# Patient Record
Sex: MALE | Race: WHITE | NOT HISPANIC OR LATINO | Employment: OTHER | URBAN - METROPOLITAN AREA
[De-identification: names, ages, dates, MRNs, and addresses within clinical notes are randomized per-mention and may not be internally consistent; named-entity substitution may affect disease eponyms.]

---

## 2017-02-24 ENCOUNTER — OFFICE VISIT - RIVER FALLS (OUTPATIENT)
Dept: FAMILY MEDICINE | Facility: CLINIC | Age: 57
End: 2017-02-24

## 2017-02-24 ASSESSMENT — MIFFLIN-ST. JEOR: SCORE: 1620.8

## 2017-03-02 ENCOUNTER — OFFICE VISIT - RIVER FALLS (OUTPATIENT)
Dept: FAMILY MEDICINE | Facility: CLINIC | Age: 57
End: 2017-03-02

## 2017-03-23 ENCOUNTER — OFFICE VISIT - RIVER FALLS (OUTPATIENT)
Dept: FAMILY MEDICINE | Facility: CLINIC | Age: 57
End: 2017-03-23

## 2017-04-11 ENCOUNTER — OFFICE VISIT - RIVER FALLS (OUTPATIENT)
Dept: FAMILY MEDICINE | Facility: CLINIC | Age: 57
End: 2017-04-11

## 2017-04-11 ASSESSMENT — MIFFLIN-ST. JEOR: SCORE: 1589.16

## 2017-07-25 ENCOUNTER — AMBULATORY - RIVER FALLS (OUTPATIENT)
Dept: FAMILY MEDICINE | Facility: CLINIC | Age: 57
End: 2017-07-25

## 2017-07-26 LAB
CHOLEST SERPL-MCNC: 210 MG/DL (ref 125–200)
CHOLEST/HDLC SERPL: 5.5 {RATIO}
HDLC SERPL-MCNC: 38 MG/DL
LDLC SERPL CALC-MCNC: 78 MG/DL
LDLC SERPL CALC-MCNC: ABNORMAL MG/DL
NONHDLC SERPL-MCNC: 172 MG/DL
TRIGL SERPL-MCNC: 653 MG/DL

## 2017-08-17 ENCOUNTER — OFFICE VISIT - RIVER FALLS (OUTPATIENT)
Dept: FAMILY MEDICINE | Facility: CLINIC | Age: 57
End: 2017-08-17

## 2017-08-17 ASSESSMENT — MIFFLIN-ST. JEOR: SCORE: 1600.05

## 2018-05-10 ENCOUNTER — AMBULATORY - RIVER FALLS (OUTPATIENT)
Dept: FAMILY MEDICINE | Facility: CLINIC | Age: 58
End: 2018-05-10

## 2018-05-11 LAB
CHOLEST SERPL-MCNC: 182 MG/DL
CHOLEST/HDLC SERPL: 4.7 {RATIO}
CREAT SERPL-MCNC: 1.03 MG/DL (ref 0.7–1.33)
GLUCOSE BLD-MCNC: 98 MG/DL (ref 65–99)
HDLC SERPL-MCNC: 39 MG/DL
LDLC SERPL CALC-MCNC: 94 MG/DL
NONHDLC SERPL-MCNC: 143 MG/DL
TRIGL SERPL-MCNC: 366 MG/DL

## 2018-05-23 ENCOUNTER — OFFICE VISIT - RIVER FALLS (OUTPATIENT)
Dept: FAMILY MEDICINE | Facility: CLINIC | Age: 58
End: 2018-05-23

## 2018-05-23 ASSESSMENT — MIFFLIN-ST. JEOR: SCORE: 1606.4

## 2018-10-24 ENCOUNTER — OFFICE VISIT - RIVER FALLS (OUTPATIENT)
Dept: FAMILY MEDICINE | Facility: CLINIC | Age: 58
End: 2018-10-24

## 2018-10-24 ASSESSMENT — MIFFLIN-ST. JEOR: SCORE: 1615.47

## 2019-01-14 ENCOUNTER — OFFICE VISIT - RIVER FALLS (OUTPATIENT)
Dept: FAMILY MEDICINE | Facility: CLINIC | Age: 59
End: 2019-01-14

## 2019-01-14 ASSESSMENT — MIFFLIN-ST. JEOR: SCORE: 1604.58

## 2019-01-15 LAB
BUN SERPL-MCNC: 12 MG/DL (ref 7–25)
BUN/CREAT RATIO - HISTORICAL: NORMAL (ref 6–22)
CALCIUM SERPL-MCNC: 10.3 MG/DL (ref 8.6–10.3)
CHLORIDE BLD-SCNC: 103 MMOL/L (ref 98–110)
CHOLEST SERPL-MCNC: 202 MG/DL
CHOLEST/HDLC SERPL: 5.6 {RATIO}
CO2 SERPL-SCNC: 30 MMOL/L (ref 20–32)
CREAT SERPL-MCNC: 0.97 MG/DL (ref 0.7–1.33)
EGFRCR SERPLBLD CKD-EPI 2021: 85 ML/MIN/1.73M2
GLUCOSE BLD-MCNC: 99 MG/DL (ref 65–99)
HDLC SERPL-MCNC: 36 MG/DL
LDLC SERPL CALC-MCNC: 87 MG/DL
LDLC SERPL CALC-MCNC: ABNORMAL MG/DL
NONHDLC SERPL-MCNC: 166 MG/DL
POTASSIUM BLD-SCNC: 5 MMOL/L (ref 3.5–5.3)
SODIUM SERPL-SCNC: 139 MMOL/L (ref 135–146)
TRIGL SERPL-MCNC: 443 MG/DL

## 2019-03-29 ENCOUNTER — OFFICE VISIT - RIVER FALLS (OUTPATIENT)
Dept: FAMILY MEDICINE | Facility: CLINIC | Age: 59
End: 2019-03-29

## 2019-03-29 ASSESSMENT — MIFFLIN-ST. JEOR: SCORE: 1602.77

## 2019-03-30 LAB
BUN SERPL-MCNC: 12 MG/DL (ref 7–25)
BUN/CREAT RATIO - HISTORICAL: NORMAL (ref 6–22)
CALCIUM SERPL-MCNC: 9.8 MG/DL (ref 8.6–10.3)
CHLORIDE BLD-SCNC: 101 MMOL/L (ref 98–110)
CO2 SERPL-SCNC: 29 MMOL/L (ref 20–32)
CREAT SERPL-MCNC: 0.96 MG/DL (ref 0.7–1.33)
EGFRCR SERPLBLD CKD-EPI 2021: 86 ML/MIN/1.73M2
ERYTHROCYTE [DISTWIDTH] IN BLOOD BY AUTOMATED COUNT: 14 % (ref 11–15)
ERYTHROCYTE [SEDIMENTATION RATE] IN BLOOD BY WESTERGREN METHOD: 6 MM/H
GLUCOSE BLD-MCNC: 90 MG/DL (ref 65–99)
HCT VFR BLD AUTO: 44.7 % (ref 38.5–50)
HGB BLD-MCNC: 15.8 GM/DL (ref 13.2–17.1)
MCH RBC QN AUTO: 29.4 PG (ref 27–33)
MCHC RBC AUTO-ENTMCNC: 35.3 GM/DL (ref 32–36)
MCV RBC AUTO: 83.1 FL (ref 80–100)
PLATELET # BLD AUTO: 265 10*3/UL (ref 140–400)
PMV BLD: 11.5 FL (ref 7.5–12.5)
POTASSIUM BLD-SCNC: 4.3 MMOL/L (ref 3.5–5.3)
RBC # BLD AUTO: 5.38 10*6/UL (ref 4.2–5.8)
SODIUM SERPL-SCNC: 138 MMOL/L (ref 135–146)
TSH SERPL DL<=0.005 MIU/L-ACNC: 1.91 MIU/L (ref 0.4–4.5)
WBC # BLD AUTO: 8.5 10*3/UL (ref 3.8–10.8)

## 2019-08-07 ENCOUNTER — COMMUNICATION - RIVER FALLS (OUTPATIENT)
Dept: FAMILY MEDICINE | Facility: CLINIC | Age: 59
End: 2019-08-07
Payer: COMMERCIAL

## 2019-09-25 ENCOUNTER — OFFICE VISIT - RIVER FALLS (OUTPATIENT)
Dept: FAMILY MEDICINE | Facility: CLINIC | Age: 59
End: 2019-09-25
Payer: COMMERCIAL

## 2019-09-25 LAB
FLUAV AG SPEC QL IA: NEGATIVE
FLUBV AG SPEC QL IA: NEGATIVE

## 2019-09-25 ASSESSMENT — MIFFLIN-ST. JEOR: SCORE: 1606.4

## 2019-09-29 ENCOUNTER — OFFICE VISIT - RIVER FALLS (OUTPATIENT)
Dept: FAMILY MEDICINE | Facility: CLINIC | Age: 59
End: 2019-09-29
Payer: COMMERCIAL

## 2019-09-29 ENCOUNTER — COMMUNICATION - RIVER FALLS (OUTPATIENT)
Dept: FAMILY MEDICINE | Facility: CLINIC | Age: 59
End: 2019-09-29
Payer: COMMERCIAL

## 2019-09-29 LAB
BASOPHILS # BLD MANUAL: 0 10*3/UL
BASOPHILS NFR BLD MANUAL: 0.4 %
D DIMER PPP FEU-MCNC: 0.54
EOSINOPHIL # BLD MANUAL: 0.3 10*3/UL
EOSINOPHIL NFR BLD MANUAL: 2.6 %
ERYTHROCYTE [DISTWIDTH] IN BLOOD BY AUTOMATED COUNT: 13.6 % (ref 11.5–15.5)
HCT VFR BLD AUTO: 42.5 % (ref 37–53)
HGB BLD-MCNC: 14.9 G/DL (ref 13.5–17.5)
LYMPHOCYTES # BLD MANUAL: 1.3 10*3/UL (ref 0.9–2.9)
LYMPHOCYTES NFR BLD MANUAL: 13.2 %
MCH RBC QN AUTO: 29.4 PG (ref 26–34)
MCHC RBC AUTO-ENTMCNC: 35.1 G/DL (ref 32–36)
MCV RBC AUTO: 84 FL (ref 80–100)
MONOCYTES # BLD MANUAL: 1.2 10*3/UL
MONOCYTES NFR BLD MANUAL: 12.2 %
NEUTROPHILS # BLD MANUAL: 7.3 10*3/UL (ref 1.7–7)
NEUTROPHILS NFR BLD MANUAL: 71.6 %
PLATELET # BLD AUTO: 276 10*3/UL (ref 140–440)
PMV BLD: 10.6 FL (ref 6.5–11)
RBC # BLD AUTO: 5.06 10*6/UL (ref 4.3–5.9)
WBC # BLD AUTO: 10.2 10*3/UL (ref 4.5–11)

## 2019-09-29 ASSESSMENT — MIFFLIN-ST. JEOR: SCORE: 1582.81

## 2019-10-15 ENCOUNTER — OFFICE VISIT - RIVER FALLS (OUTPATIENT)
Dept: FAMILY MEDICINE | Facility: CLINIC | Age: 59
End: 2019-10-15
Payer: COMMERCIAL

## 2019-10-15 ASSESSMENT — MIFFLIN-ST. JEOR: SCORE: 1588.25

## 2019-10-29 ENCOUNTER — OFFICE VISIT - RIVER FALLS (OUTPATIENT)
Dept: FAMILY MEDICINE | Facility: CLINIC | Age: 59
End: 2019-10-29
Payer: COMMERCIAL

## 2019-10-29 ASSESSMENT — MIFFLIN-ST. JEOR: SCORE: 1595.51

## 2019-11-26 ENCOUNTER — OFFICE VISIT - RIVER FALLS (OUTPATIENT)
Dept: FAMILY MEDICINE | Facility: CLINIC | Age: 59
End: 2019-11-26
Payer: COMMERCIAL

## 2019-11-26 ASSESSMENT — MIFFLIN-ST. JEOR: SCORE: 1615.47

## 2020-01-05 ENCOUNTER — OFFICE VISIT - RIVER FALLS (OUTPATIENT)
Dept: FAMILY MEDICINE | Facility: CLINIC | Age: 60
End: 2020-01-05
Payer: COMMERCIAL

## 2020-01-05 LAB
ANION GAP SERPL CALCULATED.3IONS-SCNC: 10 MMOL/L (ref 5–18)
BUN SERPL-MCNC: 15 MG/DL (ref 8–25)
BUN/CREAT RATIO - HISTORICAL: 13 (ref 10–20)
CALCIUM SERPL-MCNC: 9.9 MG/DL (ref 8.5–10.5)
CHLORIDE BLD-SCNC: 105 MMOL/L (ref 98–110)
CO2 SERPL-SCNC: 24 MMOL/L (ref 21–31)
CREAT SERPL-MCNC: 1.13 MG/DL (ref 0.72–1.25)
GFR ESTIMATE EXT - HISTORICAL: >60
GLUCOSE BLD-MCNC: 99 MG/DL (ref 65–100)
POTASSIUM BLD-SCNC: 4.5 MMOL/L (ref 3.5–5)
SODIUM SERPL-SCNC: 139 MMOL/L (ref 135–145)

## 2020-01-05 ASSESSMENT — MIFFLIN-ST. JEOR: SCORE: 1627.26

## 2020-01-07 ENCOUNTER — COMMUNICATION - RIVER FALLS (OUTPATIENT)
Dept: FAMILY MEDICINE | Facility: CLINIC | Age: 60
End: 2020-01-07
Payer: COMMERCIAL

## 2020-01-07 LAB — URATE SERPL-MCNC: 8 MG/DL (ref 4–8)

## 2020-03-13 ENCOUNTER — COMMUNICATION - RIVER FALLS (OUTPATIENT)
Dept: FAMILY MEDICINE | Facility: CLINIC | Age: 60
End: 2020-03-13
Payer: COMMERCIAL

## 2020-06-02 ENCOUNTER — AMBULATORY - RIVER FALLS (OUTPATIENT)
Dept: FAMILY MEDICINE | Facility: CLINIC | Age: 60
End: 2020-06-02
Payer: COMMERCIAL

## 2020-06-02 ASSESSMENT — MIFFLIN-ST. JEOR: SCORE: 1611.84

## 2020-06-03 ENCOUNTER — COMMUNICATION - RIVER FALLS (OUTPATIENT)
Dept: FAMILY MEDICINE | Facility: CLINIC | Age: 60
End: 2020-06-03
Payer: COMMERCIAL

## 2020-06-03 LAB
BUN SERPL-MCNC: 22 MG/DL (ref 7–25)
BUN/CREAT RATIO - HISTORICAL: ABNORMAL (ref 6–22)
CALCIUM SERPL-MCNC: 10.3 MG/DL (ref 8.6–10.3)
CHLORIDE BLD-SCNC: 104 MMOL/L (ref 98–110)
CHOLEST SERPL-MCNC: 315 MG/DL
CHOLEST/HDLC SERPL: 8.1 {RATIO}
CO2 SERPL-SCNC: 23 MMOL/L (ref 20–32)
CREAT SERPL-MCNC: 1.16 MG/DL (ref 0.7–1.25)
EGFRCR SERPLBLD CKD-EPI 2021: 68 ML/MIN/1.73M2
ERYTHROCYTE [DISTWIDTH] IN BLOOD BY AUTOMATED COUNT: 13.5 % (ref 11–15)
GLUCOSE BLD-MCNC: 104 MG/DL (ref 65–99)
HCT VFR BLD AUTO: 47.2 % (ref 38.5–50)
HDLC SERPL-MCNC: 39 MG/DL
HGB BLD-MCNC: 16 GM/DL (ref 13.2–17.1)
LDLC SERPL CALC-MCNC: ABNORMAL MG/DL
MCH RBC QN AUTO: 28.9 PG (ref 27–33)
MCHC RBC AUTO-ENTMCNC: 33.9 GM/DL (ref 32–36)
MCV RBC AUTO: 85.2 FL (ref 80–100)
NONHDLC SERPL-MCNC: 276 MG/DL
PLATELET # BLD AUTO: 280 10*3/UL (ref 140–400)
PMV BLD: 11.1 FL (ref 7.5–12.5)
POTASSIUM BLD-SCNC: 4.6 MMOL/L (ref 3.5–5.3)
RBC # BLD AUTO: 5.54 10*6/UL (ref 4.2–5.8)
SODIUM SERPL-SCNC: 139 MMOL/L (ref 135–146)
TRIGL SERPL-MCNC: 626 MG/DL
WBC # BLD AUTO: 7.1 10*3/UL (ref 3.8–10.8)

## 2020-06-09 ENCOUNTER — OFFICE VISIT - RIVER FALLS (OUTPATIENT)
Dept: FAMILY MEDICINE | Facility: CLINIC | Age: 60
End: 2020-06-09
Payer: COMMERCIAL

## 2020-08-18 ENCOUNTER — AMBULATORY - RIVER FALLS (OUTPATIENT)
Dept: FAMILY MEDICINE | Facility: CLINIC | Age: 60
End: 2020-08-18
Payer: COMMERCIAL

## 2020-08-19 ENCOUNTER — COMMUNICATION - RIVER FALLS (OUTPATIENT)
Dept: FAMILY MEDICINE | Facility: CLINIC | Age: 60
End: 2020-08-19
Payer: COMMERCIAL

## 2020-08-19 LAB
A/G RATIO - HISTORICAL: 2.2 (ref 1–2.5)
ALBUMIN SERPL-MCNC: 4.8 GM/DL (ref 3.6–5.1)
ALP SERPL-CCNC: 67 UNIT/L (ref 35–144)
ALT SERPL W P-5'-P-CCNC: 40 UNIT/L (ref 9–46)
AST SERPL W P-5'-P-CCNC: 28 UNIT/L (ref 10–35)
BILIRUB SERPL-MCNC: 0.7 MG/DL (ref 0.2–1.2)
BUN SERPL-MCNC: 19 MG/DL (ref 7–25)
BUN/CREAT RATIO - HISTORICAL: NORMAL (ref 6–22)
CALCIUM SERPL-MCNC: 10 MG/DL (ref 8.6–10.3)
CHLORIDE BLD-SCNC: 102 MMOL/L (ref 98–110)
CHOLEST SERPL-MCNC: 220 MG/DL
CHOLEST/HDLC SERPL: 5.5 {RATIO}
CO2 SERPL-SCNC: 28 MMOL/L (ref 20–32)
CREAT SERPL-MCNC: 1.1 MG/DL (ref 0.7–1.25)
EGFRCR SERPLBLD CKD-EPI 2021: 72 ML/MIN/1.73M2
GLOBULIN: 2.2 (ref 1.9–3.7)
GLUCOSE BLD-MCNC: 95 MG/DL (ref 65–99)
HDLC SERPL-MCNC: 40 MG/DL
LDLC SERPL CALC-MCNC: ABNORMAL MG/DL
NONHDLC SERPL-MCNC: 180 MG/DL
POTASSIUM BLD-SCNC: 4.7 MMOL/L (ref 3.5–5.3)
PROT SERPL-MCNC: 7 GM/DL (ref 6.1–8.1)
SODIUM SERPL-SCNC: 137 MMOL/L (ref 135–146)
TRIGL SERPL-MCNC: 433 MG/DL

## 2020-12-21 ENCOUNTER — OFFICE VISIT - RIVER FALLS (OUTPATIENT)
Dept: FAMILY MEDICINE | Facility: CLINIC | Age: 60
End: 2020-12-21
Payer: COMMERCIAL

## 2021-01-31 ENCOUNTER — OFFICE VISIT - RIVER FALLS (OUTPATIENT)
Dept: FAMILY MEDICINE | Facility: CLINIC | Age: 61
End: 2021-01-31
Payer: COMMERCIAL

## 2021-02-03 ENCOUNTER — COMMUNICATION - RIVER FALLS (OUTPATIENT)
Dept: FAMILY MEDICINE | Facility: CLINIC | Age: 61
End: 2021-02-03
Payer: COMMERCIAL

## 2021-02-03 LAB — SARS-COV-2 RNA RESP QL NAA+PROBE: NEGATIVE

## 2021-02-09 ENCOUNTER — OFFICE VISIT - RIVER FALLS (OUTPATIENT)
Dept: FAMILY MEDICINE | Facility: CLINIC | Age: 61
End: 2021-02-09
Payer: COMMERCIAL

## 2021-02-09 ENCOUNTER — OFFICE VISIT (OUTPATIENT)
Dept: PHARMACY | Facility: PHYSICIAN GROUP | Age: 61
End: 2021-02-09
Payer: COMMERCIAL

## 2021-02-09 DIAGNOSIS — J45.909 REACTIVE AIRWAY DISEASE: Primary | ICD-10-CM

## 2021-02-09 DIAGNOSIS — J45.909 REACTIVE AIRWAY DISEASE WITHOUT COMPLICATION, UNSPECIFIED ASTHMA SEVERITY, UNSPECIFIED WHETHER PERSISTENT: ICD-10-CM

## 2021-02-09 PROCEDURE — 99207 PR NO CHARGE LOS: CPT | Performed by: PHARMACIST

## 2021-02-09 RX ORDER — ALBUTEROL SULFATE 90 UG/1
2 AEROSOL, METERED RESPIRATORY (INHALATION) EVERY 4 HOURS PRN
COMMUNITY
End: 2023-01-25

## 2021-02-09 RX ORDER — OMEPRAZOLE 40 MG/1
40 CAPSULE, DELAYED RELEASE ORAL DAILY
COMMUNITY
End: 2022-03-31

## 2021-02-09 RX ORDER — INDOMETHACIN 50 MG/1
50 CAPSULE ORAL
COMMUNITY
End: 2023-01-25

## 2021-02-09 RX ORDER — ROSUVASTATIN CALCIUM 20 MG/1
20 TABLET, COATED ORAL DAILY
COMMUNITY
End: 2022-03-31

## 2021-02-09 RX ORDER — FLUTICASONE PROPIONATE AND SALMETEROL 113; 14 UG/1; UG/1
1 POWDER, METERED RESPIRATORY (INHALATION) 2 TIMES DAILY
COMMUNITY
End: 2022-10-25

## 2021-02-09 NOTE — PATIENT INSTRUCTIONS
Recommendations from today's MTM visit:                                                    MTM (medication therapy management) is a service provided by a clinical pharmacist designed to help you get the most of out of your medicines.      Option 1:   Airduo respiclick (113/14): 1 puff by mouth twice daily; rinse mouth after use - Can use coupon    Option 2:  Advair MDI (115/21): 2 puffs by mouth twice daily; rinse mouth after use - generic option available that may be cheaper.    If both are still too expensive, recommend calling your insurance to see which inhalers are tier 1 (least expensive).    It was great to speak with you today.  I value your experience and would be very thankful for your time with providing feedback on our clinic survey. You may receive a survey via email or text message in the next few days.     Next MTM visit: as needed - call to schedule.  [employer] OOS coverage unknown.    To schedule another MTM appointment, please call the clinic directly or you may call the MTM scheduling line at [phone] or toll-free at [phone].     My Clinical Pharmacist's contact information:                                                      It was a pleasure talking with you today!  Please feel free to contact me with any questions or concerns you have.      Rocio Rodriguez, Abdirizak  Medication Therapy Management (MTM) Pharmacist  [facility] (Tu/Th/Fr)  Clinic Phone: [phone]  Pager: [phone]

## 2021-02-09 NOTE — PROGRESS NOTES
Clinical Pharmacy Consult:                                                    Andrew Monique Jr. is a 60 year old male coming in for a clinical pharmacist consult.  He was referred to me from TAMANNA Guillory.     Reason for Consult: inhaler cost - per Patient  Per provider: assist Andrew in finding an assistance plan or different combination inhaled corticosteroid/LABA as Dulera (prescribed by Dr. Yu) which has worked well for him is costing $300+ per inhaler and so he had stopped using.     Discussion:   Reactive Airway disease:  Per Patient symptoms started about 5 years ago, then about 1 year ago started on Dulera inhaler - which helped.  States that symptoms are better controlled when taking the Dulera, but he had stopped it over the summer months due to cost. Then recently had a flare of symptoms - and that is when he scheduled with TAMANNA Guillory to be seen.    Symptoms: Intermittent coughing spells that then result in a pounding on the top of his head; thinks that at one point long ago he was coughing so bad he felt like he might have passed out, shortness of breath.  If he is climbing stairs or doing other activities will get out of breath, or breathing harder.    Current meds:     Dulera: $360/fill Dulera 100-5 mc puff twice daily, rinsing mouth after use; started about 1 year ago felt that this was working well while taking it consistently.    Albuterol is not as expensive - has had this for 4-5 years; needs multiple times/day in the winter months and using this intermittently through the summer months.   Finds relief for minutes to hours, sometimes has jitters from using  Current use: three times daily; uses more than three times daily when not on dulera.    Recommendation:  Increase steroid component of inhaler since still needing albuterol three times daily with current dulera (which is low steroid dose).  Will switch to product with generic option or coupon option.    Education  provided about tiered insurance coverage and if the medication options sent today are still too expensive, would encourage him to call insurance directly to determine the cheapest option then notify us at clinic.    Plan:  1. Will switch to alternate ICS/LABA product. Reviewed several options with Patient in clinic today and sent Rx's to the pharmacy.  Option 1:   Airduo respiclick (113/14): 1 puff by mouth twice daily; rinse mouth after use - Can use coupon  Option 2:  Advair MDI (115/21): 2 puffs by mouth twice daily; rinse mouth after use.      Recommend follow-up in 1 month with PCP to assess efficacy and consider higher steroid component.    Changes made per CPA with Aydin Yu.    PharmD discussed with patient that patient may be financially responsible for future MTM visits - would need to confirm coverage with [employer] O[employer].  Patient has communicated understanding.    Rocio Rodriguez, Abdirizak  Medication Therapy Management (MTM) Pharmacist  [facility] (Tu/Th/Fr)  Clinic Phone: [phone]  Pager: [phone]    Addended on February 9, 2021.  Called to pharmacy to confirm coverage. Airduo Respiclick is Airduo respiclick (generic): $87.01 for 1 month supply - cheaper than previous dulera.  Brand airduo respiclick is on backorder.  Sent 3 month supply for Airduo respiclick: 261.03 - no price difference.  Verbally discontinued other inhalers

## 2021-03-15 ENCOUNTER — OFFICE VISIT - RIVER FALLS (OUTPATIENT)
Dept: FAMILY MEDICINE | Facility: CLINIC | Age: 61
End: 2021-03-15
Payer: COMMERCIAL

## 2021-03-27 ENCOUNTER — NURSE TRIAGE (OUTPATIENT)
Dept: NURSING | Facility: CLINIC | Age: 61
End: 2021-03-27

## 2021-03-27 ENCOUNTER — OFFICE VISIT - RIVER FALLS (OUTPATIENT)
Dept: FAMILY MEDICINE | Facility: CLINIC | Age: 61
End: 2021-03-27
Payer: COMMERCIAL

## 2021-03-27 NOTE — TELEPHONE ENCOUNTER
Patient reports he is on prednisone for hives. He was put on prednisone taper and has taken his 3rd dose but says last night hives were worse (lips and face were swollen), today he has hives on wrist and arms. Patient also noticed voice is a little more hoarse today. Patient wants to know if he should be seen or continue with the taper. Reviewed care advice with caller per RN triage protocol guideline. Advised to be seen in urgent care today. Caller verbalized understanding and agrees with plan.        Additional Information    Negative: [1] Life-threatening reaction (anaphylaxis) in the past to similar substance (e.g., food, insect bite/sting, chemical, etc.) AND [2] < 2 hours since exposure    Negative: Difficulty breathing or wheezing now    Negative: [1] Swollen tongue AND [2] rapid onset    Negative: [1] Hoarseness or cough now AND [2] rapid onset    Negative: Shock suspected (e.g., cold/pale/clammy skin, too weak to stand, low BP, rapid pulse)    Negative: Difficult to awaken or acting confused (e.g., disoriented, slurred speech)    Negative: Sounds like a life-threatening emergency to the triager    Negative: Swollen tongue    Negative: [1] Widespread hives AND [2] onset < 2 hours of exposure to high-risk allergen (e.g., peanuts, tree nuts, fish or shellfish)    Negative: Patient sounds very sick or weak to the triager    Negative: [1] MODERATE-SEVERE hives persist (i.e., hives interfere with normal activities or work) AND [2] taking antihistamine (e.g., Benadryl, Claritin) > 24 hours    [1] Hives have become worse AND [2] taking oral steroids (e.g., prednisone) > 24 hours    Protocols used: HIVES-A-

## 2021-03-30 ENCOUNTER — OFFICE VISIT - RIVER FALLS (OUTPATIENT)
Dept: FAMILY MEDICINE | Facility: CLINIC | Age: 61
End: 2021-03-30
Payer: COMMERCIAL

## 2021-04-07 ENCOUNTER — AMBULATORY - HEALTHEAST (OUTPATIENT)
Dept: ALLERGY | Facility: CLINIC | Age: 61
End: 2021-04-07

## 2021-04-07 DIAGNOSIS — T78.40XA ALLERGIC REACTION: ICD-10-CM

## 2021-04-07 DIAGNOSIS — Z88.8 ALLERGY TO LISINOPRIL: ICD-10-CM

## 2021-05-11 ENCOUNTER — OFFICE VISIT - RIVER FALLS (OUTPATIENT)
Dept: FAMILY MEDICINE | Facility: CLINIC | Age: 61
End: 2021-05-11
Payer: COMMERCIAL

## 2021-05-17 ENCOUNTER — OFFICE VISIT - HEALTHEAST (OUTPATIENT)
Dept: ALLERGY | Facility: CLINIC | Age: 61
End: 2021-05-17

## 2021-05-17 DIAGNOSIS — L50.1 CHRONIC IDIOPATHIC URTICARIA: ICD-10-CM

## 2021-05-17 LAB
ALT SERPL W P-5'-P-CCNC: 41 U/L (ref 0–45)
AST SERPL W P-5'-P-CCNC: 28 U/L (ref 0–40)
BASOPHILS # BLD AUTO: 0.1 THOU/UL (ref 0–0.2)
BASOPHILS NFR BLD AUTO: 1 % (ref 0–2)
CREAT SERPL-MCNC: 0.95 MG/DL (ref 0.7–1.3)
EOSINOPHIL # BLD AUTO: 0 THOU/UL (ref 0–0.4)
EOSINOPHIL NFR BLD AUTO: 0 % (ref 0–6)
ERYTHROCYTE [DISTWIDTH] IN BLOOD BY AUTOMATED COUNT: 12.9 % (ref 11–14.5)
GFR SERPL CREATININE-BSD FRML MDRD: >60 ML/MIN/1.73M2
HCT VFR BLD AUTO: 41.7 % (ref 40–54)
HGB BLD-MCNC: 14.3 G/DL (ref 14–18)
IMM GRANULOCYTES # BLD: 0 THOU/UL
IMM GRANULOCYTES NFR BLD: 0 %
LYMPHOCYTES # BLD AUTO: 2 THOU/UL (ref 0.8–4.4)
LYMPHOCYTES NFR BLD AUTO: 25 % (ref 20–40)
MCH RBC QN AUTO: 29.2 PG (ref 27–34)
MCHC RBC AUTO-ENTMCNC: 34.3 G/DL (ref 32–36)
MCV RBC AUTO: 85 FL (ref 80–100)
MONOCYTES # BLD AUTO: 0.8 THOU/UL (ref 0–0.9)
MONOCYTES NFR BLD AUTO: 11 % (ref 2–10)
NEUTROPHILS # BLD AUTO: 5 THOU/UL (ref 2–7.7)
NEUTROPHILS NFR BLD AUTO: 63 % (ref 50–70)
PLATELET # BLD AUTO: 237 THOU/UL (ref 140–440)
PMV BLD AUTO: 10.6 FL (ref 7–10)
RBC # BLD AUTO: 4.9 MILL/UL (ref 4.4–6.2)
TSH SERPL DL<=0.005 MIU/L-ACNC: 1.29 UIU/ML (ref 0.3–5)
WBC: 8 THOU/UL (ref 4–11)

## 2021-05-17 ASSESSMENT — MIFFLIN-ST. JEOR: SCORE: 1666.29

## 2021-05-18 LAB — 25(OH)D3 SERPL-MCNC: 13.5 NG/ML (ref 30–80)

## 2021-05-21 ENCOUNTER — COMMUNICATION - HEALTHEAST (OUTPATIENT)
Dept: ALLERGY | Facility: CLINIC | Age: 61
End: 2021-05-21

## 2021-05-21 ENCOUNTER — AMBULATORY - HEALTHEAST (OUTPATIENT)
Dept: ALLERGY | Facility: CLINIC | Age: 61
End: 2021-05-21

## 2021-05-21 DIAGNOSIS — E56.9 VITAMIN DEFICIENCY: ICD-10-CM

## 2021-05-21 LAB — TRYPTASE SERPL-MCNC: 6.4 UG/L

## 2021-05-27 VITALS — OXYGEN SATURATION: 96 % | HEART RATE: 92 BPM | WEIGHT: 199 LBS | BODY MASS INDEX: 31.23 KG/M2 | HEIGHT: 67 IN

## 2021-06-17 NOTE — PROGRESS NOTES
"        Subjective   Andrew Monique Jr. is 60 y.o. and presents today for the following health issues   HPI chief complaint: Allergies    History of present illness: This is a pleasant 60-year-old gentleman I was asked to see for evaluation by Dr. Yu in regards to allergies.  Patient states about 6 months ago he developed a severe 5 outbreak.  He describes them as red itchy raised welts.  He states he was given prednisone.  After stopping the prednisone, the hives rebounded.  He states that he had hives for some time until his face swelled.  His lip swelled.  He was taking lisinopril which has been on for years.  This was stopped at that time.  He states he was then put on another course of prednisone and the hives have dissipated since that time.  He has not restarted the lisinopril and is now currently on losartan.  He states off and on through the years he has had hives.  He states he has them a few months per year.  He states they are very minimal and random and do not seem to bother him.  He is taking a daily Zyrtec now but he previously was on Benadryl.  This does seem to improve symptoms.  He does not take any over-the-counter supplements except for a baby aspirin which has been on for quite some time.  No other changes in his medication.  No travel outside the [place].  No bruising to the hives.  No joint pain or abdominal pain.  He has not noted any change with temperature water or vibration.  No change with exercise.  Previous history of sweats with Vicodin, no other drug allergies.      Past medical history: Reflux, hyperlipidemia, hypertension    Social history: He is retired, has a dog at home, no changes at home, lives in a home without central air, non-smoker, secondhand smoke exposure    Family history: No family history of hives        Review of Systems  Review of Systems performed as above and the remainder is negative.      Objective    Pulse 92   Ht 5' 7\" (1.702 m)   Wt 199 lb (90.3 kg)   " SpO2 96%   BMI 31.17 kg/m    Body mass index is 31.17 kg/m .  Physical Exam  Gen: Pleasant male not in acute distress  HEENT: Eyes no erythema of the bulbar or palpebral conjunctiva, no edema. Ears: No deformities or lesions nose: No congestion,  Mouth: Throat clear, no lip or tongue edema.   Neck: No masses lesions or swelling  Respiratory: no coughing with breathing, no retractions  Lymph: No visible supraclavicular or cervical lymphadenopathy  Skin: No rashes or lesions  Psych: Alert and oriented times 3        Impression report and plan:    1.  Chronic urticaria    I do not think this is an allergy to lisinopril rather chronic urticaria.  Recommended Zyrtec to be used up to 2 tablets twice daily.  I would like to do a systemic work-up including TSH, CBC with differential, AST, ALT, creatinine and a vitamin D level as well as a tryptase.  Notify if worsening symptoms.  85% of patients with chronic urticaria do resolve in 2 years.  Went over specific triggers for this.  Otherwise, follow-up as needed.

## 2021-06-17 NOTE — TELEPHONE ENCOUNTER
Called and discussed, he will get the RX Vitamin D tonight and start it and the OTC right away. His PCP is [facility] and he will get his level rechecked in 2 months    ----- Message from Lexii Bravo MD sent at 5/21/2021  3:52 PM CDT -----  Vitamin D level is 13.  Should be 30.  I called 50,000 units of vitamin D weekly for 8 weeks.  Recheck at that time. Start at least 1000 units of vitamin D3 daily.  Low vitamin D can cause hives.  All other labs were normal.

## 2021-06-17 NOTE — PATIENT INSTRUCTIONS - HE
Check labs today    Zyrtec (Cetirizine) 10 mg twice daily --- up to two tabs twice daily     Reassess often    Chronic autoimmune urticaria and angioedema    85% of patients resolve within 2 years

## 2021-06-21 NOTE — LETTER
Letter by Lexii Bravo MD at      Author: Lexii Bravo MD Service: -- Author Type: --    Filed:  Encounter Date: 5/17/2021 Status: (Other)         May 17, 2021     Aydin Yu MD  [address] [facility] [address]    Patient: Andrew Monique Jr.   MR Number: [id number]   YOB: 1960   Date of Visit: 5/17/2021     Dear Dr. Gigi MD:    Thank you for referring Andrew Monique to me for evaluation. He has chronic hives.  I have included my note for your review.     If you have questions, please do not hesitate to call me.  Sincerely,        Lexii Bravo MD        CC  No Recipients    Progress Notes:        Subjective   Andrew Monique Jr. is 60 y.o. and presents today for the following health issues   HPI chief complaint: Allergies    History of present illness: This is a pleasant 60-year-old gentleman I was asked to see for evaluation by Dr. Yu in regards to allergies.  Patient states about 6 months ago he developed a severe 5 outbreak.  He describes them as red itchy raised welts.  He states he was given prednisone.  After stopping the prednisone, the hives rebounded.  He states that he had hives for some time until his face swelled.  His lip swelled.  He was taking lisinopril which has been on for years.  This was stopped at that time.  He states he was then put on another course of prednisone and the hives have dissipated since that time.  He has not restarted the lisinopril and is now currently on losartan.  He states off and on through the years he has had hives.  He states he has them a few months per year.  He states they are very minimal and random and do not seem to bother him.  He is taking a daily Zyrtec now but he previously was on Benadryl.  This does seem to improve symptoms.  He does not take any over-the-counter supplements except for a baby aspirin which has been on for quite some time.  No other changes in his medication.  No travel outside  [place]the [place]  No bruising to the hives.  No joint pain or abdominal pain.  He has not noted any change with temperature water or vibration.  No change with exercise.  Previous history of sweats with Vicodin, no other drug allergies.      Past medical history: Reflux, hyperlipidemia, hypertension    Social history: He is retired, has a dog at home, no changes at home, lives in a home without central air, non-smoker, secondhand smoke exposure    Family history: No family history of hives        Review of Systems  Review of Systems performed as above and the remainder is negative.      Objective    Pulse 92   Ht 5' 7\" (1.702 m)   Wt 199 lb (90.3 kg)   SpO2 96%   BMI 31.17 kg/m    Body mass index is 31.17 kg/m .  Physical Exam  Gen: Pleasant male not in acute distress  HEENT: Eyes no erythema of the bulbar or palpebral conjunctiva, no edema. Ears: No deformities or lesions nose: No congestion,  Mouth: Throat clear, no lip or tongue edema.   Neck: No masses lesions or swelling  Respiratory: no coughing with breathing, no retractions  Lymph: No visible supraclavicular or cervical lymphadenopathy  Skin: No rashes or lesions  Psych: Alert and oriented times 3        Impression report and plan:    1.  Chronic urticaria    I do not think this is an allergy to lisinopril rather chronic urticaria.  Recommended Zyrtec to be used up to 2 tablets twice daily.  I would like to do a systemic work-up including TSH, CBC with differential, AST, ALT, creatinine and a vitamin D level as well as a tryptase.  Notify if worsening symptoms.  85% of patients with chronic urticaria do resolve in 2 years.  Went over specific triggers for this.  Otherwise, follow-up as needed.               "

## 2021-06-21 NOTE — LETTER
Letter by Lexii Bravo MD at      Author: Lexii Bravo MD Service: -- Author Type: --    Filed:  Encounter Date: 5/21/2021 Status: (Other)         Andrew Monique Jr.   [address] [address]             May 21, 2021         Dear Mr. Monique,    Below are the results from your recent visit:    Resulted Orders   AST   Result Value Ref Range    AST 28 0 - 40 U/L   ALT   Result Value Ref Range    ALT 41 0 - 45 U/L   Creatinine   Result Value Ref Range    Creatinine 0.95 0.70 - 1.30 mg/dL    GFR MDRD Af Amer >60 >60 mL/min/1.73m2    GFR MDRD Non Af Amer >60 >60 mL/min/1.73m2   Tryptase   Result Value Ref Range    Tryptase 6.4 <11.0 ug/L      Comment:        Performed and/or entered by:  [address] [address]    TSH   Result Value Ref Range    TSH 1.29 0.30 - 5.00 uIU/mL   HM1 (CBC with Diff)   Result Value Ref Range    WBC 8.0 4.0 - 11.0 thou/uL    RBC 4.90 4.40 - 6.20 mill/uL    Hemoglobin 14.3 14.0 - 18.0 g/dL    Hematocrit 41.7 40.0 - 54.0 %    MCV 85 80 - 100 fL    MCH 29.2 27.0 - 34.0 pg    MCHC 34.3 32.0 - 36.0 g/dL    RDW 12.9 11.0 - 14.5 %    Platelets 237 140 - 440 thou/uL    MPV 10.6 (H) 7.0 - 10.0 fL    Neutrophils % 63 50 - 70 %    Lymphocytes % 25 20 - 40 %    Monocytes % 11 (H) 2 - 10 %    Eosinophils % 0 0 - 6 %    Basophils % 1 0 - 2 %    Immature Granulocyte % 0 <=0 %    Neutrophils Absolute 5.0 2.0 - 7.7 thou/uL    Lymphocytes Absolute 2.0 0.8 - 4.4 thou/uL    Monocytes Absolute 0.8 0.0 - 0.9 thou/uL    Eosinophils Absolute 0.0 0.0 - 0.4 thou/uL    Basophils Absolute 0.1 0.0 - 0.2 thou/uL    Immature Granulocyte Absolute 0.0 <=0.0 thou/uL   Vitamin D, Total (25-Hydroxy)   Result Value Ref Range    Vitamin D, Total (25-Hydroxy) 13.5 (L) 30.0 - 80.0 ng/mL    Narrative    Deficiency <10.0 ng/mL  Insufficiency 10.0-29.9 ng/mL  Sufficiency 30.0-80.0 ng/mL  Toxicity (possible) >100.0 ng/mL     Vitamin D level was low.   Please take 50,000 units of vitamin D weekly for 8 weeks.  Recheck level at that time.  Start 1000 units at least of vitamin D3 over the counter daily as well.  All other labs normal.  Low vitamin D can bring out hives.      Please call with questions or contact us using [employer].    Sincerely,        Electronically signed by Lexii Bravo MD

## 2021-07-20 ENCOUNTER — TELEPHONE (OUTPATIENT)
Dept: ALLERGY | Facility: CLINIC | Age: 61
End: 2021-07-20

## 2021-07-20 NOTE — TELEPHONE ENCOUNTER
Dr. Bravo   This person called and is requesting a call back:    Name Of Person Who Called: Andrew    Why Did The Person Call: needs orders for lab work sent to [facility] in [facility] fax [phone]    Best Phone Number To Call Back: [phone]    Okay To Leave A Detailed Voicemail? yes    Thank you.

## 2021-07-30 ENCOUNTER — AMBULATORY - RIVER FALLS (OUTPATIENT)
Dept: FAMILY MEDICINE | Facility: CLINIC | Age: 61
End: 2021-07-30
Payer: COMMERCIAL

## 2021-07-31 ENCOUNTER — COMMUNICATION - RIVER FALLS (OUTPATIENT)
Dept: FAMILY MEDICINE | Facility: CLINIC | Age: 61
End: 2021-07-31
Payer: COMMERCIAL

## 2021-07-31 LAB
A/G RATIO - HISTORICAL: 2.1 (ref 1–2.5)
ALBUMIN SERPL-MCNC: 4.7 GM/DL (ref 3.6–5.1)
ALP SERPL-CCNC: 80 UNIT/L (ref 35–144)
ALT SERPL W P-5'-P-CCNC: 41 UNIT/L (ref 9–46)
AST SERPL W P-5'-P-CCNC: 26 UNIT/L (ref 10–35)
BILIRUB SERPL-MCNC: 0.4 MG/DL (ref 0.2–1.2)
BUN SERPL-MCNC: 17 MG/DL (ref 7–25)
BUN/CREAT RATIO - HISTORICAL: ABNORMAL (ref 6–22)
CALCIUM SERPL-MCNC: 10.1 MG/DL (ref 8.6–10.3)
CHLORIDE BLD-SCNC: 105 MMOL/L (ref 98–110)
CHOLEST SERPL-MCNC: 179 MG/DL
CHOLEST/HDLC SERPL: 5.3 {RATIO}
CO2 SERPL-SCNC: 28 MMOL/L (ref 20–32)
CREAT SERPL-MCNC: 0.96 MG/DL (ref 0.7–1.25)
EGFRCR SERPLBLD CKD-EPI 2021: 85 ML/MIN/1.73M2
GLOBULIN: 2.2 (ref 1.9–3.7)
GLUCOSE BLD-MCNC: 101 MG/DL (ref 65–99)
HDLC SERPL-MCNC: 34 MG/DL
LDLC SERPL CALC-MCNC: ABNORMAL MG/DL
NONHDLC SERPL-MCNC: 145 MG/DL
POTASSIUM BLD-SCNC: 4.3 MMOL/L (ref 3.5–5.3)
PROT SERPL-MCNC: 6.9 GM/DL (ref 6.1–8.1)
SODIUM SERPL-SCNC: 140 MMOL/L (ref 135–146)
TRIGL SERPL-MCNC: 555 MG/DL

## 2021-09-09 ENCOUNTER — OFFICE VISIT - RIVER FALLS (OUTPATIENT)
Dept: FAMILY MEDICINE | Facility: CLINIC | Age: 61
End: 2021-09-09
Payer: COMMERCIAL

## 2021-10-05 ENCOUNTER — OFFICE VISIT - RIVER FALLS (OUTPATIENT)
Dept: FAMILY MEDICINE | Facility: CLINIC | Age: 61
End: 2021-10-05
Payer: COMMERCIAL

## 2021-10-05 ASSESSMENT — MIFFLIN-ST. JEOR: SCORE: 1649.04

## 2021-10-10 LAB
1,25(OH)2D SERPL-MCNC: 50 PG/ML (ref 18–72)
A/G RATIO - HISTORICAL: 1.9 (ref 1–2.5)
ALBUMIN SERPL-MCNC: 4.6 GM/DL (ref 3.6–5.1)
ALP SERPL-CCNC: 95 UNIT/L (ref 35–144)
ALT SERPL W P-5'-P-CCNC: 40 UNIT/L (ref 9–46)
AST SERPL W P-5'-P-CCNC: 25 UNIT/L (ref 10–35)
BILIRUB SERPL-MCNC: 0.4 MG/DL (ref 0.2–1.2)
BUN SERPL-MCNC: 12 MG/DL (ref 7–25)
BUN/CREAT RATIO - HISTORICAL: ABNORMAL (ref 6–22)
CALCIUM SERPL-MCNC: 10.4 MG/DL (ref 8.6–10.3)
CHLORIDE BLD-SCNC: 105 MMOL/L (ref 98–110)
CO2 SERPL-SCNC: 25 MMOL/L (ref 20–32)
CREAT SERPL-MCNC: 1.04 MG/DL (ref 0.7–1.25)
EGFRCR SERPLBLD CKD-EPI 2021: 77 ML/MIN/1.73M2
ERYTHROCYTE [DISTWIDTH] IN BLOOD BY AUTOMATED COUNT: 13.4 % (ref 11–15)
ERYTHROCYTE [SEDIMENTATION RATE] IN BLOOD BY WESTERGREN METHOD: 11 MM/H
GLOBULIN: 2.4 (ref 1.9–3.7)
GLUCOSE BLD-MCNC: 111 MG/DL (ref 65–99)
HCT VFR BLD AUTO: 42 % (ref 38.5–50)
HGB BLD-MCNC: 14.5 GM/DL (ref 13.2–17.1)
MCH RBC QN AUTO: 28.8 PG (ref 27–33)
MCHC RBC AUTO-ENTMCNC: 34.5 GM/DL (ref 32–36)
MCV RBC AUTO: 83.5 FL (ref 80–100)
PLATELET # BLD AUTO: 263 10*3/UL (ref 140–400)
PMV BLD: 11.1 FL (ref 7.5–12.5)
POTASSIUM BLD-SCNC: 4.2 MMOL/L (ref 3.5–5.3)
PROT SERPL-MCNC: 7 GM/DL (ref 6.1–8.1)
RBC # BLD AUTO: 5.03 10*6/UL (ref 4.2–5.8)
SODIUM SERPL-SCNC: 140 MMOL/L (ref 135–146)
TRYPTASE: 7.5 MCG/L
TSH SERPL DL<=0.005 MIU/L-ACNC: 2.67 MIU/L (ref 0.4–4.5)
VITAMIN D2, 1,25 (OH)2 - QUEST: <8 PG/ML
VITAMIN D3, 1,25 (OH)2 - QUEST: 50 PG/ML
WBC # BLD AUTO: 8.4 10*3/UL (ref 3.8–10.8)

## 2021-10-11 ENCOUNTER — COMMUNICATION - RIVER FALLS (OUTPATIENT)
Dept: FAMILY MEDICINE | Facility: CLINIC | Age: 61
End: 2021-10-11
Payer: COMMERCIAL

## 2021-10-21 ENCOUNTER — COMMUNICATION - RIVER FALLS (OUTPATIENT)
Dept: FAMILY MEDICINE | Facility: CLINIC | Age: 61
End: 2021-10-21
Payer: COMMERCIAL

## 2021-11-04 ENCOUNTER — OFFICE VISIT - RIVER FALLS (OUTPATIENT)
Dept: FAMILY MEDICINE | Facility: CLINIC | Age: 61
End: 2021-11-04
Payer: COMMERCIAL

## 2021-11-04 ASSESSMENT — MIFFLIN-ST. JEOR: SCORE: 1661.28

## 2021-12-21 ENCOUNTER — COMMUNICATION - RIVER FALLS (OUTPATIENT)
Dept: FAMILY MEDICINE | Facility: CLINIC | Age: 61
End: 2021-12-21
Payer: COMMERCIAL

## 2022-02-11 VITALS
BODY MASS INDEX: 30.6 KG/M2 | HEART RATE: 101 BPM | SYSTOLIC BLOOD PRESSURE: 112 MMHG | WEIGHT: 188 LBS | BODY MASS INDEX: 30.22 KG/M2 | HEIGHT: 66 IN | SYSTOLIC BLOOD PRESSURE: 148 MMHG | SYSTOLIC BLOOD PRESSURE: 108 MMHG | HEIGHT: 66 IN | HEART RATE: 80 BPM | DIASTOLIC BLOOD PRESSURE: 64 MMHG | SYSTOLIC BLOOD PRESSURE: 126 MMHG | TEMPERATURE: 99.8 F | OXYGEN SATURATION: 94 % | TEMPERATURE: 102.6 F | HEART RATE: 93 BPM | OXYGEN SATURATION: 95 % | WEIGHT: 190.4 LBS | BODY MASS INDEX: 29.96 KG/M2 | TEMPERATURE: 98.5 F | HEIGHT: 66 IN | WEIGHT: 185.2 LBS | HEART RATE: 64 BPM | TEMPERATURE: 98.7 F | DIASTOLIC BLOOD PRESSURE: 66 MMHG | DIASTOLIC BLOOD PRESSURE: 78 MMHG | HEIGHT: 66 IN | DIASTOLIC BLOOD PRESSURE: 80 MMHG | WEIGHT: 186.4 LBS | BODY MASS INDEX: 29.77 KG/M2

## 2022-02-11 VITALS
HEART RATE: 97 BPM | SYSTOLIC BLOOD PRESSURE: 116 MMHG | BODY MASS INDEX: 31.34 KG/M2 | BODY MASS INDEX: 30.92 KG/M2 | SYSTOLIC BLOOD PRESSURE: 112 MMHG | HEART RATE: 70 BPM | OXYGEN SATURATION: 94 % | WEIGHT: 195 LBS | TEMPERATURE: 97.4 F | WEIGHT: 192.4 LBS | DIASTOLIC BLOOD PRESSURE: 80 MMHG | DIASTOLIC BLOOD PRESSURE: 62 MMHG | HEIGHT: 66 IN | HEIGHT: 66 IN

## 2022-02-11 VITALS
DIASTOLIC BLOOD PRESSURE: 82 MMHG | HEIGHT: 67 IN | SYSTOLIC BLOOD PRESSURE: 134 MMHG | DIASTOLIC BLOOD PRESSURE: 86 MMHG | HEART RATE: 88 BPM | HEART RATE: 84 BPM | HEIGHT: 67 IN | SYSTOLIC BLOOD PRESSURE: 126 MMHG | TEMPERATURE: 97.8 F | BODY MASS INDEX: 29.7 KG/M2 | BODY MASS INDEX: 29.41 KG/M2 | TEMPERATURE: 97.9 F | WEIGHT: 189.2 LBS

## 2022-02-11 VITALS
HEIGHT: 66 IN | WEIGHT: 202.5 LBS | TEMPERATURE: 98.6 F | HEART RATE: 80 BPM | DIASTOLIC BLOOD PRESSURE: 81 MMHG | BODY MASS INDEX: 32.54 KG/M2 | OXYGEN SATURATION: 96 % | SYSTOLIC BLOOD PRESSURE: 128 MMHG

## 2022-02-11 VITALS
HEART RATE: 80 BPM | SYSTOLIC BLOOD PRESSURE: 116 MMHG | BODY MASS INDEX: 31.47 KG/M2 | WEIGHT: 195 LBS | TEMPERATURE: 98.3 F | DIASTOLIC BLOOD PRESSURE: 80 MMHG

## 2022-02-11 VITALS
BODY MASS INDEX: 30.85 KG/M2 | WEIGHT: 199.8 LBS | HEART RATE: 74 BPM | TEMPERATURE: 97.3 F | DIASTOLIC BLOOD PRESSURE: 76 MMHG | DIASTOLIC BLOOD PRESSURE: 78 MMHG | HEART RATE: 92 BPM | BODY MASS INDEX: 32.11 KG/M2 | SYSTOLIC BLOOD PRESSURE: 118 MMHG | OXYGEN SATURATION: 99 % | HEIGHT: 66 IN | TEMPERATURE: 98.4 F | WEIGHT: 197 LBS | SYSTOLIC BLOOD PRESSURE: 130 MMHG

## 2022-02-11 VITALS
HEART RATE: 83 BPM | DIASTOLIC BLOOD PRESSURE: 86 MMHG | SYSTOLIC BLOOD PRESSURE: 130 MMHG | OXYGEN SATURATION: 97 % | SYSTOLIC BLOOD PRESSURE: 136 MMHG | TEMPERATURE: 97.7 F | HEART RATE: 71 BPM | OXYGEN SATURATION: 97 % | HEART RATE: 83 BPM | BODY MASS INDEX: 32.6 KG/M2 | TEMPERATURE: 97.9 F | WEIGHT: 202 LBS | DIASTOLIC BLOOD PRESSURE: 70 MMHG | SYSTOLIC BLOOD PRESSURE: 131 MMHG | TEMPERATURE: 97.4 F | BODY MASS INDEX: 32.96 KG/M2 | OXYGEN SATURATION: 96 % | WEIGHT: 204.2 LBS | DIASTOLIC BLOOD PRESSURE: 82 MMHG

## 2022-02-11 VITALS
HEART RATE: 76 BPM | OXYGEN SATURATION: 97 % | HEIGHT: 66 IN | HEIGHT: 66 IN | DIASTOLIC BLOOD PRESSURE: 92 MMHG | BODY MASS INDEX: 30.93 KG/M2 | SYSTOLIC BLOOD PRESSURE: 133 MMHG | BODY MASS INDEX: 30.79 KG/M2 | WEIGHT: 191.6 LBS

## 2022-02-11 VITALS
WEIGHT: 192.4 LBS | HEIGHT: 66 IN | HEART RATE: 68 BPM | BODY MASS INDEX: 30.92 KG/M2 | SYSTOLIC BLOOD PRESSURE: 130 MMHG | DIASTOLIC BLOOD PRESSURE: 80 MMHG

## 2022-02-11 VITALS
WEIGHT: 190.4 LBS | SYSTOLIC BLOOD PRESSURE: 112 MMHG | HEIGHT: 66 IN | TEMPERATURE: 97.3 F | HEART RATE: 72 BPM | DIASTOLIC BLOOD PRESSURE: 64 MMHG | BODY MASS INDEX: 30.6 KG/M2

## 2022-02-11 VITALS
WEIGHT: 190 LBS | DIASTOLIC BLOOD PRESSURE: 60 MMHG | HEIGHT: 66 IN | TEMPERATURE: 97.4 F | BODY MASS INDEX: 30.53 KG/M2 | HEART RATE: 66 BPM | SYSTOLIC BLOOD PRESSURE: 110 MMHG

## 2022-02-11 VITALS
SYSTOLIC BLOOD PRESSURE: 126 MMHG | HEART RATE: 68 BPM | DIASTOLIC BLOOD PRESSURE: 74 MMHG | BODY MASS INDEX: 30.47 KG/M2 | WEIGHT: 189.6 LBS | HEIGHT: 66 IN | TEMPERATURE: 97.4 F

## 2022-02-11 VITALS
HEIGHT: 66 IN | TEMPERATURE: 98.1 F | BODY MASS INDEX: 29.99 KG/M2 | WEIGHT: 186.6 LBS | DIASTOLIC BLOOD PRESSURE: 80 MMHG | SYSTOLIC BLOOD PRESSURE: 120 MMHG | HEART RATE: 68 BPM

## 2022-02-11 VITALS
HEIGHT: 66 IN | BODY MASS INDEX: 30.37 KG/M2 | DIASTOLIC BLOOD PRESSURE: 74 MMHG | HEART RATE: 77 BPM | SYSTOLIC BLOOD PRESSURE: 111 MMHG | WEIGHT: 189 LBS

## 2022-02-16 NOTE — PROGRESS NOTES
Patient:   CANDICE FREY JR            MRN: [id number]            FIN: [id number]               Age:   59 years     Sex:  Male     :  1960   Associated Diagnoses:   Pneumonia   Author:   Aydin Yu MD      Visit Information      Date of Service: 10/29/2019 03:11 pm  Performing Location: [facility]  Encounter#: [id number]      Primary Care Provider (PCP):  Aydin Yu MD    NPI# [id number]      Referring Provider:  Aydin Yu MD    NPI# [id number]      Chief Complaint   10/29/2019 3:27 PM CDT   Follow up cough     Chief complaint and symptoms noted above confirmed with patient.      History of Present Illness             The patient presents with Was dx with pneumonia, treated, still coughing.  No fever or chills.  Previous X-Ray reviewed..     Albuterol helps but doesn't last      Review of Systems   Constitutional:  Negative.    Eye:  Negative.    Ear/Nose/Mouth/Throat:  Nasal congestion, No ear pain, No sore throat.    Respiratory:  Shortness of breath, Cough, No sputum production, No hemoptysis, No wheezing.    Cardiovascular:  Negative.    Gastrointestinal:  Negative.    Genitourinary:  Negative.    Musculoskeletal:  Negative.    Integumentary:  Negative.    Neurologic:  Negative.    Psychiatric:  Negative.              Health Status   Allergies:    Allergic Reactions (Selected)  Severity Not Documented  Codeine (Nausea)   Medications:  (Selected)   Prescriptions  Prescribed  CPAP 7 cm water pressure: CPAP 7 cm water pressure, See Instructions, Instructions: Heated humidifier, heated tubing, filters, nasal or full face mask of choice with headgear.  Replacement cushions and supplies as needed., Supply, # 1 EA, 11 Refill(s), Type: Maintenance  albuterol 90 mcg/inh inhalation aerosol: 2 puff(s), Inhale, q4 hrs, # 17 gm, 5 Refill(s), Type: Maintenance, Pharmacy: [id number], 2 puff(s) Inhale q4 hrs  lisinopril 40 mg oral tablet: = 1 tab(s) ( 40 mg ), po,  daily, # 90 tab(s), 3 Refill(s), Type: Maintenance, Pharmacy: [facility] [id number] [facility], hold on file - will contact when refills are needed, 1 tab(s) Oral daily  omega-3 polyunsaturated fatty acids 1000 mg oral capsule: = 2 cap(s) ( 2,000 mg ), Oral, bid, # 360 cap(s), 1 Refill(s), Type: Maintenance, [id number], 2 cap(s) Oral bid  omeprazole 40 mg oral delayed release capsule: = 1 cap(s), Oral, daily, # 90 cap(s), 1 Refill(s), Type: Maintenance, Pharmacy: [id number]  Documented Medications  Documented  aspirin 81 mg oral tablet: 1 tab(s) ( 81 mg ), PO, Daily, 0   Problem list:    All Problems (Selected)  Allergic Rhinitis / ICD-9-.9 / Confirmed  Essential familial hyperlipidemia / ICD-9-.2 / Confirmed  GERD (Gastroesophageal Reflux Disease) / ICD-9-.81 / Confirmed  HTN (Hypertension) / ICD-9-.9 / Confirmed  Mixed hyperlipidemia / SNOMED CT 071173369 / Confirmed  Obesity / SNOMED CT 5570772680 / Probable  Severe obstructive sleep apnea / SNOMED CT 328396406 / Confirmed  Shift work sleep disorder / SNOMED CT 342514391 / Confirmed      Histories   Past Medical History:    Resolved  Bowel obstruction (SNOMED CT 386165138): Onset in 1989 at 28 years.  Resolved.  Appendectomy (SNOMED CT 913300349): Onset in 1986 at 25 years.  Resolved.   Family History:    Diabetes mellitus  Father  Hypercholesterolemia  Father     Procedure history:    Polysomnography (SNOMED CT 847474908) on 3/7/2016 at 55 Years.  Comments:  3/23/2016 2:33 PM CDT - Serena Tristan CMA  AHI: 36.8  Colonoscopy (SNOMED CT 910430839) performed by Stef Herring MD on 10/30/2015 at 55 Years.  Comments:  11/17/2015 4:44 PM GEORGETTE - Debra Liang RN  Sedation: Fentanyl and Midazolam  Indication: Personal history of colon polyps  Findings: Single tubular adenoma no high grade dysplasia. Diverticula in the sigmoid colon.  F/U: Repeat colonoscopy in 5 years  Colonoscopy (SNOMED CT 491244154) on  4/16/2010 at 49 Years.  Comments:  4/30/2012 9:39 AM CDT - Slime Hummel MA  3 polyps removed, 2 were adenoma's  repeat in 5 years  Exploration of abdomen (SNOMED CT 794200589) performed by Ricky Figueroa DO on 7/15/1993 at 32 Years.  Inguinal herniorrhaphy (SNOMED CT 14755053) performed by Be Galvez MD on 1/24/1992 at 31 Years.  Comments:  4/19/2016 7:02 AM CDT - Blanca Reeves  Laparoscopic left with mesh.  Appendectomy (SNOMED CT 686386457) performed by Ramos Wilson MD on 1/21/1986 at 25 Years.  Exploration of abdomen (SNOMED CT 203334168) in the month of 2/1960 at 5 Months.  Comments:  4/19/2016 6:56 AM CDT - Blanca Reeves  Diverticulosis vs intussusception   Social History:        Alcohol Assessment            Current                     Comments:                      10/02/2012 - Lilly Graves LPN                     Rare      Tobacco Assessment            Never      Substance Abuse Assessment            Never      Employment and Education Assessment            Employed, Work/School description: [employer].      Home and Environment Assessment            Marital status:  (Living together).  Spouse/Partner name: Liana.  Lives with Children, Spouse.  4               children.  Living situation: Home/Independent.      Nutrition and Health Assessment            Type of diet: Regular.      Exercise and Physical Activity Assessment                     Comments:                      10/02/2012 - Lilly Graves LPN                     Active @ work, outdoors.      Sexual Assessment            Sexual orientation: Heterosexual.        Physical Examination   Vital Signs   10/29/2019 3:27 PM CDT Temperature Tympanic 98.5 DegF    Peripheral Pulse Rate 64 bpm    Pulse Site Radial artery    HR Method Manual    Systolic Blood Pressure 126 mmHg    Diastolic Blood Pressure 78 mmHg    Mean Arterial Pressure 94 mmHg    BP Site Left arm    BP Method Manual      Measurements from flowsheet :  Measurements   10/29/2019 3:27 PM CDT Height Measured - Standard 66 in    Weight Measured - Standard 188 lb    BSA 1.99 m2    Body Mass Index 30.34 kg/m2  HI      General:  Alert and oriented, No acute distress.    Eye:  Pupils are equal, round and reactive to light, Extraocular movements are intact, Normal conjunctiva.    HENT:  Normocephalic, Oral mucosa is moist, No pharyngeal erythema.    Neck:  Supple, Non-tender.    Respiratory:  Respirations are non-labored, Breath sounds are equal, Symmetrical chest wall expansion,      Breath sounds: no audible.         Pattern: Regular.    Cardiovascular:  Normal rate, Regular rhythm, No murmur, Normal peripheral perfusion, No edema.    Gastrointestinal:  Soft, Non-tender, Non-distended.    Musculoskeletal:  Normal range of motion, Normal strength, No tenderness, No swelling.    Integumentary:  Warm, Dry, No rash.    Neurologic:  Alert, Oriented, Normal sensory, Normal motor function, No focal deficits.    Psychiatric:  Cooperative, Appropriate mood & affect, Normal judgment.       Review / Management   Radiology results   X-ray, looks better to me.  Will have radiology over read.      Impression and Plan   Diagnosis     Pneumonia (JCS11-OM J18.9).     Course:  Resolved.    Plan:  Patient is doing well as progressing as expected. Lungs are clear and expect patient to continue to improve over the next couple of weeks. Patient was counseled to continue to monitor symptoms and return if concerned. .    Patient Instructions:       Counseled: Patient, Regarding diagnosis, Regarding treatment, Verbalized understanding.    Orders     Orders   Pharmacy:  predniSONE 20 mg oral tablet (Prescribe): = 2 tab(s) ( 40 mg ), PO, Daily, x 5 day(s), # 10 tab(s), 0 Refill(s), Type: Maintenance, Pharmacy: [id number], 2 tab(s) Oral daily,x5 day(s).        Professional Services   Counseling Summary:  This was a 15 minute visit with greater than 50% of that time spent counseling the  patient.    Counseling included treatment options, risks and benefits, lifestyle changes, and prognosis.    The patient was interactive, attentive, asked questions, and verbalized understanding.

## 2022-02-16 NOTE — NURSING NOTE
Comprehensive Intake Entered On:  3/27/2021 11:20 AM CDT    Performed On:  3/27/2021 11:15 AM CDT by Chaparrita Bates               Summary   Chief Complaint :   Recurring hives.  Last night, had episode of swollen lips and face.     Menstrual Status :   N/A   Systolic Blood Pressure :   131 mmHg (HI)    Diastolic Blood Pressure :   86 mmHg (HI)    Mean Arterial Pressure :   101 mmHg   Peripheral Pulse Rate :   83 bpm   BP Method :   Electronic   HR Method :   Electronic   Temperature Tympanic :   97.9 DegF(Converted to: 36.6 DegC)    Oxygen Saturation :   97 %   Chaparrita Bates - 3/27/2021 11:15 AM CDT   Health Status   Allergies Verified? :   Yes   Medication History Verified? :   Yes   Immunizations Current :   Other: Pt. checking work for record.   Tobacco Use? :   Never smoker   Chaparrita Bates - 3/27/2021 11:15 AM CDT   Meds / Allergies   (As Of: 3/27/2021 11:20:24 AM CDT)   Allergies (Active)   codeine  Estimated Onset Date:   Unspecified ; Reactions:   nausea ; Created By:   Luisa Ramirez MD; Reaction Status:   Active ; Category:   Drug ; Substance:   codeine ; Type:   Allergy ; Updated By:   Luisa Ramirez MD; Reviewed Date:   1/31/2021 10:24 AM CST        Medication List   (As Of: 3/27/2021 11:20:24 AM CDT)   Prescription/Discharge Order    predniSONE  :   predniSONE ; Status:   Prescribed ; Ordered As Mnemonic:   predniSONE 10 mg oral tablet ; Simple Display Line:   See Instructions, 4 tabs daily for 4 days then 3 tabs daily for 4 days then 2 tabs daily for 4 days then 1 tab daily for 4 days then 1/2 tab daily for 4 days, 42 tab(s), 0 Refill(s) ; Ordering Provider:   Aydin Yu MD; Catalog Code:   predniSONE ; Order Dt/Tm:   3/25/2021 2:19:07 PM CDT          fluticasone-salmeterol  :   fluticasone-salmeterol ; Status:   Prescribed ; Ordered As Mnemonic:   AirDuo RespiClick 113 mcg-14 mcg/inh inhalation powder ; Simple Display Line:   See Instructions, Inhale 1 puff by mouth bid;    rinse mouth and throat after use, 3 EA, 1 Refill(s) ; Ordering Provider:   Aydin Yu MD; Catalog Code:   fluticasone-salmeterol ; Order Dt/Tm:   2/9/2021 2:08:22 PM CST          lisinopril  :   lisinopril ; Status:   Prescribed ; Ordered As Mnemonic:   lisinopril 40 mg oral tablet ; Simple Display Line:   1 tab(s), Oral, daily, 90 tab(s), 3 Refill(s) ; Ordering Provider:   Aydin Yu MD; Catalog Code:   lisinopril ; Order Dt/Tm:   6/9/2020 9:27:03 AM CDT          omeprazole  :   omeprazole ; Status:   Prescribed ; Ordered As Mnemonic:   omeprazole 40 mg oral delayed release capsule ; Simple Display Line:   1 cap(s), Oral, daily, 90 cap(s), 3 Refill(s) ; Ordering Provider:   Aydin Yu MD; Catalog Code:   omeprazole ; Order Dt/Tm:   6/9/2020 9:27:15 AM CDT          rosuvastatin  :   rosuvastatin ; Status:   Prescribed ; Ordered As Mnemonic:   rosuvastatin 20 mg oral tablet ; Simple Display Line:   20 mg, 1 tab(s), Oral, daily, 90 tab(s), 3 Refill(s) ; Ordering Provider:   Aydin Yu MD; Catalog Code:   rosuvastatin ; Order Dt/Tm:   6/9/2020 9:27:34 AM CDT          albuterol  :   albuterol ; Status:   Prescribed ; Ordered As Mnemonic:   albuterol 90 mcg/inh inhalation aerosol ; Simple Display Line:   2 puff(s), Inhale, q4 hrs, 3 EA, 3 Refill(s) ; Ordering Provider:   Aydin Yu MD; Catalog Code:   albuterol ; Order Dt/Tm:   6/9/2020 9:25:48 AM CDT          indomethacin  :   indomethacin ; Status:   Prescribed ; Ordered As Mnemonic:   indomethacin 50 mg oral capsule ; Simple Display Line:   50 mg, 1 cap(s), Oral, tid, for 7 day(s), PRN: for arthritis, 30 cap(s), 1 Refill(s) ; Ordering Provider:   Janice Guillory PA-C A; Catalog Code:   indomethacin ; Order Dt/Tm:   1/5/2020 8:55:37 AM CST          Miscellaneous Rx Supply  :   Miscellaneous Rx Supply ; Status:   Prescribed ; Ordered As Mnemonic:   CPAP 7 cm water pressure ; Simple Display Line:   See  Instructions, Heated humidifier, heated tubing, filters, nasal or full face mask of choice with headgear.  Replacement cushions and supplies as needed., 1 EA, 11 Refill(s) ; Ordering Provider:   Luisa Ramirez MD; Catalog Code:   Miscellaneous Rx Supply ; Order Dt/Tm:   1/14/2019 8:55:38 AM CST            Home Meds    aspirin  :   aspirin ; Status:   Documented ; Ordered As Mnemonic:   aspirin 81 mg oral tablet ; Simple Display Line:   81 mg, 1 tab(s), PO, Daily ; Catalog Code:   aspirin ; Order Dt/Tm:   1/7/2010 1:05:19 PM CST            ID Risk Screen   Recent Travel History :   No recent travel   Family Member Travel History :   No recent travel   Other Exposure to Infectious Disease :   Unknown   COVID-19 Testing Status :   No positive COVID-19 test   Chaparrita Bates - 3/27/2021 11:15 AM CDT   Social History   Social History   (As Of: 3/27/2021 11:20:25 AM CDT)   Alcohol:        Current   Comments:  10/2/2012 10:12 AM - Llily Graves LPN: Rare   (Last Updated: 10/2/2012 10:12:11 AM CDT by Lilly Graves LPN)          Tobacco:        Never (less than 100 in lifetime)   (Last Updated: 12/21/2020 11:06:02 AM CST by Lotus Connell CMA)          Electronic Cigarette/Vaping:        Electronic Cigarette Use: Never.   (Last Updated: 12/21/2020 11:05:58 AM CST by Lotus Connell CMA)          Substance Abuse:        Never   (Last Updated: 10/2/2012 10:12:22 AM CDT by Lilly Graves LPN)          Employment/School:        Employed, Work/School description: [email or web address]   (Last Updated: 10/2/2012 10:12:43 AM CDT by Lilly Graves LPN)          Home/Environment:        Marital status:  (Living together).  Spouse/Partner name: Liana.  Lives with Children, Spouse.  4 children.  Living situation: Home/Independent.   (Last Updated: 10/2/2012 10:13:09 AM CDT by Lilly Graves LPN)          Nutrition/Health:        Type of diet: Regular.   (Last Updated: 10/2/2012 10:13:15 AM CDT by Uriel Graves LPN           Exercise:         Comments:  10/2/2012 10:13 AM - Lilly Graves LPN: Active @ work, outdoors.   (Last Updated: 10/2/2012 10:13:36 AM CDT by Lilly Graves LPN)          Sexual:        Sexual orientation: Heterosexual.   (Last Updated: 10/2/2012 10:13:42 AM CDT by Lilly Graves LPN)

## 2022-02-16 NOTE — PROGRESS NOTES
Patient:   CANDICE FREY JR            MRN: [id number]            FIN: [id number]               Age:   60 years     Sex:  Male     :  1960   Associated Diagnoses:   Skin rash   Author:   Braxton Hunter PA-C      Report Summary   Diagnosis  Skin rash ([id number] R21).  Patient InstructionsOrders   Visit Information      Date of Service: 03/15/2021 05:27 pm  Performing Location: [facility]  Encounter#: [id number]      Primary Care Provider (PCP):  Aydin Yu MD    NPI# [id number]      Referring Provider:  Rafael Arechiga MD    NPI# [id number]   Visit type:  Video Visit via Pax[employer] or [employer].    Participants in room during visit:  2   Location of patient:  Home  Location of provider:  _ (Clinic office )  Video Start Time:  170  Video End Time:   1710    Today's visit was conducted via video conference due to the COVID-19 pandemic.  The patient's consent to proceed with a video visit has been obtained and documented.      Chief Complaint   Rash      History of Present Illness   Patient is a 60 year old M who is being evaluated via a billable video visit. Recurrent hives. Thinks due to soap. Has happened before. No problems swallowing or difficulty breathing. Has Zyrtec. Short course prednisone works well previously.       Review of Systems   Constitutional:  Negative.    Eye:  Negative.    Ear/Nose/Mouth/Throat:  Negative.    Respiratory:  Negative.    Cardiovascular:  Negative.    Gastrointestinal:  Negative.    Genitourinary:  Negative.    Immunologic:  Negative.    Musculoskeletal:  Negative.    Integumentary:  Rash.    Neurologic:  Negative.    Psychiatric:  Negative.       Health Status   Allergies:    Allergic Reactions (Selected)  Severity Not Documented  Codeine (Nausea)   Medications:  (Selected)   Prescriptions  Prescribed  AirDuo RespiClick 113 mcg-14 mcg/inh inhalation powder: See Instructions, Instructions: Inhale 1 puff by mouth bid;   rinse mouth and throat after  use, # 3 EA, 1 Refill(s), Type: Maintenance, Pharmacy: [id number], sending 3 month supply to see if lower cost, Inhale 1 puff by mouth bid; ; rinse m...  CPAP 7 cm water pressure: CPAP 7 cm water pressure, See Instructions, Instructions: Heated humidifier, heated tubing, filters, nasal or full face mask of choice with headgear.  Replacement cushions and supplies as needed., Supply, # 1 EA, 11 Refill(s), Type: Maintenance  albuterol 90 mcg/inh inhalation aerosol: 2 puff(s), Inhale, q4 hrs, # 3 EA, 3 Refill(s), Type: Maintenance, Pharmacy: [facility], 2 puff(s) Inhale q4 hrs, 66, in, 06/09/20 8:55:00 CDT, Height Measured, 191.6, lb, 06/02/20 10:48:00 CDT, Weight Measured  indomethacin 50 mg oral capsule: = 1 cap(s) ( 50 mg ), Oral, tid, PRN: for arthritis, # 30 cap(s), 1 Refill(s), Type: Maintenance, Pharmacy: [facility], 1 cap(s) Oral tid,x7 day(s),PRN:for arthritis  lisinopril 40 mg oral tablet: = 1 tab(s), Oral, daily, # 90 tab(s), 3 Refill(s), Type: Maintenance, Pharmacy: [id number], 1 tab(s) Oral daily, 66, in, 06/09/20 8:55:00 CDT, Height Measured, 191.6, lb, 06/02/20 10:48:00 CDT, Weight Measured  omeprazole 40 mg oral delayed release capsule: = 1 cap(s), Oral, daily, # 90 cap(s), 3 Refill(s), Type: Maintenance, Pharmacy: [facility] #[id number], 1 cap(s) Oral daily, 66, in, 06/09/20 8:55:00 CDT, Height Measured, 191.6, lb, 06/02/20 10:48:00 CDT, Weight Measured  predniSONE 20 mg oral tablet: = 2 tab(s) ( 40 mg ), Oral, daily, x 7 day(s), # 14 tab(s), 0 Refill(s), Type: Acute, Pharmacy: [facility], 2 tab(s) Oral daily,x7 day(s), 66, in, 06/09/20 8:55:00 CDT, Height Measured, 195, lb, 12/21/20 11:05:00 CST, Weight Measured  rosuvastatin 20 mg oral tablet: = 1 tab(s) ( 20 mg ), Oral, daily, # 90 tab(s), 3 Refill(s), Type: Maintenance, Pharmacy: [id number], 1 tab(s) Oral daily, 66, in, 06/09/20 8:55:00 CDT, Height Measured, 191.6,  lb, 06/02/20 10:48:00 CDT, Weight Measured  Documented Medications  Documented  aspirin 81 mg oral tablet: 1 tab(s) ( 81 mg ), PO, Daily, 0   Problem list:    All Problems  Shift work sleep disorder / SNOMED CT 683809635 / Confirmed  Severe obstructive sleep apnea / SNOMED CT 305393856 / Confirmed  Obesity / SNOMED CT 3722109611 / Probable  Mixed hyperlipidemia / SNOMED CT 522018547 / Confirmed  HTN (Hypertension) / ICD-9-.9 / Confirmed  GERD (Gastroesophageal Reflux Disease) / ICD-9-.81 / Confirmed  Essential familial hyperlipidemia / ICD-9-.2 / Confirmed  Allergic Rhinitis / ICD-9-.9 / Confirmed      Histories   Past Medical History:    Resolved  Bowel obstruction ([id number]): Onset in 1989 at 28 years.  Resolved.  Appendectomy ([id number]): Onset in 1986 at 25 years.  Resolved.   Family History:    Diabetes mellitus  Father  Hypercholesterolemia  Father     Procedure history:    Polysomnography ([id number]) on 3/7/2016 at 55 Years.  Comments:  3/23/2016 2:33 PM CDT - Serena Tristan CMA  AHI: 36.8  Colonoscopy ([id number]) on 10/30/2015 at 55 Years.  Comments:  11/17/2015 4:44 PM GEORGETTE - Debra Liang RN  Sedation: Fentanyl and Midazolam  Indication: Personal history of colon polyps  Findings: Single tubular adenoma no high grade dysplasia. Diverticula in the sigmoid colon.  F/U: Repeat colonoscopy in 5 years  Colonoscopy ([id number]) on 4/16/2010 at 49 Years.  Comments:  4/30/2012 9:39 AM CDT - Slime Hummel MA  3 polyps removed, 2 were adenoma's  repeat in 5 years  Exploration of abdomen ([id number]) on 7/15/1993 at 32 Years.  Inguinal herniorrhaphy ([id number]) on 1/24/1992 at 31 Years.  Comments:  4/19/2016 7:02 AM Blanca Hughes  Laparoscopic left with mesh.  Appendectomy ([id number]) on 1/21/1986 at 25 Years.  Exploration of abdomen ([id number]) in the month of 2/1960 at 5 Months.  Comments:  4/19/2016 6:56 AM Blanca Hughes  Diverticulosis vs intussusception   Social  History:        Electronic Cigarette/Vaping Assessment            Electronic Cigarette Use: Never.      Alcohol Assessment            Current                     Comments:                      10/02/2012 - Lilly Graves LPN                     Rare      Tobacco Assessment            Never (less than 100 in lifetime)      Substance Abuse Assessment            Never      Employment and Education Assessment            Employed, Work/School description: [employer]      Home and Environment Assessment            Marital status:  (Living together).  Spouse/Partner name: Liana.  Lives with Children, Spouse.  4               children.  Living situation: Home/Independent.      Nutrition and Health Assessment            Type of diet: Regular.      Exercise and Physical Activity Assessment                     Comments:                      10/02/2012 - Lilly Graves LPN                     Active @ work, outdoors.      Sexual Assessment            Sexual orientation: Heterosexual.        Physical Examination   General:  Alert and oriented, No acute distress.    Eye:  Pupils are equal, round and reactive to light, Normal conjunctiva.    HENT:  Oral mucosa is moist.    Neck:  Supple.    Respiratory:  Respirations are non-labored.    Psychiatric:  Cooperative, Appropriate mood & affect, Normal judgment.       Impression and Plan   Diagnosis     Skin rash (ADP39-EQ R21).     Patient Instructions:       Counseled: Patient, Regarding diagnosis, Regarding treatment, Regarding medications, Verbalized understanding.    Orders     Orders (Selected)   Prescriptions  Prescribed  predniSONE 20 mg oral tablet: = 2 tab(s) ( 40 mg ), Oral, daily, x 7 day(s), # 14 tab(s), 0 Refill(s), Type: Acute, Pharmacy: [facility], 2 tab(s) Oral daily,x7 day(s), 66, in, 06/09/20 8:55:00 CDT, Height Measured, 195, lb, 12/21/20 11:05:00 CST, Weight Measured.     FU if not progressing as prior or emergently if SOB problems  swallowing etc..        Health Maintenance      Recommendations     Pending (in the next year)        OverDue           Influenza Vaccine due  09/01/20  and every 1  year(s)           Alcohol Misuse Screen due  10/15/20  and every 1  year(s)           Depression Screen (Male) due  10/15/20  and every 1  year(s)           Colorectal Cancer Screen (Colonoscopy) due  10/30/20  and every 5  year(s)        Due            Hepatitis C Screen [id number] (Male) due  03/15/21  One-time only           Lung Cancer Screen (Male) due  03/15/21  and every 1  year(s)           Type 2 Diabetes Mellitus Screen (Male) due  03/15/21  Variable frequency           Zoster/Shingles Vaccine due  03/15/21  One-time only        Due In Future            Body Mass Index Check not due until  06/02/21  and every 1  year(s)           Lipid Disorders Screen (Male) not due until  08/18/21  and every 1  year(s)           High Blood Pressure Screen (Male) not due until  12/21/21  and every 1  year(s)           Obesity Screen and Counseling (Male) not due until  12/21/21  and every 1  year(s)           Tobacco Use Screen (Male) not due until  01/31/22  and every 1  year(s)     Satisfied (in the past 1 year)        Satisfied            Body Mass Index Check on  06/02/20.           High Blood Pressure Screen (Male) on  12/21/20.           High Blood Pressure Screen (Male) on  06/02/20.           Lipid Disorders Screen (Male) on  08/18/20.           Lipid Disorders Screen (Male) on  08/18/20.           Lipid Disorders Screen (Male) on  08/18/20.           Lipid Disorders Screen (Male) on  08/18/20.           Lipid Disorders Screen (Male) on  08/18/20.           Lipid Disorders Screen (Male) on  06/02/20.           Lipid Disorders Screen (Male) on  06/02/20.           Lipid Disorders Screen (Male) on  06/02/20.           Lipid Disorders Screen (Male) on  06/02/20.           Lipid Disorders Screen (Male) on  06/02/20.           Obesity Screen and Counseling  (Male) on  12/21/20.           Obesity Screen and Counseling (Male) on  06/02/20.           Tobacco Use Screen (Male) on  01/31/21.           Tobacco Use Screen (Male) on  12/21/20.           Tobacco Use Screen (Male) on  06/09/20.

## 2022-02-16 NOTE — NURSING NOTE
Depression Screening Entered On:  10/15/2019 4:52 PM CDT    Performed On:  10/15/2019 4:52 PM CDT by Trina Patino MA               Depression Screening   Little Interest - Pleasure in Activities :   Not at all   Feeling Down, Depressed, Hopeless :   Not at all   Initial Depression Screen Score :   0    Trouble Falling or Staying Asleep :   Nearly every day   Feeling Tired or Little Energy :   More than half the days   Poor Appetite or Overeating :   Not at all   Feeling Bad About Yourself :   Not at all   Trouble Concentrating :   Not at all   Moving or Speaking Slowly :   Not at all   Thoughts Better Off Dead or Hurting Self :   Not at all   Detailed Depression Screen Score :   5    Total Depression Screen Score :   5    NEHAL Difficulty with Work, Home, Others :   Somewhat difficult   Trina Patino MA - 10/15/2019 4:52 PM CDT

## 2022-02-16 NOTE — NURSING NOTE
Comprehensive Intake Entered On:  3/30/2021 1:29 PM CDT    Performed On:  3/30/2021 1:20 PM CDT by Lexii Matos CMA               Summary   Chief Complaint :   ED follow up. Improvement noted   Menstrual Status :   N/A   Weight Measured :   202 lb(Converted to: 202 lb 0 oz, 91.626 kg)    Height/Length Estimated :   66 in(Converted to: 5 ft 6 in, 167.64 cm)    Systolic Blood Pressure :   136 mmHg (HI)    Diastolic Blood Pressure :   82 mmHg (HI)    Mean Arterial Pressure :   100 mmHg   Peripheral Pulse Rate :   71 bpm   BP Site :   Right arm   BP Method :   Manual   Temperature Tympanic :   97.7 DegF(Converted to: 36.5 DegC)  (LOW)    Oxygen Saturation :   96 %   Lexii Matos CMA - 3/30/2021 1:20 PM CDT   Health Status   Immunizations Current :   Other: Pt. checking work for record.   Tobacco Use? :   Never smoker   Lexii Matos CMA - 3/30/2021 1:20 PM CDT   Consents   Consent for Immunization Exchange :   Consent Granted   Consent for Immunizations to Providers :   Consent Granted   Lexii Matos CMA - 3/30/2021 1:20 PM CDT   Meds / Allergies   (As Of: 3/30/2021 1:29:55 PM CDT)   Allergies (Active)   codeine  Estimated Onset Date:   Unspecified ; Reactions:   nausea ; Created By:   Luisa Ramirez MD; Reaction Status:   Active ; Category:   Drug ; Substance:   codeine ; Type:   Allergy ; Updated By:   Luisa Ramirez MD; Reviewed Date:   3/30/2021 1:23 PM CDT        Medication List   (As Of: 3/30/2021 1:29:55 PM CDT)   Prescription/Discharge Order    albuterol  :   albuterol ; Status:   Prescribed ; Ordered As Mnemonic:   albuterol 90 mcg/inh inhalation aerosol ; Simple Display Line:   2 puff(s), Inhale, q4 hrs, 3 EA, 3 Refill(s) ; Ordering Provider:   Aydin Yu MD; Catalog Code:   albuterol ; Order Dt/Tm:   6/9/2020 9:25:48 AM CDT          fluticasone-salmeterol  :   fluticasone-salmeterol ; Status:   Prescribed ; Ordered As Mnemonic:   AirDuo RespiClick 113 mcg-14 mcg/inh inhalation powder ;  Simple Display Line:   See Instructions, Inhale 1 puff by mouth bid;   rinse mouth and throat after use, 3 EA, 1 Refill(s) ; Ordering Provider:   Aydin Yu MD; Catalog Code:   fluticasone-salmeterol ; Order Dt/Tm:   2/9/2021 2:08:22 PM CST          indomethacin  :   indomethacin ; Status:   Prescribed ; Ordered As Mnemonic:   indomethacin 50 mg oral capsule ; Simple Display Line:   50 mg, 1 cap(s), Oral, tid, for 7 day(s), PRN: for arthritis, 30 cap(s), 1 Refill(s) ; Ordering Provider:   Janice Guillory PA-C; Catalog Code:   indomethacin ; Order Dt/Tm:   1/5/2020 8:55:37 AM CST          lisinopril  :   lisinopril ; Status:   Prescribed ; Ordered As Mnemonic:   lisinopril 40 mg oral tablet ; Simple Display Line:   1 tab(s), Oral, daily, 90 tab(s), 3 Refill(s) ; Ordering Provider:   Aydin Yu MD; Catalog Code:   lisinopril ; Order Dt/Tm:   6/9/2020 9:27:03 AM CDT          Miscellaneous Rx Supply  :   Miscellaneous Rx Supply ; Status:   Prescribed ; Ordered As Mnemonic:   CPAP 7 cm water pressure ; Simple Display Line:   See Instructions, Heated humidifier, heated tubing, filters, nasal or full face mask of choice with headgear.  Replacement cushions and supplies as needed., 1 EA, 11 Refill(s) ; Ordering Provider:   James FLORES [facility]; Catalog Code:   Miscellaneous Rx Supply ; Order Dt/Tm:   1/14/2019 8:55:38 AM CST          omeprazole  :   omeprazole ; Status:   Prescribed ; Ordered As Mnemonic:   omeprazole 40 mg oral delayed release capsule ; Simple Display Line:   1 cap(s), Oral, daily, 90 cap(s), 3 Refill(s) ; Ordering Provider:   Aydin Yu MD; Catalog Code:   omeprazole ; Order Dt/Tm:   6/9/2020 9:27:15 AM CDT          predniSONE  :   predniSONE ; Status:   Prescribed ; Ordered As Mnemonic:   predniSONE 10 mg oral tablet ; Simple Display Line:   See Instructions, 4 tabs daily for 4 days then 3 tabs daily for 4 days then 2 tabs daily for 4 days then 1 tab daily for 4 days  then 1/2 tab daily for 4 days, 42 tab(s), 0 Refill(s) ; Ordering Provider:   Aydin Yu MD; Catalog Code:   predniSONE ; Order Dt/Tm:   3/25/2021 2:19:07 PM CDT          rosuvastatin  :   rosuvastatin ; Status:   Prescribed ; Ordered As Mnemonic:   rosuvastatin 20 mg oral tablet ; Simple Display Line:   20 mg, 1 tab(s), Oral, daily, 90 tab(s), 3 Refill(s) ; Ordering Provider:   Aydin Yu MD; Catalog Code:   rosuvastatin ; Order Dt/Tm:   6/9/2020 9:27:34 AM CDT            Home Meds    aspirin  :   aspirin ; Status:   Documented ; Ordered As Mnemonic:   aspirin 81 mg oral tablet ; Simple Display Line:   81 mg, 1 tab(s), PO, Daily ; Catalog Code:   aspirin ; Order Dt/Tm:   1/7/2010 1:05:19 PM CST          EPINEPHrine  :   EPINEPHrine ; Status:   Documented ; Ordered As Mnemonic:   EPINEPHrine 0.3 mg injectable kit ; Simple Display Line:   PRN: for anaphylaxis, 0 Refill(s) ; Catalog Code:   EPINEPHrine ; Order Dt/Tm:   3/30/2021 1:26:24 PM CDT          triamcinolone topical  :   triamcinolone topical ; Status:   Documented ; Ordered As Mnemonic:   triamcinolone 0.1% topical cream ; Simple Display Line:   PRN: for itching, 0 Refill(s) ; Catalog Code:   triamcinolone topical ; Order Dt/Tm:   3/30/2021 1:26:24 PM CDT            ID Risk Screen   Recent Travel History :   No recent travel   Family Member Travel History :   No recent travel   Other Exposure to Infectious Disease :   Unknown   COVID-19 Testing Status :   No COVID-19 test performed   Lexii Matos CMA 3/30/2021 1:20 PM CDT

## 2022-02-16 NOTE — NURSING NOTE
Comprehensive Intake Entered On:  6/2/2020 10:49 AM CDT    Performed On:  6/2/2020 10:48 AM CDT by Schoenike , Andrea               Summary   Chief Complaint :   Lab draw and vitals   Menstrual Status :   N/A   Weight Measured :   191.6 lb(Converted to: 191 lb 10 oz, 86.91 kg)    Height Measured :   66 in(Converted to: 5 ft 6 in, 167.64 cm)    Body Mass Index :   30.92 kg/m2 (HI)    Body Surface Area :   2.01 m2   Systolic Blood Pressure :   133 mmHg   Diastolic Blood Pressure :   92 mmHg (HI)    Mean Arterial Pressure :   106 mmHg   Peripheral Pulse Rate :   76 bpm   BP Site :   Left arm   Pulse Site :   Radial artery   BP Method :   Electronic   HR Method :   Electronic   Oxygen Saturation :   97 %   Schoenike , Andrea - 6/2/2020 10:48 AM CDT   ID Risk Screen   Recent Travel History :   No recent travel   Family Member Travel History :   No recent travel   Other Exposure to Infectious Disease :   Unknown   Schoenike , Andrea - 6/2/2020 10:48 AM CDT

## 2022-02-16 NOTE — TELEPHONE ENCOUNTER
Order, demographics, and notes faxed to [facility], they will contact patient to schedule.

## 2022-02-16 NOTE — PROGRESS NOTES
Patient:   CANDICE FREY JR            MRN: [id number]            FIN: [id number]               Age:   59 years     Sex:  Male     :  1960   Associated Diagnoses:   Pneumonia   Author:   Aydin Yu MD      Visit Information      Date of Service: 10/15/2019 03:08 pm  Performing Location: [facility]  Encounter#: [id number]      Primary Care Provider (PCP):  Aydin Yu MD    NPI# [id number]      Referring Provider:  Aydin Yu MD    NPI# [id number]      Chief Complaint   10/15/2019 3:23 PM CDT   Follow up Pneumonia, still has dry cough      History of Present Illness   Candice is a 60y/o M who presents for follow up of pneumonia treated with Levoquin. Completed levoquin a week ago. Notes overall feeling better but continued dry cough and SOB with activity. No fever/chills, chest pain, abdominal pain, bloody sputum. Some mild nasal congestion over the past 2 days. Has been using albuterol inhaler which helps but needs refills.       Review of Systems   Constitutional:  Negative.    Eye:  Negative.    Ear/Nose/Mouth/Throat:  Nasal congestion, No ear pain, No sore throat.    Respiratory:  Shortness of breath, Cough, No sputum production, No hemoptysis, No wheezing.    Cardiovascular:  Negative.    Gastrointestinal:  Negative.    Genitourinary:  Negative.    Musculoskeletal:  Negative.    Integumentary:  Negative.    Neurologic:  Negative.    Psychiatric:  Negative.              Health Status   Allergies:    Allergic Reactions (Selected)  Severity Not Documented  Codeine (Nausea)   Problem list:    All Problems  Allergic Rhinitis / ICD-9-.9 / Confirmed  Shift work sleep disorder / SNOMED CT 872899200 / Confirmed  Essential familial hyperlipidemia / ICD-9-.2 / Confirmed  GERD (Gastroesophageal Reflux Disease) / ICD-9-.81 / Confirmed  HTN (Hypertension) / ICD-9-.9 / Confirmed  Mixed hyperlipidemia / SNOMED CT 618141570 / Confirmed  Obesity / SNOMED CT  5231145886 / Probable  Severe obstructive sleep apnea / SNOMED CT 164093758 / Confirmed  Resolved: Appendectomy / SNOMED CT 899340234  Resolved: Bowel obstruction / SNOMED CT 117352699   Medications:  (Selected)   Prescriptions  Prescribed  CPAP 7 cm water pressure: CPAP 7 cm water pressure, See Instructions, Instructions: Heated humidifier, heated tubing, filters, nasal or full face mask of choice with headgear.  Replacement cushions and supplies as needed., Supply, # 1 EA, 11 Refill(s), Type: Maintenance  albuterol 90 mcg/inh inhalation aerosol: 2 puff(s), Inhale, q4 hrs, # 17 gm, 5 Refill(s), Type: Maintenance, Pharmacy: [id number], 2 puff(s) Inhale q4 hrs  lisinopril 40 mg oral tablet: = 1 tab(s) ( 40 mg ), po, daily, # 90 tab(s), 3 Refill(s), Type: Maintenance, Pharmacy: [place] [id number] [facility], hold on file - will contact when refills are needed, 1 tab(s) Oral daily  omega-3 polyunsaturated fatty acids 1000 mg oral capsule: = 2 cap(s) ( 2,000 mg ), Oral, bid, # 360 cap(s), 1 Refill(s), Type: Maintenance, [id number], 2 cap(s) Oral bid  omeprazole 40 mg oral delayed release capsule: = 1 cap(s), Oral, daily, # 90 cap(s), 1 Refill(s), Type: Maintenance, Pharmacy: [id number]  Documented Medications  Documented  aspirin 81 mg oral tablet: 1 tab(s) ( 81 mg ), PO, Daily, 0,    Medications          *denotes recorded medication          CPAP 7 cm water pressure: See Instructions, Heated humidifier, heated tubing, filters, nasal or full face mask of choice with headgear.  Replacement cushions and supplies as needed., 1 EA, 11 Refill(s).          albuterol 90 mcg/inh inhalation aerosol: 2 puff(s), Inhale, q4 hrs, 17 gm, 5 Refill(s).          *aspirin 81 mg oral tablet: 81 mg, 1 tab(s), PO, Daily.          lisinopril 40 mg oral tablet: 40 mg, 1 tab(s), po, daily, 90 tab(s), 3 Refill(s).          omega-3 polyunsaturated fatty acids 1000 mg oral capsule: 2,000 mg, 2 cap(s), Oral,  bid, 360 cap(s), 1 Refill(s).          omeprazole 40 mg oral delayed release capsule: 1 cap(s), Oral, daily, 90 cap(s), 1 Refill(s).          Histories   Past Medical History:    Resolved  Bowel obstruction (599633939): Onset in 1989 at 28 years.  Resolved.  Appendectomy ([id number]): Onset in 1986 at 25 years.  Resolved.   Family History:    Diabetes mellitus  Father  Hypercholesterolemia  Father     Procedure history:    Polysomnography ([id number]) on 3/7/2016 at 55 Years.  Comments:  3/23/2016 2:33 PM CDT - Serena Tristan CMA  AHI: 36.8  Colonoscopy ([id number]) on 10/30/2015 at 55 Years.  Comments:  11/17/2015 4:44 PM CST - Debra Liang RN  Sedation: Fentanyl and Midazolam  Indication: Personal history of colon polyps  Findings: Single tubular adenoma no high grade dysplasia. Diverticula in the sigmoid colon.  F/U: Repeat colonoscopy in 5 years  Colonoscopy ([id number]) on 4/16/2010 at 49 Years.  Comments:  4/30/2012 9:39 AM CDT - Slime Hummel MA  3 polyps removed, 2 were adenoma's  repeat in 5 years  Exploration of abdomen ([id number]) on 7/15/1993 at 32 Years.  Inguinal herniorrhaphy ([id number]) on 1/24/1992 at 31 Years.  Comments:  4/19/2016 7:02 AM Blanca Hughes  Laparoscopic left with mesh.  Appendectomy ([id number]) on 1/21/1986 at 25 Years.  Exploration of abdomen ([id number]) in the month of 2/1960 at 5 Months.  Comments:  4/19/2016 6:56 AM Blanca Hughes  Diverticulosis vs intussusception   Social History:        Alcohol Assessment            Current                     Comments:                      10/02/2012 - Lilly Graves LPN      Tobacco Assessment            Never      Substance Abuse Assessment            Never      Employment and Education Assessment            Employed, Work/School description: [employer]      Home and Environment Assessment            Marital status:  (Living together).  Spouse/Partner name: Liana.  Lives with  Children, Spouse.  4               children.  Living situation: Home/Independent.      Nutrition and Health Assessment            Type of diet: Regular.      Exercise and Physical Activity Assessment                     Comments:                      10/02/2012 - Ely COBURN, Lilly                     Active @ work, outdoors.      Sexual Assessment            Sexual orientation: Heterosexual.        Physical Examination   Vital Signs   10/15/2019 3:23 PM CDT Temperature Tympanic 98.7 DegF    Peripheral Pulse Rate 80 bpm    Pulse Site Radial artery    HR Method Manual    Systolic Blood Pressure 112 mmHg    Diastolic Blood Pressure 66 mmHg    Mean Arterial Pressure 81 mmHg    BP Site Right arm    BP Method Manual      Measurements from flowsheet : Measurements   10/15/2019 3:23 PM CDT Height Measured - Standard 66 in    Weight Measured - Standard 186.4 lb    BSA 1.98 m2    Body Mass Index 30.08 kg/m2  HI      General:  Alert and oriented, No acute distress.    Eye:  Pupils are equal, round and reactive to light, Extraocular movements are intact, Normal conjunctiva.    HENT:  Normocephalic, Oral mucosa is moist, No pharyngeal erythema.    Neck:  Supple, Non-tender.    Respiratory:  Lungs are clear to auscultation, Respirations are non-labored, Breath sounds are equal, Symmetrical chest wall expansion.         Pattern: Regular.    Cardiovascular:  Normal rate, Regular rhythm, No murmur, Normal peripheral perfusion, No edema.    Gastrointestinal:  Soft, Non-tender, Non-distended.    Musculoskeletal:  Normal range of motion, Normal strength, No tenderness, No swelling.    Integumentary:  Warm, Dry, No rash.    Neurologic:  Alert, Oriented, Normal sensory, Normal motor function, No focal deficits.    Psychiatric:  Cooperative, Appropriate mood & affect, Normal judgment.       Review / Management   Results review:  Lab results   9/29/2019 1:00 PM CDT WBC 10.2    RBC 5.06    Hgb 14.9 g/dL    Hct 42.5 %    MCV 84 fL    MCH  29.4 pg    MCHC 35.1 g/dL    RDW 13.6 %    Platelet 276    MPV 10.6 fL    Lymphs 13.2 %    Abs Lymphs 1.3    Neutrophils 71.6 %    Abs Neutrophils 7.3    Monocytes 12.2 %    Abs Monocytes 1.2    Eosinophils 2.6 %    Abs Eosinophils 0.3    Basophils 0.4 %    Abs Basophils 0.0    D-Dimer 0.54   9/25/2019 4:37 PM CDT Influenza A POC Negative    Influenza B POC Negative    POC Test Comments POC Test Comments   .       Impression and Plan   Diagnosis     Pneumonia (PYC94-LK J18.9).     Course:  Resolved.    Plan:  Patient is doing well as progressing as expected. Lungs are clear and expect patient to continue to improve over the next couple of weeks. Patient was counseled to continue to monitor symptoms and return if concerned. .    Patient Instructions:       Counseled: Patient, Regarding diagnosis, Regarding treatment, Verbalized understanding.       I was present with the medical student who participated in the service and in the documentation of the note. I have verified the history and personally performed the physical exam and medical decision making. I agree with the assessment and plan of care as documented in the note.   Medical Student Rene Sherwood      Professional Services   Counseling Summary:  This was a 15 minute visit with greater than 50% of that time spent counseling the patient.    Counseling included treatment options, risks and benefits, lifestyle changes, and prognosis.    The patient was interactive, attentive, asked questions, and verbalized understanding.

## 2022-02-16 NOTE — TELEPHONE ENCOUNTER
---------------------  From: Zoë Gracia CMA (Phone Messages Pool ([id number]_MARCELO Delarosa))   To: Aydin Yu MD;     Sent: 3/13/2020 8:04:25 AM CDT  Subject: Phone Message : Dulera     PCP:   CHT      Time of Call:  7:23am       Person Calling:  Andrew  Phone number:  [phone]  Leave a detailed Message:     Returned call at: 8:01am    Note:   Patient called, he states that when he last say [facility] in November for a Wheeze he had given him a sample inhaler of Dulera, patient states that it really works good and is requesting a refill of the inhaler to be sent to [facility].  Please advise    Last office visit and reason:  1/5/2020 [facility]     Pharmacy: [facility]    FWD to: T---------------------  From: Braxton Hunter PA-C   To: Phone Messages Pool ([id number] Delarosa);     Sent: 3/13/2020 10:04:47 AM CDT  Subject: RE: Phone Message : Dulera     I sent that in    [facility] Call    Time: 1:27pm    Note: Called to let patient know that the Dulera was sent into the pharmacy.  
Spine appears normal, range of motion is not limited, B/L difuse cervical paraspinal tenderness, also tender in occipital area of skull

## 2022-02-16 NOTE — NURSING NOTE
Comprehensive Intake Entered On:  6/9/2020 9:01 AM CDT    Performed On:  6/9/2020 8:55 AM CDT by Marita Hurtado LPN               Summary   Chief Complaint :   1) discuss recent lab results 2) medication refills: all pills get refilled at [facility] in [place] and the inhalers are at [facility] in [place] - verbal consent for telephone visit   Menstrual Status :   N/A   Height Measured :   66 in(Converted to: 5 ft 6 in, 167.64 cm)    Marita Hurtado LPN - 6/9/2020 8:55 AM CDT   Health Status   Allergies Verified? :   Yes   Medication History Verified? :   Yes   Immunizations Current :   Other: Pt. checking work for record.   Medical History Verified? :   No   Pre-Visit Planning Status :   Not completed   Tobacco Use? :   Never smoker   Marita Hurtado LPN - 6/9/2020 8:55 AM CDT   Meds / Allergies   (As Of: 6/9/2020 9:01:56 AM CDT)   Allergies (Active)   codeine  Estimated Onset Date:   Unspecified ; Reactions:   nausea ; Created By:   Luisa Ramirez MD; Reaction Status:   Active ; Category:   Drug ; Substance:   codeine ; Type:   Allergy ; Updated By:   Luisa Ramirez MD; Reviewed Date:   1/5/2020 8:43 AM CST        Medication List   (As Of: 6/9/2020 9:01:56 AM CDT)   Prescription/Discharge Order    indomethacin  :   indomethacin ; Status:   Prescribed ; Ordered As Mnemonic:   indomethacin 50 mg oral capsule ; Simple Display Line:   50 mg, 1 cap(s), Oral, tid, for 7 day(s), PRN: for arthritis, 30 cap(s), 1 Refill(s) ; Ordering Provider:   Janice Guillory PA-C; Catalog Code:   indomethacin ; Order Dt/Tm:   1/5/2020 8:55:37 AM CST          omeprazole  :   omeprazole ; Status:   Prescribed ; Ordered As Mnemonic:   omeprazole 40 mg oral delayed release capsule ; Simple Display Line:   1 cap(s), Oral, daily, 30 cap(s), 0 Refill(s) ; Ordering Provider:   Luisa Ramirez MD; Catalog Code:   omeprazole ; Order Dt/Tm:   4/28/2020 8:16:50 AM CDT          lisinopril  :   lisinopril ; Status:   Prescribed ;  Ordered As Mnemonic:   lisinopril 40 mg oral tablet ; Simple Display Line:   1 tab(s), Oral, daily, 30 tab(s), 0 Refill(s) ; Ordering Provider:   Luisa Ramirez MD; Catalog Code:   lisinopril ; Order Dt/Tm:   4/28/2020 8:16:36 AM CDT          omega-3 polyunsaturated fatty acids  :   omega-3 polyunsaturated fatty acids ; Status:   Prescribed ; Ordered As Mnemonic:   omega-3 polyunsaturated fatty acids 1000 mg oral capsule ; Simple Display Line:   2,000 mg, 2 cap(s), Oral, bid, 360 cap(s), 1 Refill(s) ; Ordering Provider:   Aydin Yu MD; Catalog Code:   omega-3 polyunsaturated fatty acids ; Order Dt/Tm:   8/9/2019 1:02:19 PM CDT          formoterol-mometasone  :   formoterol-mometasone ; Status:   Prescribed ; Ordered As Mnemonic:   Dulera 100 mcg-5 mcg/inh inhalation aerosol ; Simple Display Line:   1 puff(s), Inhale, bid, 13 gm, 5 Refill(s) ; Ordering Provider:   Braxton Hunter PA-C; Catalog Code:   formoterol-mometasone ; Order Dt/Tm:   3/13/2020 10:04:23 AM CDT          albuterol  :   albuterol ; Status:   Prescribed ; Ordered As Mnemonic:   albuterol 90 mcg/inh inhalation aerosol ; Simple Display Line:   2 puff(s), Inhale, q4 hrs, 17 gm, 5 Refill(s) ; Ordering Provider:   Aydin Yu MD; Catalog Code:   albuterol ; Order Dt/Tm:   10/15/2019 3:52:07 PM CDT          Miscellaneous Rx Supply  :   Miscellaneous Rx Supply ; Status:   Prescribed ; Ordered As Mnemonic:   CPAP 7 cm water pressure ; Simple Display Line:   See Instructions, Heated humidifier, heated tubing, filters, nasal or full face mask of choice with headgear.  Replacement cushions and supplies as needed., 1 EA, 11 Refill(s) ; Ordering Provider:   Luisa Ramirez MD; Catalog Code:   Miscellaneous Rx Supply ; Order Dt/Tm:   1/14/2019 8:55:38 AM CST            Home Meds    aspirin  :   aspirin ; Status:   Documented ; Ordered As Mnemonic:   aspirin 81 mg oral tablet ; Simple Display Line:   81 mg, 1 tab(s), PO, Daily ; Catalog  Code:   aspirin ; Order Dt/Tm:   1/7/2010 1:05:19 PM CST            ID Risk Screen   Recent Travel History :   No recent travel   Family Member Travel History :   No recent travel   Other Exposure to Infectious Disease :   Unknown   Marita Hurtado LPN - 6/9/2020 8:55 AM CDT

## 2022-02-16 NOTE — NURSING NOTE
Comprehensive Intake Entered On:  9/9/2021 2:23 PM CDT    Performed On:  9/9/2021 2:18 PM CDT by Courtney Parks CMA               Summary   Chief Complaint :   CPAP needs new supplies and change mask type.   Menstrual Status :   N/A   Weight Measured :   197 lb(Converted to: 197 lb 0 oz, 89.358 kg)    Height/Length Estimated :   66 in(Converted to: 5 ft 6 in, 167.64 cm)    Systolic Blood Pressure :   130 mmHg   Diastolic Blood Pressure :   76 mmHg   Mean Arterial Pressure :   94 mmHg   Peripheral Pulse Rate :   74 bpm   BP Site :   Right arm   Pulse Site :   Radial artery   BP Method :   Manual   HR Method :   Electronic   Temperature Tympanic :   97.3 DegF(Converted to: 36.3 DegC)  (LOW)    Oxygen Saturation :   99 %   Courtney Parks CMA - 9/9/2021 2:18 PM CDT   Health Status   Allergies Verified? :   Yes   Medication History Verified? :   Yes   Immunizations Current :   Other: Pt. checking work for record.   Medical History Verified? :   Yes   Courtney Parks CMA - 9/9/2021 2:18 PM CDT   Consents   Consent for Immunization Exchange :   Consent Granted   Consent for Immunizations to Providers :   Consent Granted   Courtney Parks CMA - 9/9/2021 2:18 PM CDT   Problems   (As Of: 9/9/2021 2:23:04 PM CDT)   Problems(Active)    Allergic Rhinitis (ICD-9-CM  :477.9 )  Name of Problem:   Allergic Rhinitis ; Recorder:   Jennifer Leon CMA; Confirmation:   Confirmed ; Classification:   Medical ; Code:   477.9 ; Contributor System:   PowerChart ; Last Updated:   2/28/2014 7:27 PM CST ; Life Cycle Date:   3/8/2010 ; Life Cycle Status:   Active ; Vocabulary:   ICD-9-CM        Essential familial hyperlipidemia (ICD-9-CM  :272.2 )  Name of Problem:   Essential familial hyperlipidemia ; Recorder:   Aydin Yu MD; Confirmation:   Confirmed ; Classification:   Medical ; Code:   272.2 ; Contributor System:   PowerChart ; Last Updated:   2/28/2014 7:27 PM CST ; Life Cycle Date:   10/2/2012 ; Life Cycle Status:   Active ;  Responsible Provider:   Aydin Yu MD; Vocabulary:   ICD-9-CM        GERD (Gastroesophageal Reflux Disease) (ICD-9-CM  :530.81 )  Name of Problem:   GERD (Gastroesophageal Reflux Disease) ; Recorder:   Jennifer Leon CMA; Confirmation:   Confirmed ; Classification:   Medical ; Code:   530.81 ; Contributor System:   PowerChart ; Last Updated:   2/28/2014 7:27 PM CST ; Life Cycle Date:   3/8/2010 ; Life Cycle Status:   Active ; Vocabulary:   ICD-9-CM        HTN (Hypertension) (ICD-9-CM  :401.9 )  Name of Problem:   HTN (Hypertension) ; Recorder:   Jennifer Leon CMA; Confirmation:   Confirmed ; Classification:   Medical ; Code:   401.9 ; Contributor System:   PowerChart ; Last Updated:   2/28/2014 7:27 PM CST ; Life Cycle Date:   3/8/2010 ; Life Cycle Status:   Active ; Vocabulary:   ICD-9-CM        Mixed hyperlipidemia (SNOMED CT  :273221788 )  Name of Problem:   Mixed hyperlipidemia ; Recorder:   Aydin Yu MD; Confirmation:   Confirmed ; Classification:   Medical ; Code:   273802695 ; Contributor System:   PowerChart ; Last Updated:   4/11/2017 11:04 AM CDT ; Life Cycle Status:   Active ; Responsible Provider:   Aydin Yu MD; Vocabulary:   SNOMED CT        Obesity (SNOMED CT  :0472044796 )  Name of Problem:   Obesity ; Recorder:   SYSTEM; Confirmation:   Probable ; Classification:   Medical ; Code:   8798312047 ; Last Updated:   4/11/2017 10:44 AM CDT ; Life Cycle Date:   4/11/2017 ; Life Cycle Status:   Active ; Vocabulary:   SNOMED CT        Severe obstructive sleep apnea (SNOMED CT  :019951951 )  Name of Problem:   Severe obstructive sleep apnea ; Onset Date:   3/7/2016 ; Recorder:   Luis Reid MD; Confirmation:   Confirmed ; Classification:   Medical ; Code:   999593184 ; Contributor System:   PowerChart ; Last Updated:   3/23/2016 8:41 AM CDT ; Life Cycle Date:   3/23/2016 ; Life Cycle Status:   Active ; Responsible Provider:   Luis Reid MD; Vocabulary:   SNOMED  CT   ; Comments:        3/23/2016 8:41 AM - Luis Reid MD  HST with AHI 36 and oximetry ankit 80% on 3/7/16    4/15/2016 9:32 AM - Luis Reid MD  Optimal CPAP titration to 7 cm water pressure on 4/1/2016      Shift work sleep disorder (SNOMED CT  :043047552 )  Name of Problem:   Shift work sleep disorder ; Recorder:   Luis Reid MD; Confirmation:   Confirmed ; Classification:   Medical ; Code:   222648643 ; Contributor System:   PowerChart ; Last Updated:   3/23/2016 8:45 AM CDT ; Life Cycle Date:   3/23/2016 ; Life Cycle Status:   Active ; Responsible Provider:   Luis Reid MD; Vocabulary:   SNOMED CT          Meds / Allergies   (As Of: 9/9/2021 2:23:05 PM CDT)   Allergies (Active)   codeine  Estimated Onset Date:   Unspecified ; Reactions:   nausea ; Created By:   Luisa Ramirez MD; Reaction Status:   Active ; Category:   Drug ; Substance:   codeine ; Type:   Allergy ; Updated By:   Luisa Ramirez MD; Reviewed Date:   9/9/2021 2:19 PM CDT      lisinopril  Estimated Onset Date:   Unspecified ; Reactions:   Hives ; Created By:   Aydin Yu MD; Reaction Status:   Active ; Category:   Drug ; Substance:   lisinopril ; Type:   Allergy ; Severity:   Severe ; Updated By:   Aydin Yu MD; Reviewed Date:   9/9/2021 2:19 PM CDT        Medication List   (As Of: 9/9/2021 2:23:05 PM CDT)   Prescription/Discharge Order    omeprazole  :   omeprazole ; Status:   Prescribed ; Ordered As Mnemonic:   omeprazole 40 mg oral delayed release capsule ; Simple Display Line:   1 cap(s), Oral, daily, 90 cap(s), 0 Refill(s) ; Ordering Provider:   Aydin Yu MD; Catalog Code:   omeprazole ; Order Dt/Tm:   7/31/2021 9:07:59 AM CDT          rosuvastatin  :   rosuvastatin ; Status:   Prescribed ; Ordered As Mnemonic:   rosuvastatin 20 mg oral tablet ; Simple Display Line:   1 tab(s), Oral, daily, 90 tab(s), 0 Refill(s) ; Ordering Provider:   Aydin Yu MD; Catalog Code:    rosuvastatin ; Order Dt/Tm:   7/19/2021 7:48:21 PM CDT          losartan  :   losartan ; Status:   Prescribed ; Ordered As Mnemonic:   losartan 100 mg oral tablet ; Simple Display Line:   100 mg, 1 tab(s), Oral, daily, 90 tab(s), 3 Refill(s) ; Ordering Provider:   Aydin Yu MD; Catalog Code:   losartan ; Order Dt/Tm:   3/30/2021 1:40:05 PM CDT          fluticasone-salmeterol  :   fluticasone-salmeterol ; Status:   Prescribed ; Ordered As Mnemonic:   AirDuo RespiClick 113 mcg-14 mcg/inh inhalation powder ; Simple Display Line:   See Instructions, Inhale 1 puff by mouth bid;   rinse mouth and throat after use, 3 EA, 1 Refill(s) ; Ordering Provider:   Aydin Yu MD; Catalog Code:   fluticasone-salmeterol ; Order Dt/Tm:   2/9/2021 2:08:22 PM CST          albuterol  :   albuterol ; Status:   Prescribed ; Ordered As Mnemonic:   albuterol 90 mcg/inh inhalation aerosol ; Simple Display Line:   2 puff(s), Inhale, q4 hrs, 3 EA, 3 Refill(s) ; Ordering Provider:   Aydin Yu MD; Catalog Code:   albuterol ; Order Dt/Tm:   6/9/2020 9:25:48 AM CDT          indomethacin  :   indomethacin ; Status:   Prescribed ; Ordered As Mnemonic:   indomethacin 50 mg oral capsule ; Simple Display Line:   50 mg, 1 cap(s), Oral, tid, for 7 day(s), PRN: for arthritis, 30 cap(s), 1 Refill(s) ; Ordering Provider:   Janice Guillory PA-C; Catalog Code:   indomethacin ; Order Dt/Tm:   1/5/2020 8:55:37 AM CST          Miscellaneous Rx Supply  :   Miscellaneous Rx Supply ; Status:   Prescribed ; Ordered As Mnemonic:   CPAP 7 cm water pressure ; Simple Display Line:   See Instructions, Heated humidifier, heated tubing, filters, nasal or full face mask of choice with headgear.  Replacement cushions and supplies as needed., 1 EA, 11 Refill(s) ; Ordering Provider:   Luisa Ramirez MD; Catalog Code:   Miscellaneous Rx Supply ; Order Dt/Tm:   1/14/2019 8:55:38 AM [facility]    EPINEPHrine  :   EPINEPHrine ;  Status:   Documented ; Ordered As Mnemonic:   EPINEPHrine 0.3 mg injectable kit ; Simple Display Line:   PRN: for anaphylaxis, 0 Refill(s) ; Catalog Code:   EPINEPHrine ; Order Dt/Tm:   3/30/2021 1:26:24 PM CDT          triamcinolone topical  :   triamcinolone topical ; Status:   Documented ; Ordered As Mnemonic:   triamcinolone 0.1% topical cream ; Simple Display Line:   PRN: for itching, 0 Refill(s) ; Catalog Code:   triamcinolone topical ; Order Dt/Tm:   3/30/2021 1:26:24 PM CDT          aspirin  :   aspirin ; Status:   Documented ; Ordered As Mnemonic:   aspirin 81 mg oral tablet ; Simple Display Line:   81 mg, 1 tab(s), PO, Daily ; Catalog Code:   aspirin ; Order Dt/Tm:   1/7/2010 1:05:19 PM CST            ID Risk Screen   Recent Travel History :   No recent travel   Family Member Travel History :   No recent travel   Other Exposure to Infectious Disease :   Unknown   COVID-19 Testing Status :   No positive COVID-19 test   Courtney Parks CMA - 9/9/2021 2:18 PM CDT   Social History   Social History   (As Of: 9/9/2021 2:23:05 PM CDT)   Alcohol:        Current   Comments:  10/2/2012 10:12 AM - Lilly Graves LPN: Rare   (Last Updated: 10/2/2012 10:12:11 AM CDT by Lilly Graves LPN)          Tobacco:        Never (less than 100 in lifetime)   (Last Updated: 12/21/2020 11:06:02 AM CST by Lotus Connell CMA)          Electronic Cigarette/Vaping:        Electronic Cigarette Use: Never.   (Last Updated: 12/21/2020 11:05:58 AM CST by Lotus Connell CMA)          Substance Abuse:        Never   (Last Updated: 10/2/2012 10:12:22 AM CDT by Lilly Graves LPN)          Employment/School:        Employed, Work/School description: [employer]   (Last Updated: 10/2/2012 10:12:43 AM CDT by Lilly Graves LPN)          Home/Environment:        Marital status:  (Living together).  Spouse/Partner name: Liana.  Lives with Children, Spouse.  4 children.  Living situation: Home/Independent.   (Last Updated:  10/2/2012 10:13:09 AM CDT by Lilly Graves LPN)          Nutrition/Health:        Type of diet: Regular.   (Last Updated: 10/2/2012 10:13:15 AM CDT by Lilly Graves LPN)          Exercise:         Comments:  10/2/2012 10:13 AM - Lilly Graves LPN: Active @ work, outdoors.   (Last Updated: 10/2/2012 10:13:36 AM CDT by Lilly Graves LPN)          Sexual:        Sexual orientation: Heterosexual.   (Last Updated: 10/2/2012 10:13:42 AM CDT by Lilly Graves LPN)

## 2022-02-16 NOTE — PROGRESS NOTES
Chief Complaint    c/o hives on neck, back, and scalp since yesterday, states he has had this in the past and was treated in the past with antibiotics and vit D3.  History of Present Illness       This is similar to what he had earlier this summer. He had seen the allergist who did some lab work and recommended up to 40 mg of Zyrtec daily. He has been taking this along with the vitamin d recommended but the highs have returned.  Review of Systems      Otherwise non contributory. He knows of no new changes.  Physical Exam   Vitals & Measurements    T: 98.4  F (Tympanic)  HR: 92 (Peripheral)  BP: 118/78     HT: 66 in  HT: 66 in  WT: 199.8 lb  BMI: 32.25       He has hives on the back of his neck.      Lungs clear       CV regular                    Assessment/Plan       Chronic urticaria (L50.8)          See below will see how he does on the prednisone.         Ordered:          predniSONE, = 2 tab(s) ( 40 mg ), PO, Daily, # 14 tab(s), 0 Refill(s), Type: Maintenance, Pharmacy: [id number], 2 tab(s) Oral daily,x7 day(s), 66, in, 10/05/21 14:00:00 CDT, Height Measured, 199.8, lb, 10/05/21 14:00:00 CDT, Weight Measured, (Ordered)          [address] office o/p est low 20-29 min (Charge), Quantity: 1, Chronic urticaria          CBC (h/h, RBC, indices, WBC, Plt)* ([facility]), Specimen Type: Blood, Collection Date: 10/05/21 14:14:00 CDT          Comprehensive Metabolic Panel* ([facility]), Specimen Type: Serum, Collection Date: 10/05/21 14:14:00 CDT          Sed rate by modified westergren* ([facility]), Specimen Type: Blood, Collection Date: 10/05/21 14:14:00 CDT          Tryptase* ([facility]), Specimen Type: Serum, Collection Date: 10/05/21 14:14:00 CDT          TSH* ([facility]), Specimen Type: Serum, Collection Date: 10/05/21 14:14:00 CDT          Vitamin D, 1,25 Dihydroxy LC/MS/MS* ([facility]), Specimen Type: Serum, Collection Date: 10/05/21 14:14:00 CDT           Patient Information     Name:CANDICE FREY JR      Address:        [address] [place], [place] [id number]     Sex:Male     YOB: 1960     [phone]     Emergency Contact:IRAIS FREY     MRN:[id number]     FIN:[id number]     Location:[facility]     Date of Service:10/05/2021      Primary Care Physician:       Aydin Yu MD, [phone]      Attending Physician:       Aydin Yu MD, [phone]  Problem List/Past Medical History    Ongoing     Allergic Rhinitis     Essential familial hyperlipidemia     GERD (Gastroesophageal Reflux Disease)     HTN (Hypertension)     Mixed hyperlipidemia     Obesity     Severe obstructive sleep apnea       Comments: Optimal CPAP titration to 7 cm water pressure on 4/1/2016 HST with AHI 36 and oximetry ankit 80% on 3/7/16     Shift work sleep disorder    Historical     Appendectomy     Bowel obstruction  Procedure/Surgical History     Polysomnography (03/07/2016)      Comments: AHI: 36.8.     Colonoscopy (10/30/2015)      Comments: Sedation: Fentanyl and Midazolam      Indication: Personal history of colon polyps      Findings: Single tubular adenoma no high grade dysplasia. Diverticula in the sigmoid colon.      F/U: Repeat colonoscopy in 5 years.     Colonoscopy (04/16/2010)      Comments: 3 polyps removed, 2 were adenoma's  repeat in 5 years.     Exploration of abdomen (07/15/1993)     Inguinal herniorrhaphy (01/24/1992)      Comments: Laparoscopic left with mesh..     Appendectomy (01/21/1986)     Exploration of abdomen (02/1960)      Comments: Diverticulosis vs intussusception.  Medications    AirDuo RespiClick 113 mcg-14 mcg/inh inhalation powder, See Instructions, 1 refills    albuterol 90 mcg/inh inhalation aerosol, 2 puff(s), Inhale, q4 hrs, 3 refills    aspirin 81 mg oral tablet, 81 mg= 1 tab(s), Oral, daily    CPAP 7 cm water pressure, See Instructions, 11 refills    EPINEPHrine 0.3 mg injectable kit, PRN    indomethacin 50 mg oral capsule, 50 mg= 1 cap(s), Oral, tid, PRN, 1 refills     losartan 100 mg oral tablet, 100 mg= 1 tab(s), Oral, daily, 3 refills    omeprazole 40 mg oral delayed release capsule, 1 cap(s), Oral, daily    predniSONE 20 mg oral tablet, 40 mg= 2 tab(s), Oral, daily    Replacement Auto Titrating CPAP 6-16 for daily use, See Instructions, 11 refills    rosuvastatin 20 mg oral tablet, 1 tab(s), Oral, daily    triamcinolone 0.1% topical cream, PRN    Vitamin D3, daily  Allergies    lisinopril (Hives)    codeine (nausea)  Social History    Smoking Status     Never smoker     Alcohol      Current     Electronic Cigarette/Vaping      Electronic Cigarette Use: Never.     Employment/School      Employed, Work/School description: [email or web address].     Exercise     Home/Environment      Marital status:  (Living together). Spouse/Partner name: Liana. Lives with Children, Spouse. 4 children. Living situation: Home/Independent.     Nutrition/Health      Type of diet: Regular.     Sexual      Sexual orientation: Heterosexual.     Substance Abuse      Never     Tobacco      Never (less than 100 in lifetime)  Family History    Diabetes mellitus: Father.    Hypercholesterolemia: Father.  Lab Results          Lab Results (Last 4 results within 90 days)           Sodium Level: 140 mmol/L [135 mmol/L - 146 mmol/L] (07/30/21 08:08:00)          Potassium Level: 4.3 mmol/L [3.5 mmol/L - 5.3 mmol/L] (07/30/21 08:08:00)          Chloride Level: 105 mmol/L [98 mmol/L - 110 mmol/L] (07/30/21 08:08:00)          CO2 Level: 28 mmol/L [20 mmol/L - 32 mmol/L] (07/30/21 08:08:00)          Glucose Level: 101 mg/dL High [65 mg/dL - 99 mg/dL] (07/30/21 08:08:00)          BUN: 17 mg/dL [7 mg/dL - 25 mg/dL] (07/30/21 08:08:00)          Creatinine Level: 0.96 mg/dL [0.7 mg/dL - 1.25 mg/dL] (07/30/21 08:08:00)          BUN/Creat Ratio: NOT APPLICABLE [6  - 22] (07/30/21 08:08:00)          eGFR: 85 mL/min/1.73m2 (07/30/21 08:08:00)          eGFR African American: 98 mL/min/1.73m2 (07/30/21 08:08:00)           Calcium Level: 10.1 mg/dL [8.6 mg/dL - 10.3 mg/dL] (07/30/21 08:08:00)          Bilirubin Total: 0.4 mg/dL [0.2 mg/dL - 1.2 mg/dL] (07/30/21 08:08:00)          Alkaline Phosphatase: 80 unit/L [35 unit/L - 144 unit/L] (07/30/21 08:08:00)          AST/SGOT: 26 unit/L [10 unit/L - 35 unit/L] (07/30/21 08:08:00)          ALT/SGPT: 41 unit/L [9 unit/L - 46 unit/L] (07/30/21 08:08:00)          Protein Total: 6.9 gm/dL [6.1 gm/dL - 8.1 gm/dL] (07/30/21 08:08:00)          Albumin Level: 4.7 gm/dL [3.6 gm/dL - 5.1 gm/dL] (07/30/21 08:08:00)          Globulin: 2.2 [1.9  - 3.7] (07/30/21 08:08:00)          A/G Ratio: 2.1 [1  - 2.5] (07/30/21 08:08:00)          Cholesterol: 179 mg/dL (07/30/21 08:08:00)          Non-HDL Cholesterol: 145 High (07/30/21 08:08:00)          HDL: 34 mg/dL Low (07/30/21 08:08:00)          Cholesterol/HDL Ratio: 5.3 High (07/30/21 08:08:00)          LDL: See comment (07/30/21 08:08:00)          LDL Direct: 79 mg/dL (07/30/21 08:08:00)          Triglyceride: 555 mg/dL High (07/30/21 08:08:00)  Immunizations          Scheduled Immunizations          Dose Date(s)          influenza virus vaccine, inactivated          11/01/2013, 11/07/2002, 11/19/2009          Other Immunizations          tetanus/diphth/pertuss (Tdap) adult/adol          02/04/2015

## 2022-02-16 NOTE — PROGRESS NOTES
Chief Complaint    f/u persistant cough. Difficulty sleeping. Saw [facility] 9/25- being treated with antibitoics.  History of Present Illness      Chief complaint as above reviewed and confirmed with patient.  Pt presents to the clinic with concerns re: cough and fever.  SEen 4 days ago on 9-25-19 for same, Negative influenza testing.  Treated for pneumonia clinically with augmentin.  Pt has not had any improvement, ongoing fevers shaking chills and night sweats, cough which is productive and keeps up at night. sob with coughing spells but not at rest, some anterior chest discomfort with cough. NO nausea/vomiting. no rash.  no edema, no orthopnea or pnd.  No current rhinorrhea, congestion.  Occasional wheezing.  No hx of asthma or allergies.  Review of Systems      Review of systems is negative with the exception of those noted in HPI          Physical Exam   Vitals & Measurements    T: 99.8   F (Tympanic)  HR: 93(Peripheral)  BP: 108/64  SpO2: 95%     HT: 66 in  WT: 185.2 lb  BMI: 29.89           Vitals as above per nursing documentation           Constitutional : nad appears well          Ears: ears patent B, TMS intact, noninjected           Nose: nasal mucosa is non-ededmatous. no discharge           Throat: pharynx is nonerythematous, no tonsillar hypertrophy, no exudate           Neck: neck supple, no adenopathy, no thyromegaly, no rigidity           Lungs: lungs decreased at bases bilaterally with scattered rhonchi but not obvious rales noted.           Heart: heart RRR, nl S1, S2 no murmur           skin:  No rashes            cbc: WNL      CXR: LLL infliltrate or atelectasis, small      D dimer normal range for age.  Assessment/Plan       1. LLL pneumonia (J18.1)         discussed with pt trial of albuterol and change of abx since he is still having fever after 4 days on the augmentin.  Pt would like that, given Levaquin as ordered. Discussed potenital side effects.  FU with pcp in 2 weeks, sooner if problems.   Should be fever free in about 72 hours, cough may persist for 2-3 weeks minimum.  pt relays understanding.                Cough (R05)         Ordered:          CBC w/ Diff/Plt (Request), Priority: Urgent, Fever  Cough          D-Dimer (Request), Priority: Urgent, Fever  Cough          XR Chest PA/Lat (Request), Fever  Cough                Fever (R50.9)         Ordered:          CBC w/ Diff/Plt (Request), Priority: Urgent, Fever  Cough          D-Dimer (Request), Priority: Urgent, Fever  Cough          XR Chest PA/Lat (Request), Fever  Cough                Orders:         albuterol, 2 puff(s), Inhale, q4 hrs, # 17 gm, 0 Refill(s), Type: Maintenance, Pharmacy: [facility], 2 puff(s) Inhale q4 hrs, (Ordered)         levoFLOXacin, = 1 tab(s) ( 750 mg ), Oral, q 24 hrs, x 7 day(s), # 7 tab(s), 0 Refill(s), Type: Acute, Pharmacy: [facility], 1 tab(s) Oral q 24 hrs,x7 day(s), (Ordered)  Patient Information     Name:CANDICE FREY JR      Address:      [address] [address]     Sex:Male     YOB: 1960     [phone]     Emergency Contact:[facility] EMERGENCY, CONTACT     MRN:[id number]     FIN:[id number]     Location:[facility]     Date of Service:09/29/2019      Primary Care Physician:       Aydin Yu MD, [phone]      Attending Physician:       Janice Guillory PA-C, [phone]  Problem List/Past Medical History    Ongoing     Allergic Rhinitis     Essential familial hyperlipidemia     GERD (Gastroesophageal Reflux Disease)     HTN (Hypertension)     Mixed hyperlipidemia     Obesity     Severe obstructive sleep apnea       Comments: Optimal CPAP titration to 7 cm water pressure on 4/1/2016 HST with AHI 36 and oximetry ankit 80% on 3/7/16     Shift work sleep disorder    Historical     Appendectomy     Bowel obstruction  Procedure/Surgical History     Polysomnography (03/07/2016)            Comments: AHI: 36.8.      Colonoscopy (10/30/2015)            Comments: Sedation: Fentanyl and Midazolam      Indication: Personal history of colon polyps      Findings: Single tubular adenoma no high grade dysplasia. Diverticula in the sigmoid colon.      F/U: Repeat colonoscopy in 5 years.     Colonoscopy (04/16/2010)            Comments: 3 polyps removed, 2 were adenoma's  repeat in 5 years.     Exploration of abdomen (07/15/1993)           Inguinal herniorrhaphy (01/24/1992)            Comments: Laparoscopic left with mesh..     Appendectomy (01/21/1986)           Exploration of abdomen (02.1960)            Comments: Diverticulosis vs intussusception.  Medications    albuterol 90 mcg/inh inhalation aerosol, 2 puff(s), Inhale, q4 hrs    amoxicillin-clavulanate 875 mg-125 mg oral tablet, 1 tab(s), Oral, q12 hrs    aspirin 81 mg oral tablet, 81 mg= 1 tab(s), Oral, daily    CPAP 7 cm water pressure, See Instructions, 11 refills    Levaquin 750 mg oral tablet, 750 mg= 1 tab(s), Oral, q 24 hrs    lisinopril 40 mg oral tablet, 40 mg= 1 tab(s), Oral, daily, 3 refills    omega-3 polyunsaturated fatty acids 1000 mg oral capsule, 2000 mg= 2 cap(s), Oral, bid, 1 refills    omeprazole 40 mg oral delayed release capsule, 1 cap(s), Oral, daily, 1 refills  Allergies    codeine (nausea)  Social History    Smoking Status - 09/29/2019     Never smoker     Alcohol      Current, 10/02/2012     Employment/School      Employed, Work/School description: [employer], 10/02/2012     Exercise     Home/Environment      Marital status:  (Living together). Spouse/Partner name: Liana. Lives with Children, Spouse. 4 children. Living situation: Home/Independent., 10/02/2012     Nutrition/Health      Type of diet: Regular., 10/02/2012     Sexual      Sexual orientation: Heterosexual., 10/02/2012     Substance Abuse      Never, 10/02/2012     Tobacco      Never, 10/02/2012  Family History    Diabetes mellitus: Father.    Hypercholesterolemia:  Father.  Immunizations      Vaccine Date Status Comments      tetanus/diphth/pertuss (Tdap) adult/adol 02/04/2015 Given      influenza virus vaccine, inactivated 11/01/2013 Recorded Michi Og  Lab Results       Lab Results (Last 4 results within 90 days)        WBC: 10.2 [4.5  - 11] (09/29/19 13:00:00)       RBC: 5.06 [4.3  - 5.9] (09/29/19 13:00:00)       Hgb: 14.9 [13.5 g/dL - 17.5 g/dL] (09/29/19 13:00:00)       Hct: 42.5 [37 % - 53 %] (09/29/19 13:00:00)       MCV: 84 [80 fL - 100 fL] (09/29/19 13:00:00)       MCH: 29.4 [26 pg - 34 pg] (09/29/19 13:00:00)       MCHC: 35.1 [32 g/dL - 36 g/dL] (09/29/19 13:00:00)       RDW: 13.6 [11.5 % - 15.5 %] (09/29/19 13:00:00)       Platelet: 276 [140  - 440] (09/29/19 13:00:00)       MPV: 10.6 [6.5 fL - 11 fL] (09/29/19 13:00:00)       Lymphocytes: 13.2 (09/29/19 13:00:00)       Abs Lymphocytes: 1.3 [0.9  - 2.9] (09/29/19 13:00:00)       Neutrophils: 71.6 (09/29/19 13:00:00)       Abs Neutrophils: 7.3 High [1.7  - 7] (09/29/19 13:00:00)       Monocytes: 12.2 (09/29/19 13:00:00)       Abs Monocytes: 1.2 High (09/29/19 13:00:00)       Eosinophils: 2.6 (09/29/19 13:00:00)       Abs Eosinophils: 0.3 (09/29/19 13:00:00)       Basophils: 0.4 (09/29/19 13:00:00)       Abs Basophils: 0.0 (09/29/19 13:00:00)       D-Dimer: 0.54 (09/29/19 13:00:00)       Influenza A POC: Negative (09/25/19 16:37:00)       Influenza B POC: Negative (09/25/19 16:37:00)       POC Test Comments: Lab Test Preformed by: [facility] [address] [place] [id number]: [phone]: [phone] (09/25/19 16:37:00)

## 2022-02-16 NOTE — PROGRESS NOTES
Chief Complaint    Concerns with on/off hives x 1 week. Does itch. No changes that can think of to cause. Taking benadryl which helps some with itching.  History of Present Illness       Patient is a 60-year-old male who comes in with concerns about a rash for the last week.       It started on his palms and appears like welts.  It comes and goes.  It is very itchy.  He has noticed it in his axilla, back of his neck, belt line, back, upper legs.  He is using Benadryl as needed and that has been helpful.       A month ago he had dental implants and started a new mouthwash at that time which he is continuing to use.  He used ibuprofen and pain medicines but has not used any of those for the last 2 weeks.       He has no change in his personal products including no new laundry soap or fabric softener.  No different shampoos or soaps or lotions.  No new deodorant.  No new clothes.  He will need to check with his wife and son to see if they have any new products.       He did eat a bunch of raspberries before the rash started.       He drives around to different clinics and so is not sure of any exposures there.  He does wear different masks at different locations.       He has a positive history of hives several years ago.       No fevers.       No chest pain or shortness of breath.       No difficulty swallowing or breathing.          Review of Systems       Negative except as listed in HPI          Physical Exam   Vitals & Measurements    T: 98.3  F (Tympanic)  HR: 80 (Peripheral)  BP: 116/80     WT: 195 lb        Vitals noted and within normal limits.       In general he is alert, oriented, and in no acute distress.       Mouth mucous membranes are pink and moist and pharynx is not erythematous.  There is no swelling in the throat.       Neck is supple with no cervical lymphadenopathy       Heart is a regular rate and rhythm with no murmurs       Lungs are clear to auscultation bilaterally       Skin shows an  erythematous macular papular rash which blanches.  No lesions on the palms.       Today lesions are on the back of the neck, top of the shoulders, axilla bilaterally.          Assessment/Plan       Hives (L50.9)          We will treat the hives with prednisone for a week.         Discussed the risks and benefits of prednisone.         He may continue Benadryl as needed.         Recommended doing a journal entry of all exposures 24 to 48 hours before the hives developed.         If he starts to have hives happening more frequently we can do a referral to an allergist for further investigation.         Patient verbalizes understanding.                   Ordered:          predniSONE, = 2 tab(s) ( 40 mg ), Oral, daily, x 7 day(s), # 14 tab(s), 0 Refill(s), Type: Acute, Pharmacy: [id number], 2 tab(s) Oral daily,x7 day(s), 66, in, 06/09/20 8:55:00 CDT, Height Measured, 195, lb, 12/21/20 11:05:00 CST, Weight Measured, (Ordered)           Patient Information     Name:CANDICE FREY JR      Address:      [address] [id number]     Sex:Male     YOB: 1960     [phone]     Emergency Contact:IRAIS FREY     MRN:[id number]     FIN:[id number]     Location:[facility]     Date of Service:12/21/2020      Primary Care Physician:       Aydin Yu MD, [phone]      Attending Physician:       Zully Mclaughlin MD, [phone]  Problem List/Past Medical History    Ongoing     Allergic Rhinitis     Essential familial hyperlipidemia     GERD (Gastroesophageal Reflux Disease)     HTN (Hypertension)     Mixed hyperlipidemia     Obesity     Severe obstructive sleep apnea       Comments: Optimal CPAP titration to 7 cm water pressure on 4/1/2016 HST with AHI 36 and oximetry ankit 80% on 3/7/16     Shift work sleep disorder    Historical     Appendectomy     Bowel obstruction  Procedure/Surgical History     Polysomnography (03/07/2016)             Comments: AHI: 36.8.     Colonoscopy (10/30/2015)            Comments: Sedation: Fentanyl and Midazolam      Indication: Personal history of colon polyps      Findings: Single tubular adenoma no high grade dysplasia. Diverticula in the sigmoid colon.      F/U: Repeat colonoscopy in 5 years.     Colonoscopy (04/16/2010)            Comments: 3 polyps removed, 2 were adenoma's  repeat in 5 years.     Exploration of abdomen (07/15/1993)           Inguinal herniorrhaphy (01/24/1992)            Comments: Laparoscopic left with mesh..     Appendectomy (01/21/1986)           Exploration of abdomen (02/1960)            Comments: Diverticulosis vs intussusception.  Medications    albuterol 90 mcg/inh inhalation aerosol, 2 puff(s), Inhale, q4 hrs, 3 refills    aspirin 81 mg oral tablet, 81 mg= 1 tab(s), Oral, daily    CPAP 7 cm water pressure, See Instructions, 11 refills    Dulera 100 mcg-5 mcg/inh inhalation aerosol, 1 puff(s), Inhale, bid, 3 refills    indomethacin 50 mg oral capsule, 50 mg= 1 cap(s), Oral, tid, PRN, 1 refills    lisinopril 40 mg oral tablet, 1 tab(s), Oral, daily, 3 refills    omeprazole 40 mg oral delayed release capsule, 1 cap(s), Oral, daily, 3 refills    predniSONE 20 mg oral tablet, 40 mg= 2 tab(s), Oral, daily    rosuvastatin 20 mg oral tablet, 20 mg= 1 tab(s), Oral, daily, 3 refills  Allergies    codeine (nausea)  Social History    Smoking Status     Never smoker     Alcohol      Current     Electronic Cigarette/Vaping      Electronic Cigarette Use: Never.     Employment/School      Employed, Work/School description: [employer].     Exercise     Home/Environment      Marital status:  (Living together). Spouse/Partner name: Liana. Lives with Children, Spouse. 4 children. Living situation: Home/Independent.     Nutrition/Health      Type of diet: Regular.     Sexual      Sexual orientation: Heterosexual.     Substance Abuse      Never     Tobacco      Never (less than 100 in  lifetime)  Family History    Diabetes mellitus: Father.    Hypercholesterolemia: Father.  Immunizations      Vaccine Date Status          tetanus/diphth/pertuss (Tdap) adult/adol 02/04/2015 Given          influenza virus vaccine, inactivated 11/01/2013 Recorded              Comments : Michi Og          influenza virus vaccine, inactivated 11/19/2009 Recorded          influenza virus vaccine, inactivated 11/07/2002 Recorded

## 2022-02-16 NOTE — NURSING NOTE
Comprehensive Intake Entered On:  1/14/2019 8:45 AM CST    Performed On:  1/14/2019 8:40 AM CST by Zoë Gracia CMA               Summary   Chief Complaint :   sore throat x1 week; worse at night; ears feel plugged; labs    Menstrual Status :   N/A   Weight Measured :   190.0 lb(Converted to: 190 lb 0 oz, 86.18 kg)    Height Measured :   66 in(Converted to: 5 ft 6 in, 167.64 cm)    Body Mass Index :   30.66 kg/m2 (HI)    Body Surface Area :   2 m2   Systolic Blood Pressure :   110 mmHg   Diastolic Blood Pressure :   60 mmHg   Mean Arterial Pressure :   77 mmHg   Peripheral Pulse Rate :   66 bpm   BP Site :   Left arm   Pulse Site :   Radial artery   BP Method :   Manual   HR Method :   Manual   Temperature Tympanic :   97.4 DegF(Converted to: 36.3 DegC)  (LOW)    Zoë Gracia CMA - 1/14/2019 8:40 AM CST   Health Status   Allergies Verified? :   Yes   Medication History Verified? :   Yes   Immunizations Current :   Other: Pt. checking work for record.   Medical History Verified? :   Yes   Pre-Visit Planning Status :   Completed   Tobacco Use? :   Never smoker   Zoë Gracia CMA - 1/14/2019 8:40 AM CST   Consents   Consent for Immunization Exchange :   Consent Granted   Consent for Immunizations to Providers :   Consent Granted   Zoë Gracia CMA - 1/14/2019 8:40 AM CST   Meds / Allergies   (As Of: 1/14/2019 8:45:23 AM CST)   Allergies (Active)   codeine  Estimated Onset Date:   Unspecified ; Reactions:   nausea ; Created By:   Luisa Ramirez MD; Reaction Status:   Active ; Category:   Drug ; Substance:   codeine ; Type:   Allergy ; Updated By:   Luisa Ramirez MD; Reviewed Date:   1/14/2019 8:43 AM CST        Medication List   (As Of: 1/14/2019 8:45:23 AM CST)   Prescription/Discharge Order    atorvastatin  :   atorvastatin ; Status:   Prescribed ; Ordered As Mnemonic:   atorvastatin 40 mg oral tablet ; Simple Display Line:   40 mg, 1 tab(s), po, daily, 90 tab(s), 3 Refill(s) ; Ordering Provider:   Gigi  Aydin FLORES; Catalog Code:   atorvastatin ; Order Dt/Tm:   5/23/2018 9:09:55 AM          azithromycin  :   azithromycin ; Status:   Processing ; Ordered As Mnemonic:   azithromycin 250 mg oral tablet ; Ordering Provider:   Aydin Yu MD; Action Display:   Complete ; Catalog Code:   azithromycin ; Order Dt/Tm:   1/14/2019 8:42:49 AM          lisinopril  :   lisinopril ; Status:   Prescribed ; Ordered As Mnemonic:   lisinopril 40 mg oral tablet ; Simple Display Line:   40 mg, 1 tab(s), po, daily, 90 tab(s), 3 Refill(s) ; Ordering Provider:   Aydin Yu MD; Catalog Code:   lisinopril ; Order Dt/Tm:   5/23/2018 9:09:43 AM          Miscellaneous Rx Supply  :   Miscellaneous Rx Supply ; Status:   Prescribed ; Ordered As Mnemonic:   CPAP 7 cm water pressure ; Simple Display Line:   See Instructions, Heated humidifier, heated tubing, filters, nasal or full face mask of choice with headgear.  Replacement cushions and supplies as needed., 1 EA, 11 Refill(s) ; Ordering Provider:   Luis Reid MD; Catalog Code:   Miscellaneous Rx Supply ; Order Dt/Tm:   4/25/2016 11:21:24 AM          omeprazole  :   omeprazole ; Status:   Prescribed ; Ordered As Mnemonic:   omeprazole 40 mg oral delayed release capsule ; Simple Display Line:   40 mg, 1 cap(s), po, daily, 90 cap(s), 3 Refill(s) ; Ordering Provider:   Aydin Yu MD; Catalog Code:   omeprazole ; Order Dt/Tm:   5/23/2018 9:10:05 AM            Home Meds    aspirin  :   aspirin ; Status:   Documented ; Ordered As Mnemonic:   aspirin 81 mg oral tablet ; Simple Display Line:   81 mg, 1 tab(s), PO, Daily ; Catalog Code:   aspirin ; Order Dt/Tm:   1/7/2010 1:05:19 PM          omega-3 polyunsaturated fatty acids  :   omega-3 polyunsaturated fatty acids ; Status:   Documented ; Ordered As Mnemonic:   Lovaza ; Simple Display Line:   2,000 mg, po, bid, 0 Refill(s) ; Catalog Code:   omega-3 polyunsaturated fatty acids ; Order Dt/Tm:   9/7/2017 1:15:04  PM

## 2022-02-16 NOTE — LETTER
(Inserted Image. Unable to display)     [address] [place] [address] [phone] (phone)  [phone] (fax)  CANDICE FREY     [address] [place], [place] [id number]        Dear CANDICE,    Thank you for selecting our practice as your care provider. Below you will find the results and recent diagnostic testings outcomes we have reviewed.        These are acceptable, please keep scheduled follow up appointments.      Result Name Current Result Previous Result Reference Range   Sodium Level (mmol/L)  138 3/29/2019  139 1/14/2019   137 5/10/2018 135 - 146   Potassium Level (mmol/L)  4.3 3/29/2019  5.0 1/14/2019   4.8 5/10/2018 3.5 - 5.3   Chloride Level (mmol/L)  101 3/29/2019  103 1/14/2019   103 5/10/2018 98 - 110   CO2 Level (mmol/L)  29 3/29/2019  30 1/14/2019   23 5/10/2018 20 - 32   Glucose Level (mg/dL)  90 3/29/2019  99 1/14/2019   98 5/10/2018 65 - 99   BUN (mg/dL)  12 3/29/2019  12 1/14/2019   20 5/10/2018 7 - 25   Creatinine Level (mg/dL)  0.96 3/29/2019  0.97 1/14/2019   1.03 5/10/2018 0.70 - 1.33   eGFR (mL/min/1.73m2)  86 3/29/2019  85 1/14/2019   80 5/10/2018 > OR = 60 -    Calcium Level (mg/dL)  9.8 3/29/2019  10.3 1/14/2019   9.6 5/10/2018 8.6 - 10.3   TSH (mIU/L)  1.91 3/29/2019  0.40 - 4.50   WBC  8.5 3/29/2019  3.8 - 10.8   RBC  5.38 3/29/2019  4.20 - 5.80   Hgb (gm/dL)  15.8 3/29/2019  13.2 - 17.1   Hct (%)  44.7 3/29/2019  38.5 - 50.0   MCV (fL)  83.1 3/29/2019  80.0 - 100.0   MCH (pg)  29.4 3/29/2019  27.0 - 33.0   MCHC (gm/dL)  35.3 3/29/2019  32.0 - 36.0   RDW (%)  14.0 3/29/2019  11.0 - 15.0   Platelet  265 3/29/2019  140 - 400   MPV (fL)  11.5 3/29/2019  7.5 - 12.5   Sed Rate  6 3/29/2019  14 7/25/2017  - < OR = 20       Please contact my practice at the number listed above if you have any questions or concerns.     Sincerely,        Aydin Yu MD, FAAFP

## 2022-02-16 NOTE — NURSING NOTE
Depression Screening Entered On:  9/9/2021 2:48 PM CDT    Performed On:  9/9/2021 2:47 PM CDT by Courtney Parks CMA               Depression Screening   Little Interest - Pleasure in Activities :   Not at all   Feeling Down, Depressed, Hopeless :   Not at all   Initial Depression Screen Score :   0 Score   Poor Appetite or Overeating :   Not at all   Trouble Falling or Staying Asleep :   Several days   Feeling Tired or Little Energy :   Several days   Feeling Bad About Yourself :   Not at all   Trouble Concentrating :   Not at all   Moving or Speaking Slowly :   Not at all   Thoughts Better Off Dead or Hurting Self :   Not at all   Difficulty at Work, Home, Getting Along :   Not difficult at all   Detailed Depression Screen Score :   2    Total Depression Screen Score :   2    Courtney Parks CMA - 9/9/2021 2:47 PM CDT

## 2022-02-16 NOTE — TELEPHONE ENCOUNTER
---------------------  From: Carolyn/Bernice GARCIA (Phone Messages Pool ([id number]))   To: T Message Pool ([address] [place] [address]);     Sent: 7/20/2021 1:01:57 PM CDT  Subject: General Message     Phone Message    PCP: [facility]    Time of Call: 1300    Phone Number: n/a    Returned call at: n/a    Note: Patient calls stating that he saw a doctor up at [facility] and she wanted him to get labs done. Patient wondering if he needs to be fasting. I advised  pt to call [facility] and ask them to fax us an order for the labs. Patient also wondering if [facility] wants any labs done as well when he comes in for his outside order labs. He would like to get them done all at once.     Please advise and let pt know.---------------------  From: Lexii Matos CMA (T Message Pool ([address] [place] [address]))   To: Aydin Yu MD; [address] Pool ([address] [place] [address]) ;     Sent: 7/20/2021 1:54:41 PM CDT  Subject: FW: General Message  ** Submitted: **  Order:Lipid panel with reflex to direct ldl* ([facility])  Details:  Specimen Type: Serum, *Est. Collection Date: 7/20/2021 due within 1 month(s), Future Order         Signed by Aydin Yu MD  7/20/2021 7:35:00 PM [facility]    ** Submitted: **  Order:Comprehensive Metabolic Panel* ([facility])  Details:  Specimen Type: Serum, *Est. Collection Date: 7/20/2021 due within 1 month(s), Future Order         Signed by Aydin Yu MD  7/20/2021 7:35:00 PM [facility]---------------------  From: Aydin Yu MD   To: T Message Pool ([id number]);     Sent: 7/20/2021 2:36:19 PM CDT  Subject: RE: General Message     Please add these to any lab he has drawn for specialist.We have no orders from [facility] for Dr. Bravo.  I called them at 1600 today asking for orders to be faxed.  Pt last saw Dr. Bravo 5/17/21. According to [facility] staff pt completed all labs at visit with Dr. Bravo and there are no other orders for labs to be done at this time.   Off floor per stretcher to Interventional Radiology.

## 2022-02-16 NOTE — NURSING NOTE
Comprehensive Intake Entered On:  12/21/2020 11:10 AM CST    Performed On:  12/21/2020 11:05 AM CST by Lotus Connell CMA               Summary   Chief Complaint :   Concerns with on/off hives x 1 week. Does itch. No changes that can think of to cause. Taking benadryl which helps some with itching.    Menstrual Status :   N/A   Weight Measured :   195 lb(Converted to: 195 lb 0 oz, 88.450 kg)    Systolic Blood Pressure :   116 mmHg   Diastolic Blood Pressure :   80 mmHg   Mean Arterial Pressure :   92 mmHg   Peripheral Pulse Rate :   80 bpm   BP Site :   Right arm   Pulse Site :   Radial artery   BP Method :   Manual   HR Method :   Manual   Temperature Tympanic :   98.3 DegF(Converted to: 36.8 DegC)    Lotus Connell CMA - 12/21/2020 11:05 AM CST   Health Status   Allergies Verified? :   Yes   Medication History Verified? :   Yes   Immunizations Current :   Other: Pt. checking work for record.   Pre-Visit Planning Status :   Not completed   Tobacco Use? :   Never smoker   Lotus Connell CMA - 12/21/2020 11:05 AM CST   Meds / Allergies   (As Of: 12/21/2020 11:10:33 AM CST)   Allergies (Active)   codeine  Estimated Onset Date:   Unspecified ; Reactions:   nausea ; Created By:   Luisa Ramirez MD; Reaction Status:   Active ; Category:   Drug ; Substance:   codeine ; Type:   Allergy ; Updated By:   Luisa Ramirez MD; Reviewed Date:   1/5/2020 8:43 AM CST        Medication List   (As Of: 12/21/2020 11:10:33 AM CST)   Prescription/Discharge Order    albuterol  :   albuterol ; Status:   Prescribed ; Ordered As Mnemonic:   albuterol 90 mcg/inh inhalation aerosol ; Simple Display Line:   2 puff(s), Inhale, q4 hrs, 3 EA, 3 Refill(s) ; Ordering Provider:   Aydin Yu MD; Catalog Code:   albuterol ; Order Dt/Tm:   6/9/2020 9:25:48 AM CDT          formoterol-mometasone  :   formoterol-mometasone ; Status:   Prescribed ; Ordered As Mnemonic:   Dulera 100 mcg-5 mcg/inh inhalation aerosol ; Simple Display Line:    1 puff(s), Inhale, bid, 3 EA, 3 Refill(s) ; Ordering Provider:   Aydin Yu MD; Catalog Code:   formoterol-mometasone ; Order Dt/Tm:   6/9/2020 9:25:49 AM CDT          indomethacin  :   indomethacin ; Status:   Prescribed ; Ordered As Mnemonic:   indomethacin 50 mg oral capsule ; Simple Display Line:   50 mg, 1 cap(s), Oral, tid, for 7 day(s), PRN: for arthritis, 30 cap(s), 1 Refill(s) ; Ordering Provider:   Janice Guillory PA-C; Catalog Code:   indomethacin ; Order Dt/Tm:   1/5/2020 8:55:37 AM CST          lisinopril  :   lisinopril ; Status:   Prescribed ; Ordered As Mnemonic:   lisinopril 40 mg oral tablet ; Simple Display Line:   1 tab(s), Oral, daily, 90 tab(s), 3 Refill(s) ; Ordering Provider:   Aydin Yu MD; Catalog Code:   lisinopril ; Order Dt/Tm:   6/9/2020 9:27:03 AM CDT          Miscellaneous Rx Supply  :   Miscellaneous Rx Supply ; Status:   Prescribed ; Ordered As Mnemonic:   CPAP 7 cm water pressure ; Simple Display Line:   See Instructions, Heated humidifier, heated tubing, filters, nasal or full face mask of choice with headgear.  Replacement cushions and supplies as needed., 1 EA, 11 Refill(s) ; Ordering Provider:   [facility]; Catalog Code:   Miscellaneous Rx Supply ; Order Dt/Tm:   1/14/2019 8:55:38 AM CST          omeprazole  :   omeprazole ; Status:   Prescribed ; Ordered As Mnemonic:   omeprazole 40 mg oral delayed release capsule ; Simple Display Line:   1 cap(s), Oral, daily, 90 cap(s), 3 Refill(s) ; Ordering Provider:   Aydin Yu MD; Catalog Code:   omeprazole ; Order Dt/Tm:   6/9/2020 9:27:15 AM CDT          rosuvastatin  :   rosuvastatin ; Status:   Prescribed ; Ordered As Mnemonic:   rosuvastatin 20 mg oral tablet ; Simple Display Line:   20 mg, 1 tab(s), Oral, daily, 90 tab(s), 3 Refill(s) ; Ordering Provider:   Aydin Yu MD; Catalog Code:   rosuvastatin ; Order Dt/Tm:   6/9/2020 9:27:34 AM CDT            Home Meds    aspirin   :   aspirin ; Status:   Documented ; Ordered As Mnemonic:   aspirin 81 mg oral tablet ; Simple Display Line:   81 mg, 1 tab(s), PO, Daily ; Catalog Code:   aspirin ; Order Dt/Tm:   1/7/2010 1:05:19 PM CST            ID Risk Screen   Recent Travel History :   No recent travel   Family Member Travel History :   No recent travel   Other Exposure to Infectious Disease :   Unknown   Lotus Connell CMA - 12/21/2020 11:05 AM CST   Social History   Social History   (As Of: 12/21/2020 11:10:33 AM CST)   Alcohol:        Current   Comments:  10/2/2012 10:12 AM - Lilly Graves LPN: Rare   (Last Updated: 10/2/2012 10:12:11 AM CDT by Lilly Graves LPN)          Tobacco:        Never (less than 100 in lifetime)   (Last Updated: 12/21/2020 11:06:02 AM CST by Lotus Connell CMA)          Electronic Cigarette/Vaping:        Electronic Cigarette Use: Never.   (Last Updated: 12/21/2020 11:05:58 AM CST by Lotus Connell CMA)          Substance Abuse:        Never   (Last Updated: 10/2/2012 10:12:22 AM CDT by Lilly Graves LPN)          Employment/School:        Employed, Work/School description: [employer]..   (Last Updated: 10/2/2012 10:12:43 AM CDT by Lilly Graves LPN)          Home/Environment:        Marital status:  (Living together).  Spouse/Partner name: Liana.  Lives with Children, Spouse.  4 children.  Living situation: Home/Independent.   (Last Updated: 10/2/2012 10:13:09 AM CDT by Lilly Graves LPN)          Nutrition/Health:        Type of diet: Regular.   (Last Updated: 10/2/2012 10:13:15 AM CDT by Lilly Graves LPN)          Exercise:         Comments:  10/2/2012 10:13 AM - Lilly Graves LPN: Active @ work, outdoors.   (Last Updated: 10/2/2012 10:13:36 AM CDT by Lilly Graves LPN)          Sexual:        Sexual orientation: Heterosexual.   (Last Updated: 10/2/2012 10:13:42 AM CDT by Uriel Graves LPN

## 2022-02-16 NOTE — PROGRESS NOTES
Patient:   CANDICE FREY JR            MRN: [id number]            FIN: [id number]               Age:   56 years     Sex:  Male     :  1960   Associated Diagnoses:   Mixed hyperlipidemia; Hypertension; GERD (Gastroesophageal Reflux Disease)   Author:   Aydin Yu MD      Visit Information      Date of Service: 2017 10:33 am  Performing Location: [facility]  Encounter#: [id number]      Primary Care Provider (PCP):  Aydin Yu MD    NPI# [id number]      Referring Provider:  No referring provider recorded for selected visit.      Chief Complaint   2017 10:40 AM CDT   HTN med check, f/u labs     Chief complaint and symptoms noted above confirmed with patient.      History of Present Illness             The patient presents for follow-up evaluation of hypertension.  The quality of hypertension symptom(s) since the patient's last visit is described as being unchanged.  The severity of the hypertension symptom(s) since the last visit is moderate.  Since the patient's last visit, the timing/course of hypertension symptom(s) is constant.  Exacerbating factors consist of none.  Relieving factors consist of medication.        Interval History   Cholesterol Management   Total cholesterol results See below.        Review of Systems   Constitutional:  Negative.    Ear/Nose/Mouth/Throat:  Negative.    Respiratory:  Negative.    Cardiovascular:  Negative except as documented in history of present illness.    Gastrointestinal:  Negative.    Genitourinary:  Negative.       Health Status   Allergies:    Allergic Reactions (Selected)  Severity Not Documented  Codeine (Nausea)   Medications:  (Selected)   Prescriptions  Prescribed  CPAP 10 cm water pressure: CPAP 10 cm water pressure, See Instructions, Instructions: Heated humidifier, heated tubing, filters, nasal or full face mask of choice with headgear.  Replacement cushions and supplies as needed., Supply, # 1 EA, 11 Refill(s),  Type: Maintenance  CPAP 7 cm water pressure: CPAP 7 cm water pressure, See Instructions, Instructions: Heated humidifier, heated tubing, filters, nasal or full face mask of choice with headgear.  Replacement cushions and supplies as needed., Supply, # 1 EA, 11 Refill(s), Type: Maintenance, Heate...  atorvastatin 40 mg oral tablet: 1 tab(s) ( 40 mg ), PO, Daily, # 90 tab(s), 0 Refill(s), Type: Maintenance, Pharmacy: [facility] [id number] [facility], 1 tab(s) po daily  lisinopril 40 mg oral tablet: 1 tab(s) ( 40 mg ), po, daily, # 90 tab(s), 0 Refill(s), Type: Maintenance, Pharmacy: [facility] [id number] [facility], 1 tab(s) po daily  omeprazole 40 mg oral delayed release capsule: 1 cap(s) ( 40 mg ), po, daily, # 90 cap(s), 0 Refill(s), Type: Maintenance, Pharmacy: [facility] [id number] [facility], 1 cap(s) po daily  Documented Medications  Documented  aspirin 81 mg oral tablet: 1 tab(s) ( 81 mg ), PO, Daily, 0   Problem list:    All Problems (Selected)  Allergic Rhinitis / ICD-9-.9 / Confirmed  Shift work sleep disorder / SNOMED CT 410350184 / Confirmed  Essential familial hyperlipidemia / ICD-9-.2 / Confirmed  GERD (Gastroesophageal Reflux Disease) / ICD-9-.81 / Confirmed  HTN (Hypertension) / ICD-9-.9 / Confirmed  Mixed hyperlipidemia / SNOMED CT 969319476 / Confirmed  Obesity / SNOMED CT 3169724857 / Probable  Severe obstructive sleep apnea / SNOMED CT 975459542 / Confirmed      Histories   Past Medical History:    Resolved  Bowel obstruction (SNOMED CT 575787181): Onset in 1989 at 28 years.  Resolved.  Appendectomy (SNOMED CT 537517445): Onset in 1986 at 25 years.  Resolved.   Family History:    Diabetes mellitus  Father  Hypercholesterolemia  Father     Procedure history:    Polysomnography (SNOMED CT 479325481) on 3/7/2016 at 55 Years.  Comments:  3/23/2016 2:33 PM - Serena Tristan CMA  AHI: 36.8  Colonoscopy (SNOMED CT 814903638) performed by Stef Herring MD on 10/30/2015 at 55 Years.  Comments:  11/17/2015 4:44 PM  - Debra Liang RN  Sedation: Fentanyl and Midazolam  Indication: Personal history of colon polyps  Findings: Single tubular adenoma no high grade dysplasia. Diverticula in the sigmoid colon.  F/U: Repeat colonoscopy in 5 years  Colonoscopy (SNOMED CT 535274857) on 4/16/2010 at 49 Years.  Comments:  4/30/2012 9:39 AM - Slime Hummel MA  3 polyps removed, 2 were adenoma's  repeat in 5 years  Exploration of abdomen (SNOMED CT 066997727) performed by Ricky Figueroa DO on 7/15/1993 at 32 Years.  Inguinal herniorrhaphy (SNOMED CT 06773078) performed by Be Galvez MD on 1/24/1992 at 31 Years.  Comments:  4/19/2016 7:02 AM - Blanca Reeves  Laparoscopic left with mesh.  Appendectomy (SNOMED CT 976958287) performed by Ramos Wilson MD on 1/21/1986 at 25 Years.  Exploration of abdomen (SNOMED CT 562044863) in the month of 2[birth year]1960 at 4 Months.  Comments:  4/19/2016 6:56 AM - Blanca Reeves  Diverticulosis vs intussusception   Social History:        Alcohol Assessment            Current                     Comments:                      10/02/2012 - Lilly Graves LPN                     Rare      Tobacco Assessment            Never      Substance Abuse Assessment            Never      Employment and Education Assessment            Employed, Work/School description: [employer]..      Home and Environment Assessment            Marital status:  (Living together).  Spouse/Partner name: Liana.  Lives with Children, Spouse.  4               children.  Living situation: Home/Independent.      Nutrition and Health Assessment            Type of diet: Regular.      Exercise and Physical Activity Assessment                     Comments:                      10/02/2012 - Lilly Graves LPN                     Active @ work, outdoors.      Sexual Assessment            Sexual orientation: Heterosexual.        Physical Examination   Vital Signs   4/11/2017 10:40 AM CDT Temperature Tympanic 98.1 DegF    Peripheral  Pulse Rate 68 bpm    Pulse Site Radial artery    HR Method Manual    Systolic Blood Pressure 120 mmHg    Diastolic Blood Pressure 80 mmHg    Mean Arterial Pressure 93 mmHg    BP Site Left arm    BP Method Manual      General:  Alert and oriented, No acute distress.    Eye:  Normal conjunctiva.    HENT:  Normocephalic.    Neck:  Supple, Non-tender.    Respiratory:  Lungs are clear to auscultation, Respirations are non-labored.    Cardiovascular:  Normal rate, Regular rhythm, No murmur.    Gastrointestinal:  Soft, Non-tender, Non-distended.       Review / Management   Results review:  Lab results   3/23/2017 1:00 PM CDT Sodium Level 140 mmol/L    Potassium Level 4.5 mmol/L    Chloride Level 105 mmol/L    CO2 Level 26 mmol/L    Glucose Level 87 mg/dL    BUN 21 mg/dL    Creatinine 1.04 mg/dL    BUN/Creat Ratio NOT APPLICABLE    eGFR 79 mL/min/1.73m2    eGFR African American 92 mL/min/1.73m2    Calcium Level 9.9 mg/dL    Cholesterol 235 mg/dL  HI    Non-  HI    HDL 42 mg/dL    Chol/HDL Ratio 5.6  HI    LDL See comment    LDL Direct 102 mg/dL    Triglyceride 631 mg/dL  HI   2/24/2017 10:30 AM CST Influenza A POC Negative    Influenza B POC Negative    POC Test Comments POC Test Comments   .       Impression and Plan   Diagnosis     Mixed hyperlipidemia (HWN15-QG E78.2).     Course:  Worsening.    Plan:       Diet: Calorie restricted, Low cholesterol, Avoid Etoh.    Orders     Orders (Selected)   Outpatient Orders  Ordered  Return to Clinic (Request): RFV: Lipid, CK, ESR, hepatic profile 1 week prior., Return in 2 months  Prescriptions  Prescribed  atorvastatin 40 mg oral tablet: 1 tab(s) ( 40 mg ), PO, Daily, # 90 tab(s), 0 Refill(s), Type: Maintenance, Pharmacy: [facility] [id number] [facility], 1 tab(s) po daily.     Diagnosis     Hypertension (CRC90-NM I10).     Course:  Worsening.    Patient Instructions:       Counseled: Patient, Diet, Activity, Verbalized understanding.    Orders     Orders (Selected)    Prescriptions  Prescribed  lisinopril 40 mg oral tablet: 1 tab(s) ( 40 mg ), po, daily, # 90 tab(s), 3 Refill(s), Type: Maintenance, Pharmacy: [facility], 1 tab(s) po daily.     Diagnosis     GERD (Gastroesophageal Reflux Disease) (NIC14-WB K21.9).     Course:  Progressing as expected.    Orders     Orders (Selected)   Prescriptions  Prescribed  omeprazole 40 mg oral delayed release capsule: 1 cap(s) ( 40 mg ), po, daily, # 90 cap(s), 3 Refill(s), Type: Maintenance, Pharmacy: [facility], 1 cap(s) po daily.

## 2022-02-16 NOTE — TELEPHONE ENCOUNTER
---------------------  From: Janice Guillory PA-C   To: Phone Messages Pool ([id number];     Sent: 11/6/2021 5:36:00 AM CDT  Subject: FW: Medication Management   Due Date/Time: 11/6/2021 10:36:00 AM CDT     got request for prednisone from patient. Please call to see what problem he is having, may need to be seen.  Has some chronic hives?  cough?        ------------------------------------------  From: [id number]  To: Janice Guillory PA-C  Sent: November 5, 2021 10:36:12 AM CDT  Subject: Medication Management  Due: October 21, 2021 8:18:07 PM CDT     ** On Hold Pending Signature **     Dispensed Drug: predniSONE (predniSONE 20 mg oral tablet), Take 2 tablets by mouth once daily for 5 days  Quantity: 10 tab(s)  Days Supply: 5  Refills: 0  Substitutions Allowed  Notes from Pharmacy:  ---------------------------------------------------------------  From: Lexii Matos CMA (Phone Messages Pool ([id number] [facility])   To: Aydin Yu MD;     Sent: 11/7/2021 8:16:22 AM CST  Subject: FW: Medication Management   Due Date/Time: 11/7/2021 10:36:00 AM CST---------------------  From: Aydin Yu MD   To: [id number]    Sent: 11/7/2021 11:19:22 AM CST  Subject: FW: Medication Management     ** Approved with modifications: **  predniSONE (predniSONE 20 MG Oral Tablet)  Take 2 tablets by mouth once daily for 5 days  Qty:  10 tab(s)        Days Supply:  5        Refills:  0          Substitutions Allowed     Route To Pharmacy - [id number]   Signed by Aydin Yu MD

## 2022-02-16 NOTE — TELEPHONE ENCOUNTER
---------------------  From: Sangita Ortega RN (Phone Messages Pool ([id number]_[facility]))   To: Travis Alas;     Sent: 2/3/2021 10:43:42 AM CST  Subject: General Message     Patient is requesting copy of his COVID result mailed to him.  Are you able to do this or provide a copy and I can get it mailed out to him?    Thanks!

## 2022-02-16 NOTE — TELEPHONE ENCOUNTER
Entered by Aydin Yu MD on July 31, 2021 9:08:04 AM CDT  ---------------------  From: Aydin Yu MD   To: [facility] #[id number]    Sent: 7/31/2021 9:08:04 AM CDT  Subject: Medication Management     ** Submitted: **  Complete:omeprazole (omeprazole 40 mg oral delayed release capsule)   Signed by Aydin Yu MD  7/31/2021 2:08:00 PM [facility]    ** Approved **  omeprazole (OMEPRAZOLE 40MG CAPSULES)  TAKE 1 CAPSULE BY MOUTH DAILY  Qty:  90 cap(s)        Days Supply:  90        Refills:  0          Substitutions Allowed     Route To Pharmacy - [facility] #[id number]               ------------------------------------------  From: [facility] #[id number]  To: Aydin Yu MD  Sent: July 31, 2021 5:20:25 AM CDT  Subject: Medication Management  Due: July 29, 2021 11:31:51 AM CDT     ** On Hold Pending Signature **     Dispensed Drug: omeprazole (omeprazole 40 mg oral delayed release capsule), TAKE 1 CAPSULE BY MOUTH DAILY  Quantity: 90 cap(s)  Days Supply: 90  Refills: 0  Substitutions Allowed  Notes from Pharmacy:  ------------------------------------------

## 2022-02-16 NOTE — LETTER
(Inserted Image. Unable to display)   [address] [place]  [address]  [phone]    August 19, 2020      CANDICE FREY       [address], [place] [id number]        Dear CANDICE,    Thank you for selecting [facility] for your healthcare needs. Below you will find the result of your recent test(s) done at our clinic.     Your chemistries are fine. Your lipids remain elevated. Please keep regular visits with Dr. Yu.      Result Name Current Result Previous Result Reference Range   Sodium Level (mmol/L)  137 8/18/2020  139 6/2/2020 135 - 146   Potassium Level (mmol/L)  4.7 8/18/2020  4.6 6/2/2020 3.5 - 5.3   Chloride Level (mmol/L)  102 8/18/2020  104 6/2/2020 98 - 110   CO2 Level (mmol/L)  28 8/18/2020  23 6/2/2020 20 - 32   Glucose Level (mg/dL)  95 8/18/2020 ((H)) 104 6/2/2020 65 - 99   BUN (mg/dL)  19 8/18/2020  22 6/2/2020 7 - 25   Creatinine Level (mg/dL)  1.10 8/18/2020  1.16 6/2/2020 0.70 - 1.25   Calcium Level (mg/dL)  10.0 8/18/2020  10.3 6/2/2020 8.6 - 10.3   Bilirubin Total (mg/dL)  0.7 8/18/2020  0.2 - 1.2   Alkaline Phosphatase (unit/L)  67 8/18/2020  35 - 144   AST/SGOT (unit/L)  28 8/18/2020  10 - 35   ALT/SGPT (unit/L)  40 8/18/2020  9 - 46   Protein Total (gm/dL)  7.0 8/18/2020  6.1 - 8.1   Albumin Level (gm/dL)  4.8 8/18/2020  3.6 - 5.1   Globulin  2.2 8/18/2020  1.9 - 3.7   A/G Ratio  2.2 8/18/2020  1.0 - 2.5   Cholesterol (mg/dL) ((H)) 220 8/18/2020 ((H)) 315 6/2/2020  - <200   Non-HDL Cholesterol ((H)) 180 8/18/2020 ((H)) 276 6/2/2020  - <130   HDL (mg/dL)  40 8/18/2020 ((L)) 39 6/2/2020 > OR = 40 -    Cholesterol/HDL Ratio ((H)) 5.5 8/18/2020 ((H)) 8.1 6/2/2020  - <5.0   LDL Direct (mg/dL) ((H)) 108 8/18/2020 ((H)) 172 6/2/2020  - <100   Triglyceride (mg/dL) ((H)) 433 8/18/2020 ((H)) 626 6/2/2020  - <150       Please contact my practice at [phone] if you have any questions or concerns.      Sincerely,        Aydin Yu MD ,   Shoshana  MD James,   Braxton Hunter PA-C

## 2022-02-16 NOTE — PROGRESS NOTES
Chief Complaint    Recurring hives.  Last night, had episode of swollen lips and face.  History of Present Illness       Patient is a 60-year-old male who comes in with worsening hives and new face swelling.       He has recently having trouble with hives and he is not sure why.       On March 15, 2012 days ago, he was placed on 7 days of prednisone for the hives.  It worked well but then on day 8 his symptoms returned.  He is getting welts on his hands arms back and then also his legs and thighs.  No shortness of breath.       He called the clinic with the recurring symptoms and on Thursday, 2 days ago, he was started on a prednisone taper and told to take Zyrtec 10 mg daily.       Patient did that and was starting to get better and then last night he had face and lip swelling.  It was getting very bad overnight and so he took his third dose of prednisone 40 mg this morning at 3 AM.  That helped but he is now noticing that the welts are starting to return near his hands and his hands feel itchy.  He still has lip swelling and his voice feels scratchy.  No sore throat.  No shortness of breath.       Noticed he is on lisinopril and he reports being on this for 5 to 10 years without trouble.       Unsure of any new exposures or foods.       No fever or chills.  Review of Systems       Negative except as listed in HPI  Physical Exam   Vitals & Measurements    T: 97.9  F (Tympanic)  HR: 83 (Peripheral)  BP: 131/86  SpO2: 97%             Vitals noted and within normal limits including an O2 sat of 97%       In general he is alert, oriented, and in no acute distress.  No increased work of breathing.       Neck of supple no with no cervical lymphadenopathy       Face with noticeable lip swelling of the lower lip       Mouth mucous memories are pink and moist.  Difficult to tell if there is swelling in the mouth.       Patient's voice does sound a bit raspy       Heart has a regular rate and rhythm with no murmurs        Lungs are clear to auscultation bilaterally with good air entry and no wheezing       Skin with erythematous rash consistent with hives  Assessment/Plan       Hives (L50.9)       Lip swelling (R22.0)       Patient has hives which seems to be progressing to angioedema.       Concerning that it is getting worse and he is continuing to have lip swelling and voice changes.       Spoke with the emergency room physician at [facility] and they will see him there.       Patient prefers to go by private car and appears stable.       He has his sister and son with him and they will drive him up there.       Suggested that he try stopping his lisinopril and follow-up with his primary regarding which new blood pressure medicine to try.  Patient Information     Name:CANDICE FREY JR      Address:      [address] [id number]     Sex:Male     YOB: 1960     [phone]     Emergency Contact:IRAIS FREY     MRN:[id number]     FIN:[id number]     Location:[facility]     Date of Service:03/27/2021      Primary Care Physician:       Aydin Yu MD, [phone]      Attending Physician:       Zully Mclaughlin MD, [phone]  Problem List/Past Medical History    Ongoing     Allergic Rhinitis     Essential familial hyperlipidemia     GERD (Gastroesophageal Reflux Disease)     HTN (Hypertension)     Mixed hyperlipidemia     Obesity     Severe obstructive sleep apnea       Comments: Optimal CPAP titration to 7 cm water pressure on 4/1/2016 HST with AHI 36 and oximetry ankit 80% on 3/7/16     Shift work sleep disorder    Historical     Appendectomy     Bowel obstruction  Procedure/Surgical History     Polysomnography (03/07/2016)            Comments: AHI: 36.8.     Colonoscopy (10/30/2015)            Comments: Sedation: Fentanyl and Midazolam      Indication: Personal history of colon polyps      Findings: Single tubular adenoma no high grade dysplasia. Diverticula in the  sigmoid colon.      F/U: Repeat colonoscopy in 5 years.     Colonoscopy (04/16/2010)            Comments: 3 polyps removed, 2 were adenoma's  repeat in 5 years.     Exploration of abdomen (07/15/1993)           Inguinal herniorrhaphy (01/24/1992)            Comments: Laparoscopic left with mesh..     Appendectomy (01/21/1986)           Exploration of abdomen (02/1960)            Comments: Diverticulosis vs intussusception.  Medications    AirDuo RespiClick 113 mcg-14 mcg/inh inhalation powder, See Instructions, 1 refills    albuterol 90 mcg/inh inhalation aerosol, 2 puff(s), Inhale, q4 hrs, 3 refills    aspirin 81 mg oral tablet, 81 mg= 1 tab(s), Oral, daily    CPAP 7 cm water pressure, See Instructions, 11 refills    indomethacin 50 mg oral capsule, 50 mg= 1 cap(s), Oral, tid, PRN, 1 refills    lisinopril 40 mg oral tablet, 1 tab(s), Oral, daily, 3 refills    omeprazole 40 mg oral delayed release capsule, 1 cap(s), Oral, daily, 3 refills    predniSONE 10 mg oral tablet, See Instructions    rosuvastatin 20 mg oral tablet, 20 mg= 1 tab(s), Oral, daily, 3 refills  Allergies    codeine (nausea)  Social History    Smoking Status     Never smoker     Alcohol      Current     Electronic Cigarette/Vaping      Electronic Cigarette Use: Never.     Employment/School      Employed, Work/School description: [employer].     Exercise     Home/Environment      Marital status:  (Living together). Spouse/Partner name: Liana. Lives with Children, Spouse. 4 children. Living situation: Home/Independent.     Nutrition/Health      Type of diet: Regular.     Sexual      Sexual orientation: Heterosexual.     Substance Abuse      Never     Tobacco      Never (less than 100 in lifetime)  Family History    Diabetes mellitus: Father.    Hypercholesterolemia: Father.  Immunizations      Vaccine Date Status          tetanus/diphth/pertuss (Tdap) adult/adol 02/04/2015 Given          influenza virus vaccine, inactivated 11/01/2013  Recorded              Comments : Michi Og          influenza virus vaccine, inactivated 11/19/2009 Recorded          influenza virus vaccine, inactivated 11/07/2002 Recorded  Lab Results       Lab Results (Last 4 results within 90 days)        Coronavirus SARS-CoV-2 (COVID-19) TR: Negative (01/31/21 10:00:00)

## 2022-02-16 NOTE — LETTER
(Inserted Image. Unable to display)         January 07, 2020        CANDICE FREY   [address] [place], [place] [id number]        Dear CANDICE,    Thank you for selecting [facility] for your healthcare needs. Below you will find the results of your recent test(s) done at our clinic.      Your uric acid level was normal.  Please follow up with your regular doctor for persistent or recurrent symptoms.        Result Name Current Result Reference Range   Uric Acid (mg/dL)  8.0 1/5/2020 4.0 - 8.0       Please contact my practice at [phone] if you have any questions or concerns.     Sincerely,        Janice Guillory PA-C      What do your labs mean?  Below is a glossary of commonly ordered labs:  LDL - Bad Cholesterol  HDL- Good Cholesterol  AST/ALT- Liver Function  Cr/Creatinine - Kidney Function  Microalbumin - Kidney Function  BUN - Kidney Function  PSA - Prostate   TSH - Thyroid  HgbA1c - Diabetes Test  Hgb (Hemoglobin) - Red Blood Cells

## 2022-02-16 NOTE — TELEPHONE ENCOUNTER
Entered by Sofi Cisneros CMA on August 08, 2019 12:02:44 PM CDT  ---------------------  From: Sofi Cisneros CMA   To: [place] [address]    Sent: 8/8/2019 12:02:44 PM CDT  Subject: Medication Management     ** Submitted: **  Order:omeprazole (omeprazole 40 mg oral delayed release capsule)  1 cap(s)  Oral  daily  Qty:  90 cap(s)        Days Supply:  90        Refills:  1          Substitutions Allowed     Route To Pharmacy [place] [id number]    Signed by Sofi Cisneros CMA  8/8/2019 12:02:00 PM    ** Submitted: **  Complete:omeprazole (omeprazole 40 mg oral delayed release capsule)   Signed by Sofi Cisneros CMA  8/8/2019 12:02:00 PM    ** Not Approved:  **  omeprazole (OMEPRAZOLE DR 40MG  CAP)  TAKE 1 CAPSULE BY MOUTH ONCE DAILY  Qty:  90 cap(s)        Days Supply:  90        Refills:  0          Substitutions Allowed     Route To Pharmacy - [place] [id number]   Signed by Sofi Cisneros CMA            ** Patient matched by Sofi Cisneros CMA on 8/8/2019 12:01:59 PM CDT **      ------------------------------------------  From: [place] [address]  To: Aydin Yu MD  Sent: August 8, 2019 11:47:57 AM CDT  Subject: Medication Management  Due: August 9, 2019 11:47:57 AM CDT    ** On Hold Pending Signature **  Drug: omeprazole (omeprazole 40 mg oral delayed release capsule)  TAKE 1 CAPSULE BY MOUTH ONCE DAILY  Quantity: 90 cap(s)     Days Supply: 0         Refills: 0  Substitutions Allowed  Notes from Pharmacy:     Dispensed Drug: omeprazole (omeprazole 40 mg oral delayed release capsule)  TAKE 1 CAPSULE BY MOUTH ONCE DAILY  Quantity: 90 cap(s)     Days Supply: 90        Refills: 0  Substitutions Allowed  Notes from Pharmacy:   ------------------------------------------Resent to [facility] as it was originally sent to [facility]

## 2022-02-16 NOTE — TELEPHONE ENCOUNTER
Entered by Sofi Cisneros CMA on February 03, 2020 8:54:36 AM CST  ---------------------  From: Sofi Cisneros CMA   To: [email or web address] #[id number]    Sent: 2/3/2020 8:54:36 AM CST  Subject: Medication Management     ** Submitted: **  Order:omeprazole (omeprazole 40 mg oral delayed release capsule)  1 cap(s)  Oral  daily  Qty:  90 cap(s)        Refills:  0          Substitutions Allowed     Route To [facility] #[id number]    Signed by Sofi Cisneros CMA  2/3/2020 8:54:00 AM    ** Submitted: **  Complete:omeprazole (omeprazole 40 mg oral delayed release capsule)   Signed by Sofi Cisneros CMA  2/3/2020 8:54:00 AM    ** Not Approved:  **  omeprazole (OMEPRAZOLE 40MG CAPSULES)  TAKE 1 CAPSULE BY MOUTH EVERY DAILY  Qty:  360 cap(s)        Days Supply:  90        Refills:  0          Substitutions Allowed     Route To [email or web address] #[id number]   Signed by Sofi Cisneros CMA            ** Submitted: **  Order:lisinopril (lisinopril 40 mg oral tablet)  1 tab(s)  Oral  daily  Qty:  90 tab(s)        Refills:  0          Substitutions Allowed     Route To [email or web address] #[id number]    Signed by Sofi Cisneros CMA  2/3/2020 8:54:00 AM    ** Submitted: **  Complete:lisinopril (lisinopril 40 mg oral tablet)   Signed by Sofi Cisneros CMA  2/3/2020 8:54:00 AM    ** Not Approved:  **  lisinopril (LISINOPRIL 40MG TABLETS)  TAKE 1 TABLET BY MOUTH EVERY DAY  Qty:  90 tab(s)        Days Supply:  90        Refills:  0          Substitutions Allowed     Route To [facility] #[id number]   Signed by Sofi Cisneros CMA            ** Patient matched by Sofi Cisneros CMA on 2/3/2020 8:52:08 AM CST **      ------------------------------------------  From: [email or web address] #[id number]  To: Luisa Ramirez MD  Sent: January 31, 2020 5:12:54 PM CST  Subject: Medication Management  Due: February 1, 2020 5:12:54 PM CST    ** On Hold Pending Signature **  Drug: lisinopril (lisinopril 40 mg oral tablet)  TAKE  1 TABLET BY MOUTH EVERY DAY  Quantity: 90 tab(s)  Days Supply: 90  Refills: 0  Substitutions Allowed  Notes from Pharmacy:     Dispensed Drug: lisinopril (lisinopril 40 mg oral tablet)  TAKE 1 TABLET BY MOUTH EVERY DAY  Quantity: 90 tab(s)  Days Supply: 90  Refills: 0  Substitutions Allowed  Notes from Pharmacy:     ** On Hold Pending Signature **  Drug: omeprazole (omeprazole 40 mg oral delayed release capsule)  TAKE 1 CAPSULE BY MOUTH EVERY DAILY  Quantity: 360 cap(s)  Days Supply: 90  Refills: 0  Substitutions Allowed  Notes from Pharmacy:     Dispensed Drug: omeprazole (omeprazole 40 mg oral delayed release capsule)  TAKE 1 CAPSULE BY MOUTH EVERY DAILY  Quantity: 360 cap(s)  Days Supply: 90  Refills: 0  Substitutions Allowed  Notes from Pharmacy:   ------------------------------------------Med Refill    Date of last office visit and reason:  1/5/20      Date of last Med Check / Px:   1/14/19  Date of last labs pertaining to med:  1/5/20    RTC order in chart:  yes; due for px in April    For Protocol refill, has patient been contacted:  n/a

## 2022-02-16 NOTE — NURSING NOTE
Phone Message    PCP:   KIERAN      Time of Call:  11:19 am    Phone number:  [phone]    Returned call at: 12:00 pm    Note:   Pt called stating that he is out of his omega 3 medication and requesting a refill. This was on his med list but not prescribed. States that he had it filled at [facility] prior as he was not sure what dose he was taking. I did call them and confirmed that he was getting 1000 mg tablets 2 tabs BID. I filled this Rx and sent to [facility] as [facility] is his pharmacy of choice & is covered by his insurance.     [facility]    Last office visit and reason: 3/29/19    Transferred to: n/a

## 2022-02-16 NOTE — TELEPHONE ENCOUNTER
---------------------  From: Alicja Jack LPN (Phone Messages Pool ([id number]_[facility]))   To: Sleep Message Pool ([id number]_[place]);     Sent: 9/10/2021 12:10:24 PM CDT  Subject: CPAP information     Phone Message    PCP:   CHT       Time of Call:  11:55am       Person Calling:  Bobbi- [facility] [place]   Phone number:  [phone]    Note:   Bobbi calling needing to know the last time pt got a CPAP machine and supplies. She says pt is looking to transfer getting his supplies from them but she is needing this information.    Last office visit and reason:  9/9/21 YESENIA with ZIMPatient's information has been faxed to [facility] in [place].

## 2022-02-16 NOTE — PROGRESS NOTES
Chief Complaint    Cough and SOB x2 weeks. No exposure to covid  History of Present Illness      Today's visit was conducted via telephone due to the COVID-19 pandemic. PT consent to telephone.  visit was obtained and documented.  After telephone visit, pt seen by myself at the [facility] face to face for Covid 19 testing and further discussion of treatment plan.         Call Start Time:  1030      Call End Time:    10:45,      Provider location: clinic office       Pt location:  car in parking lot       Chief complaint as above reviewed and confirmed with patient.  Pt presents to the clinic with concerns re: cough/wheezing/SOB.  Sx present 2 weeks.  Minimal rhinorrhea, more nasal congestion.  No GI sx, loss of taste or smell.  No fever.  Taking Benadryl for ? allergies, not sure if it seems helpful.       Cough is dry mostly, wet and productive at times with small amount of sputum.  Worse at night but occurs day time as well.  Wheezing present.  Albuterol use 4x per day temporarily helpful.       Has been off Dulera for months, restarted when he got the bad cough. Was on the Dulara and albuterol following a bad respiratory infection in late 2019 with prolonged cough and wheezing episodes.  No previous diagnosis of asthma or COPD, no previous PFTS.  Review of Systems      Review of systems is negative with the exception of those noted in HPI          Physical Exam      nad appears well, able to talk in full sentences.       O2 sat at curbside 97%          Assessment/Plan       1. Reactive airway disease (J45.909)         short course of oral steroids, continue Dulera.  Pt wonders if there is a less expensive medication for him.       2. Cough (R05)         Test for covid 19.  conservative measures and isolation recommended until results return.  cover wtih Zithromax given duration of sx as well as sputum change.  FU with Dr. Yu for persistent or worsening sx.       Orders:         azithromycin, Take 2 tablets on  Day 1 and then 1 tablet, Oral, daily, Instructions: until finished, # 6 tab(s), 0 Refill(s), Type: Acute, Pharmacy: [id number], Take 2 tablets on Day 1 and then 1 tablet Oral daily,Instr:until finished, 66, in, 06/09/20 8:..., (Ordered)         predniSONE, = 2 tab(s) ( 40 mg ), Oral, daily, x 5 day(s), # 10 tab(s), 0 Refill(s), Type: Acute, Pharmacy: [id number], 2 tab(s) Oral daily,x5 day(s), 66, in, 06/09/20 8:55:00 CDT, Height Measured, 195, lb, 12/21/20 11:05:00 CST, Weight Measured, (Ordered)         SARS-CoV-2 RNA (COVID-19), Qualitative NAAT (Request), Contact with and (suspected) exposure to other viral communicable diseases  Patient Information     Name:CANDICE FREY JR      Address:      [address] [id number]     Sex:Male     YOB: 1960     [phone]     Emergency Contact:IRAIS FREY     MRN:[id number]     FIN:[id number]     Location:[facility]     Date of Service:01/31/2021      Primary Care Physician:       Aydin Yu MD, [phone]      Attending Physician:       Janice Guillory PA-C, [phone]  Problem List/Past Medical History    Ongoing     Allergic Rhinitis     Essential familial hyperlipidemia     GERD (Gastroesophageal Reflux Disease)     HTN (Hypertension)     Mixed hyperlipidemia     Obesity     Severe obstructive sleep apnea       Comments: Optimal CPAP titration to 7 cm water pressure on 4/1/2016 HST with AHI 36 and oximetry ankit 80% on 3/7/16     Shift work sleep disorder    Historical     Appendectomy     Bowel obstruction  Procedure/Surgical History     Polysomnography (03/07/2016)            Comments: AHI: 36.8.     Colonoscopy (10/30/2015)            Comments: Sedation: Fentanyl and Midazolam      Indication: Personal history of colon polyps      Findings: Single tubular adenoma no high grade dysplasia. Diverticula in the sigmoid colon.      F/U: Repeat colonoscopy in 5  years.     Colonoscopy (04/16/2010)            Comments: 3 polyps removed, 2 were adenoma's  repeat in 5 years.     Exploration of abdomen (07/15/1993)           Inguinal herniorrhaphy (01/24/1992)            Comments: Laparoscopic left with mesh..     Appendectomy (01/21/1986)           Exploration of abdomen (02/1960)            Comments: Diverticulosis vs intussusception.  Medications    albuterol 90 mcg/inh inhalation aerosol, 2 puff(s), Inhale, q4 hrs, 3 refills    aspirin 81 mg oral tablet, 81 mg= 1 tab(s), Oral, daily    CPAP 7 cm water pressure, See Instructions, 11 refills    Dulera 100 mcg-5 mcg/inh inhalation aerosol, 1 puff(s), Inhale, bid, 3 refills    indomethacin 50 mg oral capsule, 50 mg= 1 cap(s), Oral, tid, PRN, 1 refills    lisinopril 40 mg oral tablet, 1 tab(s), Oral, daily, 3 refills    omeprazole 40 mg oral delayed release capsule, 1 cap(s), Oral, daily, 3 refills    predniSONE 20 mg oral tablet, 40 mg= 2 tab(s), Oral, daily    rosuvastatin 20 mg oral tablet, 20 mg= 1 tab(s), Oral, daily, 3 refills    Zithromax Z-Migue 250 mg oral tablet, Take 2 tablets on Day 1 and then 1 tablet, Oral, daily  Allergies    codeine (nausea)  Social History    Smoking Status     Never smoker     Alcohol      Current     Electronic Cigarette/Vaping      Electronic Cigarette Use: Never.     Employment/School      Employed, Work/School description: [employer].     Exercise     Home/Environment      Marital status:  (Living together). Spouse/Partner name: Liana. Lives with Children, Spouse. 4 children. Living situation: Home/Independent.     Nutrition/Health      Type of diet: Regular.     Sexual      Sexual orientation: Heterosexual.     Substance Abuse      Never     Tobacco      Never (less than 100 in lifetime)  Family History    Diabetes mellitus: Father.    Hypercholesterolemia: Father.  Immunizations      Vaccine Date Status          tetanus/diphth/pertuss (Tdap) adult/adol 02/04/2015 Given           influenza virus vaccine, inactivated 11/01/2013 Recorded              Comments : Michi Og          influenza virus vaccine, inactivated 11/19/2009 Recorded          influenza virus vaccine, inactivated 11/07/2002 Recorded

## 2022-02-16 NOTE — LETTER
(Inserted Image. Unable to display)   April 29, 2019      CANDICE SHANTE   [address] [place], [place] [id number]        Dear CANDICE,      Thank you for selecting [facility] [place] [place] for your healthcare needs.     Our records indicate you are due for the following services:     Follow-up office visit     To schedule an appointment or if you have further questions, please contact your primary clinic:   Critical access hospital          [phone]   [facility]    [phone]             [facility]         [phone]      Powered by [employer]    Sincerely,    Aydin Yu M.D.

## 2022-02-16 NOTE — PROGRESS NOTES
Patient:   CANDICE FREY JR            MRN: [id number]            FIN: [id number]               Age:   59 years     Sex:  Male     :  1960   Associated Diagnoses:   Wheeze; HTN (Hypertension)   Author:   Aydin Yu MD      Visit Information      Date of Service: 2020 08:00 am  Performing Location: [facility]  Encounter#: [id number]      Primary Care Provider (PCP):  Aydin Yu MD    NPI# [id number]      Referring Provider:  Aydin Yu MD    NPI# [id number]   Visit type:  Telephone Encounter.    Source of history:  Patient.    Location of patient:  Home  Call Start Time:   920  Call End Time:    933      Chief Complaint   2020 8:55 AM CDT     1) discuss recent lab results 2) medication refills: all pills get refilled at [facility] in [place] and the inhalers are at [facility] in [place] - verbal consent for telephone visit     Doing well,       History of Present Illness   Today's visit was conducted via telephone due to the COVID-19 pandemic. Patient's consent to telephone visit was obtained and documented.      Reason for visit:  Lab check, lipids markedly worse.      Review of Systems   Constitutional:  Negative.    Respiratory:  Negative.    Cardiovascular:  Negative.       Impression and Plan   Diagnosis     Wheeze (TEP43-FO R06.2).     Course:  Progressing as expected, Unchanged.    Orders     Orders (Selected)   Prescriptions  Prescribed  Dulera 100 mcg-5 mcg/inh inhalation aerosol: 1 puff(s), Inhale, bid, # 3 EA, 3 Refill(s), Type: Maintenance, Pharmacy: [id number], 1 puff(s) Inhale bid, 66, in, 20 8:55:00 CDT, Height Measured, 191.6, lb, 20 10:48:00 CDT, Weight Measured  albuterol 90 mcg/inh inhalation aerosol: 2 puff(s), Inhale, q4 hrs, # 3 EA, 3 Refill(s), Type: Maintenance, Pharmacy: [facility], 2 puff(s) Inhale q4 hrs, 66, in, 20 8:55:00 CDT, Height Measured, 191.6, lb, 20 10:48:00 CDT, Weight  Measured.     Diagnosis     HTN (Hypertension) (GLE78-AB I10).     Wheeze (LFL84-QQ R06.2).     Course:  Worsening.    Orders     Orders (Selected)   Outpatient Orders  Ordered  Return to Clinic (Request): RFV: Lab, LIPID/Comp see orders., Return in 2 months  Ordered (Dispatched)  Comprehensive Metabolic Panel* ([facility]): Specimen Type: Serum, Collection Date: 06/09/20 9:29:00 CDT  Future (On Hold)  Lipid panel with reflex to direct ldl* ([facility]): Specimen Type: Serum, *Est. Collection Date: 08/09/20 +/- 14 day(s), Future Order  Prescriptions  Prescribed  lisinopril 40 mg oral tablet: = 1 tab(s), Oral, daily, # 90 tab(s), 3 Refill(s), Type: Maintenance, Pharmacy: [facility] #[id number], 1 tab(s) Oral daily, 66, in, 06/09/20 8:55:00 CDT, Height Measured, 191.6, lb, 06/02/20 10:48:00 CDT, Weight Measured  rosuvastatin 20 mg oral tablet: = 1 tab(s) ( 20 mg ), Oral, daily, # 90 tab(s), 3 Refill(s), Type: Maintenance, Pharmacy: [facility] #[id number], 1 tab(s) Oral daily, 66, in, 06/09/20 8:55:00 CDT, Height Measured, 191.6, lb, 06/02/20 10:48:00 CDT, Weight Measured.        Health Status   Allergies:    Allergic Reactions (Selected)  Severity Not Documented  Codeine (Nausea)   Medications:  (Selected)   Prescriptions  Prescribed  CPAP 7 cm water pressure: CPAP 7 cm water pressure, See Instructions, Instructions: Heated humidifier, heated tubing, filters, nasal or full face mask of choice with headgear.  Replacement cushions and supplies as needed., Supply, # 1 EA, 11 Refill(s), Type: Maintenance  Dulera 100 mcg-5 mcg/inh inhalation aerosol: 1 puff(s), Inhale, bid, # 3 EA, 3 Refill(s), Type: Maintenance, Pharmacy: [id number], 1 puff(s) Inhale bid, 66, in, 06/09/20 8:55:00 CDT, Height Measured, 191.6, lb, 06/02/20 10:48:00 CDT, Weight Measured  albuterol 90 mcg/inh inhalation aerosol: 2 puff(s), Inhale, q4 hrs, # 3 EA, 3 Refill(s), Type: Maintenance, Pharmacy: [id number], 2 puff(s) Inhale q4  hrs, 66, in, 06/09/20 8:55:00 CDT, Height Measured, 191.6, lb, 06/02/20 10:48:00 CDT, Weight Measured  indomethacin 50 mg oral capsule: = 1 cap(s) ( 50 mg ), Oral, tid, PRN: for arthritis, # 30 cap(s), 1 Refill(s), Type: Maintenance, Pharmacy: [id number], 1 cap(s) Oral tid,x7 day(s),PRN:for arthritis  lisinopril 40 mg oral tablet: = 1 tab(s), Oral, daily, # 90 tab(s), 3 Refill(s), Type: Maintenance, [id number], 1 tab(s) Oral daily, 66, in, 06/09/20 8:55:00 CDT, Height Measured, 191.6, lb, 06/02/20 10:48:00 CDT, Weight Measured  omeprazole 40 mg oral delayed release capsule: = 1 cap(s), Oral, daily, # 90 cap(s), 3 Refill(s), Type: Maintenance, [id number], 1 cap(s) Oral daily, 66, in, 06/09/20 8:55:00 CDT, Height Measured, 191.6, lb, 06/02/20 10:48:00 CDT, Weight Measured  rosuvastatin 20 mg oral tablet: = 1 tab(s) ( 20 mg ), Oral, daily, # 90 tab(s), 3 Refill(s), Type: Maintenance, [id number], 1 tab(s) Oral daily, 66, in, 06/09/20 8:55:00 CDT, Height Measured, 191.6, lb, 06/02/20 10:48:00 CDT, Weight Measured  Documented Medications  Documented  aspirin 81 mg oral tablet: 1 tab(s) ( 81 mg ), PO, Daily, 0   Problem list:    All Problems  Severe obstructive sleep apnea / SNOMED CT 828924643 / Confirmed  Obesity / SNOMED CT 3987132797 / Probable  Essential familial hyperlipidemia / ICD-9-.2 / Confirmed  Mixed hyperlipidemia / SNOMED CT 133345438 / Confirmed  HTN (Hypertension) / ICD-9-.9 / Confirmed  Shift work sleep disorder / SNOMED CT 113459346 / Confirmed  Allergic Rhinitis / ICD-9-.9 / Confirmed  GERD (Gastroesophageal Reflux Disease) / ICD-9-.81 / Confirmed      Histories   Past Medical History:    Resolved  Bowel obstruction (SNOMED CT 402024805): Onset in 1989 at 28 years.  Resolved.  Appendectomy (SNOMED CT 315673726): Onset in 1986 at 25 years.  Resolved.   Family History:    Diabetes  mellitus  Father  Hypercholesterolemia  Father     Procedure history:    Polysomnography (SNOMED CT 131126576) on 3/7/2016 at 55 Years.  Comments:  3/23/2016 2:33 PM CDT - Azalea DIAZ Serena  AHI: 36.8  Colonoscopy (SNOMED CT 368738113) performed by Stef Herring MD on 10/30/2015 at 55 Years.  Comments:  11/17/2015 4:44 PM CST - Debra Liang RN  Sedation: Fentanyl and Midazolam  Indication: Personal history of colon polyps  Findings: Single tubular adenoma no high grade dysplasia. Diverticula in the sigmoid colon.  F/U: Repeat colonoscopy in 5 years  Colonoscopy (SNOMED CT 616638000) on 4/16/2010 at 49 Years.  Comments:  4/30/2012 9:39 AM CDT - Slime Hummel MA  3 polyps removed, 2 were adenoma's  repeat in 5 years  Exploration of abdomen (SNOMED CT 577567653) performed by Ricky Figueroa DO on 7/15/1993 at 32 Years.  Inguinal herniorrhaphy (SNOMED CT 85152988) performed by Be Galvez MD on 1/24/1992 at 31 Years.  Comments:  4/19/2016 7:02 AM CDT - Blanca Reeves  Laparoscopic left with mesh.  Appendectomy (SNOMED CT 525504751) performed by Ramos Wilson MD on 1/21/1986 at 25 Years.  Exploration of abdomen (SNOMED CT 316616106) in the month of 2/1960 at 5 Months.  Comments:  4/19/2016 6:56 AM CDT - Blanca Reeves  Diverticulosis vs intussusception   Social History:        Alcohol Assessment            Current                     Comments:                      10/02/2012 - Lilly Graves LPN      Tobacco Assessment            Never      Substance Abuse Assessment            Never      Employment and Education Assessment            Employed, Work/School description: [employer]      Home and Environment Assessment            Marital status:  (Living together).  Spouse/Partner name: Liana.  Lives with Children, Spouse.  4               children.  Living situation: Home/Independent.      Nutrition and Health Assessment            Type of diet: Regular.       Exercise and Physical Activity Assessment                     Comments:                      10/02/2012 - Lilly Graves LPN                     Active @ work, outdoors.      Sexual Assessment            Sexual orientation: Heterosexual.        Physical Examination   Measurements from flowsheet : Measurements   6/9/2020 8:55 AM CDT     Height Measured - Standard                66 in        Review / Management   Results review:  Lab results   6/2/2020 10:47 AM CDT Sodium Level 139 mmol/L    Potassium Level 4.6 mmol/L    Chloride Level 104 mmol/L    CO2 Level 23 mmol/L    Glucose Level 104 mg/dL  HI    BUN 22 mg/dL    Creatinine 1.16 mg/dL    BUN/Creat Ratio NOT APPLICABLE    eGFR 68 mL/min/1.73m2    eGFR African American 79 mL/min/1.73m2    Calcium Level 10.3 mg/dL    Cholesterol 315 mg/dL  HI    Non-  HI    HDL 39 mg/dL  LOW    Chol/HDL Ratio 8.1  HI    LDL See comment    LDL Direct 172 mg/dL  HI    Triglyceride 626 mg/dL  HI    WBC 7.1    RBC 5.54    Hgb 16.0 gm/dL    Hct 47.2 %    MCV 85.2 fL    MCH 28.9 pg    MCHC 33.9 gm/dL    RDW 13.5 %    Platelet 280    MPV 11.1 fL   .

## 2022-02-16 NOTE — TELEPHONE ENCOUNTER
---------------------  From: Sofi Cisneros CMA (Phone Messages Pool ([id number]_[facility]))   To: Aydin Yu MD;     Sent: 10/29/2019 11:58:21 AM CDT  Subject: Phone Note: F/U Cough     Phone Message    PCP:   CHT      Time of Call:  11:00 am    Phone number:  [phone]    Returned call at: n/a    Note:   Pt was seen on 9/25/19 with [facility] and dx with Pneumonia and treated with Levaquin. He had finished the Rx and a week later was seen by [facility] on 10/15/19 for a f/u as he continued to have a dry cough and using his albuterol INH. His lungs were clear and improving. He is calling today worried as he still continues to cough a lot at night and during the day. Feels like he is wheezing as well. Continues to use the albuterol INH as well. Wondering if he should f/u in clinic again or does he need to be on a stronger INH? Please advise.     Pharmacy: [facility]    Last office visit and reason: 10/15/19; f/u pneumonia    Transferred to: T---------------------  From: Aydin Yu MD   To: Phone Messages Pool ([id number] [facility]);     Sent: 10/29/2019 12:43:05 PM CDT  Subject: RE: Phone Note: F/U Cough     I would like to see him and get another chest xrayReturned Call  Time: 1:00 pm  Note:  Called & spoke with patient. He agrees and willing to be seen. States that he is having a hard time sleeping at night and wanting this to clear up.

## 2022-02-16 NOTE — PROGRESS NOTES
Patient:   CANDICE FREY JR            MRN: [id number]            FIN: [id number]               Age:   56 years     Sex:  Male     :  1960   Associated Diagnoses:   Cold virus   Author:   Aydin Yu MD      Chief Complaint   3/2/2017 11:28 AM CST    Fill out paperwork        History of Present Illness             The patient presents with symptoms of an upper respiratory infection.  The symptoms of upper respiratory infection have lasted for 1 week(s).  Resolved.  Relieving factors consist of rest and fluids.        Review of Systems   Constitutional:  Fever.    Ear/Nose/Mouth/Throat:  Nasal congestion.    Respiratory:  Cough, No wheezing.       Health Status   Allergies:    Allergic Reactions (Selected)  Severity Not Documented  Codeine (Nausea)   Medications:  (Selected)   Prescriptions  Prescribed  CPAP 10 cm water pressure: CPAP 10 cm water pressure, See Instructions, Instructions: Heated humidifier, heated tubing, filters, nasal or full face mask of choice with headgear.  Replacement cushions and supplies as needed., Supply, # 1 EA, 11 Refill(s), Type: Maintenance  CPAP 7 cm water pressure: CPAP 7 cm water pressure, See Instructions, Instructions: Heated humidifier, heated tubing, filters, nasal or full face mask of choice with headgear.  Replacement cushions and supplies as needed., Supply, # 1 EA, 11 Refill(s), Type: Maintenance, Heate...  lisinopril 40 mg oral tablet: 1 tab(s) ( 40 mg ), po, daily, # 90 tab(s), 1 Refill(s), Type: Maintenance, Pharmacy: [id number], 1 tab(s) po daily  omeprazole 40 mg oral delayed release capsule: 1 cap(s) ( 40 mg ), po, daily, # 90 cap(s), 1 Refill(s), Type: Maintenance, Pharmacy: [id number], 1 cap(s) po daily  Documented Medications  Documented  aspirin 81 mg oral tablet: 1 tab(s) ( 81 mg ), PO, Daily, 0  atorvastatin 20 mg oral tablet: 1 tab(s) ( 20 mg ), po, hs, Instructions: Trial per Dr. Root, 0 Refill(s), Type: Maintenance   Problem  list:    All Problems  Allergic Rhinitis / ICD-9-.9 / Confirmed  Shift work sleep disorder / SNOMED CT 747286487 / Confirmed  Essential familial hyperlipidemia / ICD-9-.2 / Confirmed  GERD (Gastroesophageal Reflux Disease) / ICD-9-.81 / Confirmed  HTN (Hypertension) / ICD-9-.9 / Confirmed  Severe obstructive sleep apnea / SNOMED CT 475420338 / Confirmed  Resolved: Appendectomy / SNOMED CT 826398470  Resolved: Bowel obstruction / SNOMED CT 814576547      Histories   Past Medical History:    Resolved  Bowel obstruction (SNOMED CT 498662159): Onset in 1989 at 28 years.  Resolved.  Appendectomy (SNOMED CT 639388667): Onset in 1986 at 25 years.  Resolved.   Family History:    Diabetes mellitus  Father  Hypercholesterolemia  Father     Procedure history:    Polysomnography (SNOMED CT 183475101) on 3/7/2016 at 55 Years.  Comments:  3/23/2016 2:33 PM - Serena Tristan CMA  AHI: 36.8  Colonoscopy (SNOMED CT 939038571) performed by Stef Herring MD on 10/30/2015 at 55 Years.  Comments:  11/17/2015 4:44 PM - Debra Liang RN  Sedation: Fentanyl and Midazolam  Indication: Personal history of colon polyps  Findings: Single tubular adenoma no high grade dysplasia. Diverticula in the sigmoid colon.  F/U: Repeat colonoscopy in 5 years  Colonoscopy (SNOMED CT 849565713) on 4/16/2010 at 49 Years.  Comments:  4/30/2012 9:39 AM - Slime Hummel MA  3 polyps removed, 2 were adenoma's  repeat in 5 years  Exploration of abdomen (SNOMED CT 773334884) performed by Ricky Figueroa DO on 7/15/1993 at 32 Years.  Inguinal herniorrhaphy (SNOMED CT 09604796) performed by Be Galvez MD on 1/24/1992 at 31 Years.  Comments:  4/19/2016 7:02 AM - Blanca Reeves  Laparoscopic left with mesh.  Appendectomy (SNOMED CT 332316835) performed by Ramos Wilson MD on 1/21/1986 at 25 Years.  Exploration of abdomen (SNOMED CT 074264580) in the month of 2/1960 at 5 Months.  Comments:  4/19/2016 6:56 AM - Leandro  Blanca  Diverticulosis vs intussusception   Social History:        Alcohol Assessment            Current                     Comments:                      10/02/2012 - Lilly Graves LPN                     Rare      Tobacco Assessment            Never      Substance Abuse Assessment            Never      Employment and Education Assessment            Employed, Work/School description: [employer]      Home and Environment Assessment            Marital status:  (Living together).  Spouse/Partner name: Liana.  Lives with Children, Spouse.  4               children.  Living situation: Home/Independent.      Nutrition and Health Assessment            Type of diet: Regular.      Exercise and Physical Activity Assessment                     Comments:                      10/02/2012 - Lilly Graves LPN                     Active @ work, outdoors.      Sexual Assessment            Sexual orientation: Heterosexual.        Physical Examination   Vital Signs   3/2/2017 11:28 AM CST Temperature Tympanic 97.8 DegF  LOW    Peripheral Pulse Rate 84 bpm    Pulse Site Radial artery    HR Method Manual    Systolic Blood Pressure 126 mmHg    Diastolic Blood Pressure 82 mmHg    Mean Arterial Pressure 97 mmHg    BP Site Left arm    BP Method Manual      Measurements from flowsheet : Measurements   3/2/2017 11:28 AM CST Height Measured - Standard 67.25 in    Ht/Wt Measurement Refused by Patient? Yes      General:  Alert and oriented.    HENT:  Normocephalic.    Neck:  Supple, Non-tender, No lymphadenopathy.    Respiratory:  Lungs are clear to auscultation.    Cardiovascular:  Normal rate, Regular rhythm.       Impression and Plan   Diagnosis     Cold virus (RPS59-HQ J00).     Course:  Improving.    Orders     Form filled out to return to work, starting yesterday.  See copy in chart..

## 2022-02-16 NOTE — NURSING NOTE
Quick Intake Entered On:  9/29/2019 12:25 PM CDT    Performed On:  9/29/2019 12:21 PM CDT by Olga Cowan CMA               Summary   Chief Complaint :   f/u persistant cough. Difficulty sleeping. Saw [facility] 9/25- being treated with antibitoics.    Menstrual Status :   N/A   Weight Measured :   185.2 lb(Converted to: 185 lb 3 oz, 84.01 kg)    Height Measured :   66 in(Converted to: 5 ft 6 in, 167.64 cm)    Body Mass Index :   29.89 kg/m2 (HI)    Body Surface Area :   1.98 m2   Systolic Blood Pressure :   108 mmHg   Diastolic Blood Pressure :   64 mmHg   Mean Arterial Pressure :   79 mmHg   Peripheral Pulse Rate :   93 bpm   BP Site :   Right arm   BP Method :   Manual   HR Method :   Electronic   Temperature Tympanic :   99.8 DegF(Converted to: 37.7 DegC)    Oxygen Saturation :   95 %   Olga Cowan CMA - 9/29/2019 12:21 PM CDT   Health Status   Allergies Verified? :   Yes   Medication History Verified? :   Yes   Immunizations Current :   Other: Pt. checking work for record.   Pre-Visit Planning Status :   N/A   Tobacco Use? :   Never smoker   Olga Cowan CMA - 9/29/2019 12:21 PM CDT   Consents   Consent for Immunization Exchange :   Consent Granted   Consent for Immunizations to Providers :   Consent Granted   Olga Cowan CMA - 9/29/2019 12:21 PM CDT   Meds / Allergies   (As Of: 9/29/2019 12:25:56 PM CDT)   Allergies (Active)   codeine  Estimated Onset Date:   Unspecified ; Reactions:   nausea ; Created By:   Luisa Ramirez MD; Reaction Status:   Active ; Category:   Drug ; Substance:   codeine ; Type:   Allergy ; Updated By:   Luisa Ramirez MD; Reviewed Date:   9/25/2019 4:09 PM CDT        Medication List   (As Of: 9/29/2019 12:25:56 PM CDT)   Prescription/Discharge Order    amoxicillin-clavulanate  :   amoxicillin-clavulanate ; Status:   Prescribed ; Ordered As Mnemonic:   amoxicillin-clavulanate 875 mg-125 mg oral tablet ; Simple Display Line:   1 tab(s), Oral, q12 hrs, for 10 day(s), 20 tab(s), 0 Refill(s)  ; Ordering Provider:   Braxton Hunter PA-C; Catalog Code:   amoxicillin-clavulanate ; Order Dt/Tm:   9/25/2019 4:42:42 PM          lisinopril  :   lisinopril ; Status:   Prescribed ; Ordered As Mnemonic:   lisinopril 40 mg oral tablet ; Simple Display Line:   40 mg, 1 tab(s), po, daily, 90 tab(s), 3 Refill(s) ; Ordering Provider:   Luisa Ramirez MD; Catalog Code:   lisinopril ; Order Dt/Tm:   1/14/2019 8:51:00 AM          Miscellaneous Rx Supply  :   Miscellaneous Rx Supply ; Status:   Prescribed ; Ordered As Mnemonic:   CPAP 7 cm water pressure ; Simple Display Line:   See Instructions, Heated humidifier, heated tubing, filters, nasal or full face mask of choice with headgear.  Replacement cushions and supplies as needed., 1 EA, 11 Refill(s) ; Ordering Provider:   Luisa Ramirez MD; Catalog Code:   Miscellaneous Rx Supply ; Order Dt/Tm:   1/14/2019 8:55:38 AM          omega-3 polyunsaturated fatty acids  :   omega-3 polyunsaturated fatty acids ; Status:   Prescribed ; Ordered As Mnemonic:   omega-3 polyunsaturated fatty acids 1000 mg oral capsule ; Simple Display Line:   2,000 mg, 2 cap(s), Oral, bid, 360 cap(s), 1 Refill(s) ; Ordering Provider:   Aydin Yu MD; Catalog Code:   omega-3 polyunsaturated fatty acids ; Order Dt/Tm:   8/9/2019 1:02:19 PM          omeprazole  :   omeprazole ; Status:   Prescribed ; Ordered As Mnemonic:   omeprazole 40 mg oral delayed release capsule ; Simple Display Line:   1 cap(s), Oral, daily, 90 cap(s), 1 Refill(s) ; Ordering Provider:   Aydin Yu MD; Catalog Code:   omeprazole ; Order Dt/Tm:   8/8/2019 12:02:32 PM            Home Meds    aspirin  :   aspirin ; Status:   Documented ; Ordered As Mnemonic:   aspirin 81 mg oral tablet ; Simple Display Line:   81 mg, 1 tab(s), PO, Daily ; Catalog Code:   aspirin ; Order Dt/Tm:   1/7/2010 1:05:19 PM

## 2022-02-16 NOTE — PROGRESS NOTES
Chief Complaint    Pt c/o right foot pain 3 days. No injury.  History of Present Illness      Chief complaint as above reviewed and confirmed with patient.  Pt presents to the clinic with concerns re: R foot pain.  Sx present for the last 2 days.  No hx of gout.  No new medications.  Not on any diuretics.  No fevers.  Has tried ibuprofen which took the edge off.  Had similar sx several weeks ago that was mild, improved with conservative measures.  Dramatically worse yesterday.  Bed sheets touching foot was painful last night.  Pt reports several weeks ago had R shoulder pain as well which resolved with conservative measures.  No other joint swelling.  Review of Systems      Review of systems is negative with the exception of those noted in HPI          Physical Exam   Vitals & Measurements    T: 97.4   F (Tympanic)  HR: 70(Peripheral)  BP: 112/80     HT: 66 in  WT: 195 lb  BMI: 31.47       nad appears well      exam of the R foot reveals redness, swelling and TTP of the R great toe at the MTP joint.  mild swelling across the dorsum of the foot at the 2-5 MTP joints as well.       BMP WNL      uric acid level pending  Assessment/Plan       Gout (M10.9)         indocin.  Take with food. Rapidly taper as tolerated by sx and switch over to ibuprofen or naproxen as improves.  rest, hard sole shoe. fu prn.         Ordered:          Basic Metabolic Panel (Request), Priority: Urgent, Gout                Orders:         indomethacin, = 1 cap(s) ( 50 mg ), Oral, tid, PRN: for arthritis, # 30 cap(s), 1 Refill(s), Type: Maintenance, Pharmacy: [id number], 1 cap(s) Oral tid,x7 day(s),PRN:for arthritis, (Ordered)         Uric Acid* ([facility]), Specimen Type: Serum, Collection Date: 01/05/20 8:53:00 CST  Patient Information     Name:CANDICE FREY JR      Address:      [address] [id number]     Sex:Male     YOB: 1960     [phone]     Emergency Contact:IRAIS FREY      MRN:[id number]     FIN:[id number]     Location:[facility]     Date of Service:01/05/2020      Primary Care Physician:       Aydin Yu MD, [phone]      Attending Physician:       Pastora LINDSEY, Janice FERNANDES, [phone]  Problem List/Past Medical History    Ongoing     Allergic Rhinitis     Essential familial hyperlipidemia     GERD (Gastroesophageal Reflux Disease)     HTN (Hypertension)     Mixed hyperlipidemia     Obesity     Severe obstructive sleep apnea       Comments: Optimal CPAP titration to 7 cm water pressure on 4/1/2016 HST with AHI 36 and oximetry ankit 80% on 3/7/16     Shift work sleep disorder    Historical     Appendectomy     Bowel obstruction  Procedure/Surgical History     Polysomnography (03/07/2016)            Comments: AHI: 36.8.     Colonoscopy (10/30/2015)            Comments: Sedation: Fentanyl and Midazolam      Indication: Personal history of colon polyps      Findings: Single tubular adenoma no high grade dysplasia. Diverticula in the sigmoid colon.      F/U: Repeat colonoscopy in 5 years.     Colonoscopy (04/16/2010)            Comments: 3 polyps removed, 2 were adenoma's  repeat in 5 years.     Exploration of abdomen (07/15/1993)           Inguinal herniorrhaphy (01/24/1992)            Comments: Laparoscopic left with mesh..     Appendectomy (01/21/1986)           Exploration of abdomen (02.1960)            Comments: Diverticulosis vs intussusception.  Medications    albuterol 90 mcg/inh inhalation aerosol, 2 puff(s), Inhale, q4 hrs, 5 refills    aspirin 81 mg oral tablet, 81 mg= 1 tab(s), Oral, daily    CPAP 7 cm water pressure, See Instructions, 11 refills    Dulera 100 mcg-5 mcg/inh inhalation aerosol, 1 puff(s), Inhale, bid    indomethacin 50 mg oral capsule, 50 mg= 1 cap(s), Oral, tid, PRN, 1 refills    lisinopril 40 mg oral tablet, 40 mg= 1 tab(s), Oral, daily, 3 refills    omega-3 polyunsaturated fatty acids 1000 mg oral capsule, 2000 mg= 2 cap(s),  Oral, bid, 1 refills    omeprazole 40 mg oral delayed release capsule, 1 cap(s), Oral, daily, 1 refills  Allergies    codeine (nausea)  Social History    Smoking Status - 01/05/2020     Never smoker     Alcohol      Current, 10/02/2012     Employment/School      Employed, Work/School description: [employer], 10/02/2012     Exercise     Home/Environment      Marital status:  (Living together). Spouse/Partner name: Liana. Lives with Children, Spouse. 4 children. Living situation: Home/Independent., 10/02/2012     Nutrition/Health      Type of diet: Regular., 10/02/2012     Sexual      Sexual orientation: Heterosexual., 10/02/2012     Substance Abuse      Never, 10/02/2012     Tobacco      Never, 10/02/2012  Family History    Diabetes mellitus: Father.    Hypercholesterolemia: Father.  Immunizations      Vaccine Date Status          tetanus/diphth/pertuss (Tdap) adult/adol 02/04/2015 Given          influenza virus vaccine, inactivated 11/01/2013 Recorded              Comments : [employer]          influenza virus vaccine, inactivated 11/19/2009 Recorded          influenza virus vaccine, inactivated 11/07/2002 Recorded  Lab Results       Lab Results (Last 4 results within 90 days)        Sodium Level: 139 [135 mmol/L - 145 mmol/L] (01/05/20 09:09:00)       Potassium Level: 4.5 [3.5 mmol/L - 5 mmol/L] (01/05/20 09:09:00)       Chloride Level: 105 [98 mmol/L - 110 mmol/L] (01/05/20 09:09:00)       CO2 Level: 24 [21 mmol/L - 31 mmol/L] (01/05/20 09:09:00)       AGAP: 10 [5  - 18] (01/05/20 09:09:00)       Glucose Level: 99 [65 mg/dL - 100 mg/dL] (01/05/20 09:09:00)       BUN: 15 [8 mg/dL - 25 mg/dL] (01/05/20 09:09:00)       Creatinine Level: 1.13 [0.72 mg/dL - 1.25 mg/dL] (01/05/20 09:09:00)       BUN/Creat Ratio: 13 [10  - 20] (01/05/20 09:09:00)       eGFR : >60 (01/05/20 09:09:00)       eGFR Non-: >60 (01/05/20 09:09:00)       Calcium Level: 9.9 [8.5 mg/dL - 10.5 mg/dL]  (01/05/20 09:09:00)

## 2022-02-16 NOTE — TELEPHONE ENCOUNTER
Entered by Sofi Cisneros CMA on August 07, 2019 9:13:00 AM CDT  ---------------------  From: Sofi Cisneros CMA   To: [place] [phone]    Sent: 8/7/2019 9:13:00 AM CDT  Subject: Medication Management     ** Not Approved: Refill not appropriate, Pt was given an Rx on 1/14/19 #90 w/ 3 refills. **  omeprazole (OMEPRAZOLE DR 40MG  CAP)  TAKE 1 CAPSULE BY MOUTH ONCE DAILY  Qty:  90 cap(s)        Days Supply:  90        Refills:  0          Substitutions Allowed     Route To Pharmacy - [facility]   Signed by Sofi Cisneros CMA            ** Patient matched by Sofi Cisneros CMA on 8/7/2019 9:11:40 AM CDT **      ------------------------------------------  From: [place] [id number]  To: Aydin Yu MD  Sent: August 6, 2019 7:41:54 AM CDT  Subject: Medication Management  Due: August 7, 2019 7:41:54 AM CDT    ** On Hold Pending Signature **  Drug: omeprazole (omeprazole 40 mg oral delayed release capsule)  TAKE 1 CAPSULE BY MOUTH ONCE DAILY  Quantity: 90 cap(s)     Days Supply: 0         Refills: 0  Substitutions Allowed  Notes from Pharmacy:     Dispensed Drug: omeprazole (omeprazole 40 mg oral delayed release capsule)  TAKE 1 CAPSULE BY MOUTH ONCE DAILY  Quantity: 90 cap(s)     Days Supply: 90        Refills: 0  Substitutions Allowed  Notes from Pharmacy:   ------------------------------------------

## 2022-02-16 NOTE — TELEPHONE ENCOUNTER
---------------------  From: Lexii Matos CMA   Sent: 1/31/2021 11:37:39 AM CST  Subject: Dulera alternative     Per BAM, CSS called patients pharmacy to inquire about alternatives to the Dulera as this is too costly for patient. Per [facility], patient will need to contact insurance to get cost details on each formulary alternative. Pharmacy is guessing Advair HFA would be most cost effective, but cannot guarantee. Informed BAM.

## 2022-02-16 NOTE — NURSING NOTE
CAGE Assessment Entered On:  9/9/2021 2:48 PM CDT    Performed On:  9/9/2021 2:48 PM CDT by Courtney Parks CMA               Assessment   Have you ever felt you should cut down on your drinking :   Yes   Have people annoyed you by criticizing your drinking :   No   Have you ever felt bad or guilty about your drinking :   No   Have you ever taken a drink first thing in the morning to steady your nerves or get rid of a hangover (Eye-opener) :   No   CAGE Score :   1    Courtney Parks CMA - 9/9/2021 2:48 PM CDT

## 2022-02-16 NOTE — LETTER
(Inserted Image. Unable to display)   [address] [place] [address] [phone] (phone)  [phone] (fax)  CANDICE FREY     [address] [place] [address]        Dear CANDICE,    Thank you for selecting our practice as your care provider. Below you will find the results and recent diagnostic testings outcomes we have reviewed.        These are acceptable, please keep scheduled follow up appointments.      Result Name Current Result Previous Result Reference Range   Glucose Level (mg/dL) ((H)) 111 10/5/2021 ((H)) 101 7/30/2021   95 8/18/2020  ((H)) 104 6/2/2020 65 - 99   BUN (mg/dL)  12 10/5/2021  17 7/30/2021   19 8/18/2020   22 6/2/2020 7 - 25   Creatinine Level (mg/dL)  1.04 10/5/2021  0.96 7/30/2021   1.10 8/18/2020   1.16 6/2/2020 0.70 - 1.25   eGFR (mL/min/1.73m2)  77 10/5/2021  85 7/30/2021   72 8/18/2020   68 6/2/2020 > OR = 60 -    Sodium Level (mmol/L)  140 10/5/2021  140 7/30/2021   137 8/18/2020   139 6/2/2020 135 - 146   Potassium Level (mmol/L)  4.2 10/5/2021  4.3 7/30/2021   4.7 8/18/2020   4.6 6/2/2020 3.5 - 5.3   Chloride Level (mmol/L)  105 10/5/2021  105 7/30/2021   102 8/18/2020   104 6/2/2020 98 - 110   CO2 Level (mmol/L)  25 10/5/2021  28 7/30/2021   28 8/18/2020   23 6/2/2020 20 - 32   Calcium Level (mg/dL) ((H)) 10.4 10/5/2021  10.1 7/30/2021   10.0 8/18/2020   10.3 6/2/2020 8.6 - 10.3   Protein Total (gm/dL)  7.0 10/5/2021  6.9 7/30/2021   7.0 8/18/2020 6.1 - 8.1   Albumin Level (gm/dL)  4.6 10/5/2021  4.7 7/30/2021   4.8 8/18/2020 3.6 - 5.1   Globulin  2.4 10/5/2021  2.2 7/30/2021   2.2 8/18/2020 1.9 - 3.7   A/G Ratio  1.9 10/5/2021  2.1 7/30/2021   2.2 8/18/2020 1.0 - 2.5   Bilirubin Total (mg/dL)  0.4 10/5/2021  0.4 7/30/2021   0.7 8/18/2020 0.2 - 1.2   Alkaline Phosphatase (unit/L)  95 10/5/2021  80 7/30/2021   67 8/18/2020 35 - 144   AST/SGOT (unit/L)  25 10/5/2021  26 7/30/2021   28 8/18/2020 10 - 35   ALT/SGPT (unit/L)  40 10/5/2021  41 7/30/2021   40 8/18/2020 9  - 46   Tryptase (mcg/L)  7.5 10/5/2021   - <11.0   Sed Rate  11 10/5/2021   - < OR = 20   WBC  8.4 10/5/2021  7.1 6/2/2020 3.8 - 10.8   RBC  5.03 10/5/2021  5.54 6/2/2020 4.20 - 5.80   Hgb (gm/dL)  14.5 10/5/2021  16.0 6/2/2020 13.2 - 17.1   Hct (%)  42.0 10/5/2021  47.2 6/2/2020 38.5 - 50.0   MCV (fL)  83.5 10/5/2021  85.2 6/2/2020 80.0 - 100.0   MCH (pg)  28.8 10/5/2021  28.9 6/2/2020 27.0 - 33.0   MCHC (gm/dL)  34.5 10/5/2021  33.9 6/2/2020 32.0 - 36.0   RDW (%)  13.4 10/5/2021  13.5 6/2/2020 11.0 - 15.0   Platelet  263 10/5/2021  280 6/2/2020 140 - 400   MPV (fL)  11.1 10/5/2021  11.1 6/2/2020 7.5 - 12.5   TSH (mIU/L)  2.67 10/5/2021  0.40 - 4.50       Please contact my practice at the number listed above if you have any questions or concerns.     Sincerely,        Aydin Yu MD, FAAFP      What do your labs mean? Below is a glossary of commonly ordered labs:  LDL - Bad Cholesterol HDL - Good Cholesterol AST/ALT - Liver Function  PSA -  Prostate  TSH - Thyroid  Hgb (Hemoglobin) - Red Blood Cells  Cr/Creatinine/Microalbumin/ BUN/eGFR - Kidney Function HgbA1c - Diabetes Test

## 2022-02-16 NOTE — TELEPHONE ENCOUNTER
---------------------  From: Olga Cowan CMA   Sent: 9/29/2019 2:26:18 PM CDT  Subject: follow up     Per BAM spoke to patient at 1425. Advised him that BAM had a chance to talk to radiologist who also agreed that the left lower lung showed a small amount of pneumonia. Patient verbalized understanding and had no further questions.

## 2022-02-16 NOTE — LETTER
(Inserted Image. Unable to display)   [address] [place] [address]  [phone] (phone)  [phone] (fax)  CANDICE FREY     [address] [address]        Dear CANDICE,    Thank you for selecting our practice as your care provider. Below you will find the results and recent diagnostic testings outcomes we have reviewed.        These are acceptable, please keep scheduled follow up appointments.      Result Name Current Result Reference Range   Vitamin D, 1, 25 Dihydroxy Total (pg/mL)  50 10/5/2021 18 - 72   Vitamin D3, 1, 25 Dihydroxy (pg/mL)  50 10/5/2021    Vitamin D2, 1, 25 Dihydroxy (pg/mL)  <8 10/5/2021        Please contact my practice at the number listed above if you have any questions or concerns.     Sincerely,        Aydin Yu MD, FAAFP      What do your labs mean? Below is a glossary of commonly ordered labs:  LDL - Bad Cholesterol HDL - Good Cholesterol AST/ALT - Liver Function  PSA -  Prostate  TSH - Thyroid  Hgb (Hemoglobin) - Red Blood Cells  Cr/Creatinine/Microalbumin/ BUN/eGFR - Kidney Function HgbA1c - Diabetes Test

## 2022-02-16 NOTE — PROGRESS NOTES
Patient:   CANDICE FREY JR            MRN: [id number]            FIN: [id number]               Age:   58 years     Sex:  Male     :  1960   Associated Diagnoses:   Malaise   Author:   Aydin Yu MD      Chief Complaint   3/29/2019 10:46 AM CDT   Tired and low back ache, tightness in legs x 2 weeks     Chief complaint and symptoms noted above confirmed with patient.      History of Present Illness             The patient presents with fatigue.  The fatigue is described as decreased energy and weariness.  The severity of the fatigue is moderate.  The fatigue is constant.  The fatigue symptom(s) has lasted for 2 week(s).  The context of the fatigue: occurred all day.  Retired a month ago.  Exacerbating factors consist of none.  Relieving factors consist of rest.  Associated symptoms consist of myalgia and Low back Pain.        Review of Systems   Constitutional:  Negative except as documented in history of present illness.    Cardiovascular:  Negative.    Gastrointestinal:  Negative.    Musculoskeletal:  Negative except as documented in history of present illness.       Health Status   Allergies:    Allergic Reactions (Selected)  Severity Not Documented  Codeine (Nausea)   Medications:  (Selected)   Prescriptions  Prescribed  CPAP 7 cm water pressure: CPAP 7 cm water pressure, See Instructions, Instructions: Heated humidifier, heated tubing, filters, nasal or full face mask of choice with headgear.  Replacement cushions and supplies as needed., Supply, # 1 EA, 11 Refill(s), Type: Maintenance  atorvastatin 40 mg oral tablet: = 1 tab(s) ( 40 mg ), po, daily, # 90 tab(s), 3 Refill(s), Type: Maintenance, Pharmacy: [facility], hold on file - will contact when refills are needed, 1 tab(s) Oral daily  lisinopril 40 mg oral tablet: = 1 tab(s) ( 40 mg ), po, daily, # 90 tab(s), 3 Refill(s), Type: Maintenance, Pharmacy: [facility], hold on file - will contact when refills are needed, 1 tab(s)  Oral daily  omeprazole 40 mg oral delayed release capsule: = 1 cap(s) ( 40 mg ), po, daily, # 90 cap(s), 3 Refill(s), Type: Maintenance, Pharmacy: [id number], hold on file - will contact when refills are needed, 1 cap(s) Oral daily  Documented Medications  Documented  Lovaza: ( 2,000 mg ), po, bid, 0 Refill(s), Type: Maintenance  aspirin 81 mg oral tablet: 1 tab(s) ( 81 mg ), PO, Daily, 0   Problem list:    All Problems (Selected)  Allergic Rhinitis / ICD-9-.9 / Confirmed  Shift work sleep disorder / SNOMED CT 210808852 / Confirmed  Essential familial hyperlipidemia / ICD-9-.2 / Confirmed  GERD (Gastroesophageal Reflux Disease) / ICD-9-.81 / Confirmed  HTN (Hypertension) / ICD-9-.9 / Confirmed  Mixed hyperlipidemia / SNOMED CT 720771761 / Confirmed  Obesity / SNOMED CT 3073460308 / Probable  Severe obstructive sleep apnea / SNOMED CT 594306508 / Confirmed      Histories   Past Medical History:    Resolved  Bowel obstruction (SNOMED CT 726155235): Onset in 1989 at 28 years.  Resolved.  Appendectomy (SNOMED CT 121796348): Onset in 1986 at 25 years.  Resolved.   Family History:    Diabetes mellitus  Father  Hypercholesterolemia  Father     Procedure history:    Polysomnography (SNOMED CT 167179598) on 3/7/2016 at 55 Years.  Comments:  3/23/2016 2:33 PM CDT - Serena Tristan CMA  AHI: 36.8  Colonoscopy (SNOMED CT 436097485) performed by Stef Herring MD on 10/30/2015 at 55 Years.  Comments:  11/17/2015 4:44 PM GEORGETTE - Debra Liang RN  Sedation: Fentanyl and Midazolam  Indication: Personal history of colon polyps  Findings: Single tubular adenoma no high grade dysplasia. Diverticula in the sigmoid colon.  F/U: Repeat colonoscopy in 5 years  Colonoscopy (SNOMED CT 149708770) on 4/16/2010 at 49 Years.  Comments:  4/30/2012 9:39 AM CDT - Slime Hummel MA  3 polyps removed, 2 were adenoma's  repeat in 5 years  Exploration of abdomen (SNOMED CT 255898552) performed by Carolina GONZALEZ  Ricky on 7/15/1993 at 32 Years.  Inguinal herniorrhaphy (SNOMED CT 04453692) performed by Be Galvez MD on 1/24/1992 at 31 Years.  Comments:  4/19/2016 7:02 AM CDT - Blanca Reeves  Laparoscopic left with mesh.  Appendectomy (SNOMED CT 967951294) performed by Ramos Wilson MD on 1/21/1986 at 25 Years.  Exploration of abdomen (SNOMED CT 598603618) in the month of 2/1960 at 5 Months.  Comments:  4/19/2016 6:56 AM CDT - Blanca Reeves  Diverticulosis vs intussusception      Physical Examination   Vital Signs   3/29/2019 10:46 AM CDT Temperature Tympanic 97.4 DegF  LOW    Peripheral Pulse Rate 68 bpm    Pulse Site Radial artery    HR Method Manual    Systolic Blood Pressure 126 mmHg    Diastolic Blood Pressure 74 mmHg    Mean Arterial Pressure 91 mmHg    BP Site Left arm    BP Method Manual      Measurements from flowsheet : Measurements   3/29/2019 10:46 AM CDT Height Measured - Standard 66 in    Weight Measured - Standard 189.6 lb    BSA 2 m2    Body Mass Index 30.6 kg/m2  HI      General:  Alert and oriented, No acute distress.    Neck:  Supple, Non-tender, No lymphadenopathy, No thyromegaly.    Respiratory:  Lungs are clear to auscultation, Respirations are non-labored, Symmetrical chest wall expansion, No chest wall tenderness.    Cardiovascular:  Normal rate, Regular rhythm, No murmur, No edema.    Gastrointestinal:  Soft, Non-tender, Non-distended, Normal bowel sounds, No organomegaly.    Musculoskeletal:       Spine/torso exam: Thoracic ( Posterior, No swelling, Tenderness ).    Integumentary:  Warm, Dry, Pink, No rash.       Impression and Plan   Diagnosis     Malaise ([id number] R53.81).     Course:  Not improving.    Orders     Orders   Lab (Gen Lab  Reference Lab):  Sed rate by modified westergren* ([facility]) (Order): Specimen Type: Blood, Collection Date: 3/29/2019 11:04 AM CDT  TSH* ([facility]) (Order): Specimen Type: Serum, Collection Date: 3/29/2019 11:04 AM CDT  Basic Metabolic Panel* ([facility])  (Order): Specimen Type: Serum, Collection Date: 3/29/2019 11:04 AM CDT  CBC (h/h, RBC, indices, WBC, Plt)* ([facility]) (Order): Specimen Type: Blood, Collection Date: 3/29/2019 11:04 AM CDT.     Orders   Pharmacy:  atorvastatin 40 mg oral tablet (Suspend).     Orders   Requests (Return to Office):  Return to Clinic (Request) (Order): Return in 1 month.     Will use Aleve or Advil for pain.

## 2022-02-16 NOTE — PROGRESS NOTES
Patient:   CANDICE FREY JR            MRN: [id number]            FIN: [id number]               Age:   57 years     Sex:  Male     :  1960   Associated Diagnoses:   Hypertension; Pure hypercholesterolemia   Author:   Aydin Yu MD      Visit Information      Date of Service: 2018 08:37 am  Performing Location: [facility]  Encounter#: [id number]      Primary Care Provider (PCP):  Aydin Yu MD    NPI# [id number]      Referring Provider:  Aydin Yu MD, NPI# [id number]      Chief Complaint   2018 8:42 AM CDT    HTN med check, review labs     Chief complaint and symptoms noted above confirmed with patient.      History of Present Illness             The patient presents for follow-up evaluation of hypertension.  The quality of hypertension symptom(s) since the patient's last visit is described as being unchanged.  The severity of the hypertension symptom(s) since the last visit is moderate.  Since the patient's last visit, the timing/course of hypertension symptom(s) is constant.  Exacerbating factors consist of none.  Relieving factors consist of medication.        Interval History   Cholesterol Management   Total cholesterol results See below.        Review of Systems   Constitutional:  Negative.    Ear/Nose/Mouth/Throat:  Negative.    Respiratory:  Negative.    Cardiovascular:  Negative except as documented in history of present illness.    Gastrointestinal:  Negative.       Health Status   Allergies:    Allergic Reactions (Selected)  Severity Not Documented  Codeine (Nausea)   Medications:  (Selected)   Prescriptions  Prescribed  CPAP 7 cm water pressure: CPAP 7 cm water pressure, See Instructions, Instructions: Heated humidifier, heated tubing, filters, nasal or full face mask of choice with headgear.  Replacement cushions and supplies as needed., Supply, # 1 EA, 11 Refill(s), Type: Maintenance, Heate...  atorvastatin 40 mg oral tablet: 1 tab(s) ( 40  mg ), po, daily, # 90 tab(s), 3 Refill(s), Type: Maintenance, Pharmacy: [facility] [id number] [facility], 1 tab(s) po daily  lisinopril 40 mg oral tablet: 1 tab(s) ( 40 mg ), po, daily, # 90 tab(s), 3 Refill(s), Type: Maintenance, Pharmacy: [facility] [id number] [facility], 1 tab(s) po daily  omeprazole 40 mg oral delayed release capsule: 1 cap(s) ( 40 mg ), po, daily, # 90 cap(s), 3 Refill(s), Type: Maintenance, Pharmacy: [id number], Needs appt for further refills, 1 cap(s) po daily  Documented Medications  Documented  Lovaza: ( 2,000 mg ), po, bid, 0 Refill(s), Type: Maintenance  aspirin 81 mg oral tablet: 1 tab(s) ( 81 mg ), PO, Daily, 0   Problem list:    All Problems (Selected)  Allergic Rhinitis / ICD-9-.9 / Confirmed  Shift work sleep disorder / SNOMED CT 457717826 / Confirmed  Essential familial hyperlipidemia / ICD-9-.2 / Confirmed  GERD (Gastroesophageal Reflux Disease) / ICD-9-.81 / Confirmed  HTN (Hypertension) / ICD-9-.9 / Confirmed  Mixed hyperlipidemia / SNOMED CT 404977550 / Confirmed  Obesity / SNOMED CT 3978608354 / Probable  Severe obstructive sleep apnea / SNOMED CT 686189545 / Confirmed      Histories   Past Medical History:    Resolved  Bowel obstruction (SNOMED CT 212651424): Onset in 1989 at 28 years.  Resolved.  Appendectomy (SNOMED CT 415096058): Onset in 1986 at 25 years.  Resolved.   Family History:    Diabetes mellitus  Father  Hypercholesterolemia  Father     Procedure history:    Polysomnography (SNOMED CT 402903039) on 3/7/2016 at 55 Years.  Comments:  3/23/2016 2:33 PM - Serena Tristan CMA  AHI: 36.8  Colonoscopy (SNOMED CT 553613261) performed by Stef Herring MD on 10/30/2015 at 55 Years.  Comments:  11/17/2015 4:44 PM - Debra Liang RN  Sedation: Fentanyl and Midazolam  Indication: Personal history of colon polyps  Findings: Single tubular adenoma no high grade dysplasia. Diverticula in the sigmoid colon.  F/U: Repeat colonoscopy in 5 years  Colonoscopy  (SNOMED CT 650237476) on 4/16/2010 at 49 Years.  Comments:  4/30/2012 9:39 AM - Slime Hummel MA  3 polyps removed, 2 were adenoma's  repeat in 5 years  Exploration of abdomen (SNOMED CT 147693474) performed by Ricky Figueroa DO on 7/15/1993 at 32 Years.  Inguinal herniorrhaphy (SNOMED CT 81515266) performed by Be Galvez MD on 1/24/1992 at 31 Years.  Comments:  4/19/2016 7:02 AM - Blanca Reeves  Laparoscopic left with mesh.  Appendectomy (SNOMED CT 149388177) performed by Ramos Wilson MD on 1/21/1986 at 25 Years.  Exploration of abdomen (SNOMED CT 765191567) in the month of 2/1961 at 4 Months.  Comments:  4/19/2016 6:56 AM - Blanca Reeves  Diverticulosis vs intussusception   Social History:        Alcohol Assessment            Current                     Comments:                      10/02/2012 - Lilly Graves LPN                     Rare      Tobacco Assessment            Never      Substance Abuse Assessment            Never      Employment and Education Assessment            Employed, Work/School description: [employer].      Home and Environment Assessment            Marital status:  (Living together).  Spouse/Partner name: Liana.  Lives with Children, Spouse.  4               children.  Living situation: Home/Independent.      Nutrition and Health Assessment            Type of diet: Regular.      Exercise and Physical Activity Assessment                     Comments:                      10/02/2012 - Lilly Graves LPN                     Active @ work, outdoors.      Sexual Assessment            Sexual orientation: Heterosexual.        Physical Examination   Vital Signs   5/23/2018 8:42 AM CDT Temperature Tympanic 97.3 DegF  LOW    Peripheral Pulse Rate 72 bpm    Pulse Site Radial artery    HR Method Manual    Systolic Blood Pressure 112 mmHg    Diastolic Blood Pressure 64 mmHg    Mean Arterial Pressure 80 mmHg    BP Site Left arm    BP Method Manual      Measurements from  flowsheet : Measurements   5/23/2018 8:42 AM CDT Height Measured - Standard 66 in    Weight Measured - Standard 190.4 lb    BSA 2 m2    Body Mass Index 30.73 kg/m2  HI      General:  Alert and oriented, No acute distress.    Eye:  Normal conjunctiva.    HENT:  Normocephalic.    Neck:  Supple.    Respiratory:  Lungs are clear to auscultation, Respirations are non-labored.    Cardiovascular:  Normal rate, Regular rhythm, No murmur.    Gastrointestinal:  Soft, Non-tender, Non-distended.       Review / Management   Results review:  Lab results   5/10/2018 8:53 AM CDT Sodium Level 137 mmol/L    Potassium Level 4.8 mmol/L    Chloride Level 103 mmol/L    CO2 Level 23 mmol/L    Glucose Level 98 mg/dL    BUN 20 mg/dL    Creatinine 1.03 mg/dL    BUN/Creat Ratio NOT APPLICABLE    eGFR 80 mL/min/1.73m2    eGFR African American 92 mL/min/1.73m2    Calcium Level 9.6 mg/dL    Cholesterol 182 mg/dL    Non-  HI    HDL 39 mg/dL  LOW    Chol/HDL Ratio 4.7    LDL 94    Triglyceride 366 mg/dL  HI   .       Impression and Plan   Diagnosis     Hypertension (ZSS23-EV I10).     Course:  Improving, Progressing as expected.    Orders     Orders (Selected)   Prescriptions  Prescribed  lisinopril 40 mg oral tablet: 1 tab(s) ( 40 mg ), po, daily, # 90 tab(s), 3 Refill(s), Type: Maintenance, Pharmacy: [facility], 1 tab(s) po daily.     Diagnosis     Pure hypercholesterolemia (TLB02-LL E78.00).     Course:  Progressing as expected.    Orders     Orders (Selected)   Prescriptions  Prescribed  atorvastatin 40 mg oral tablet: 1 tab(s) ( 40 mg ), po, daily, # 90 tab(s), 3 Refill(s), Type: Maintenance, Pharmacy: [facility], 1 tab(s) po daily.     Orders     Orders   Requests (Return to Office):  Return to Clinic (Request) (Order): RFV: CBC, LIPID, Comp,  1 week prior, Return in 12 months.

## 2022-02-16 NOTE — TELEPHONE ENCOUNTER
---------------------  From: Sangita Ortega RN   Sent: 3/15/2021 5:27:42 PM CDT  Subject: General Message     Phone Message    PCP:   KIERAN      Time of Call:  1726       Person Calling:  Pt  Phone number:  [phone]    Returned call at: _    Note:   Received direct call from patient regarding new onset of rash that is over his entire body. He states that he has had this in the past and was given a prescription that helped. Advised appointment or video visit. He would like to set up video visit - transferred to scheduling.     Last office visit and reason:  _

## 2022-02-16 NOTE — TELEPHONE ENCOUNTER
"---------------------  From: Shyla Newton CMA   To: Aydin Yu MD;     Sent: 3/25/2021 2:54:08 PM CDT  Subject: General Message - hives      Phone Message    PCP:   CHT      Time of Call:  1255       Person Calling:  self  Phone number:  [phone]    Note:   pt had a virtual visit 3/10 for hives, rx'd prednisone x 7days  by [facility] and hives went away.  Hives started  back again last night and \"exploded\" today-everywhere.  He's currently at work, took 2 Zyrtec this am.  Denies any SOB, difficulty swallowing.  ? what he can do/take for these. Please advise     Last office visit and reason:  3/10 [facility]---------------------  From: Aydin Yu MD   To: Shyla Newton CMA;     Sent: 3/25/2021 3:11:07 PM CDT  Subject: RE: General Message - hives      Patient called and given taper of prednisone.  "

## 2022-02-16 NOTE — LETTER
(Inserted Image. Unable to display)                                                                                                                                                        April 05, 2021Re: CANDICE SETHI1960  [facility] Flaherty  [id number]  [id number]:   [facility] Yolanda FlahertyThe following patient has been referred to your office/practice:  CANDICE FREY Appointment:  Appointment is PendingPlease refer to the attached  clinical documentation for a summary of CANDICE's care.  Please do not hesitate to contact our office if any additional clinical questions arise.  All relevant records and transition of care documents should be mailed or faxed.Your assistance in providing continuity of care is appreciated. Sincerely, [facility] [place] [address] [id number] [place] [address](P) [phone](F) [phone]

## 2022-02-16 NOTE — NURSING NOTE
Comprehensive Intake Entered On:  10/15/2019 3:25 PM CDT    Performed On:  10/15/2019 3:23 PM CDT by Trina Patino MA               Summary   Chief Complaint :   Follow up Pneumonia, still has dry cough    Menstrual Status :   N/A   Weight Measured :   186.4 lb(Converted to: 186 lb 6 oz, 84.55 kg)    Height Measured :   66 in(Converted to: 5 ft 6 in, 167.64 cm)    Body Mass Index :   30.08 kg/m2 (HI)    Body Surface Area :   1.98 m2   Systolic Blood Pressure :   112 mmHg   Diastolic Blood Pressure :   66 mmHg   Mean Arterial Pressure :   81 mmHg   Peripheral Pulse Rate :   80 bpm   BP Site :   Right arm   Pulse Site :   Radial artery   BP Method :   Manual   HR Method :   Manual   Temperature Tympanic :   98.7 DegF(Converted to: 37.1 DegC)    Trina Patino MA - 10/15/2019 3:23 PM CDT   Health Status   Allergies Verified? :   Yes   Medication History Verified? :   Yes   Immunizations Current :   Other: Pt. checking work for record.   Medical History Verified? :   Yes   Pre-Visit Planning Status :   Completed   Tobacco Use? :   Never smoker   Trina Patino MA - 10/15/2019 3:23 PM CDT   Consents   Consent for Immunization Exchange :   Consent Granted   Consent for Immunizations to Providers :   Consent Granted   Trina Paitno MA - 10/15/2019 3:23 PM CDT   Meds / Allergies   (As Of: 10/15/2019 3:25:54 PM CDT)   Allergies (Active)   codeine  Estimated Onset Date:   Unspecified ; Reactions:   nausea ; Created By:   Luisa Ramirez MD; Reaction Status:   Active ; Category:   Drug ; Substance:   codeine ; Type:   Allergy ; Updated By:   Luisa Ramirez MD; Reviewed Date:   10/15/2019 3:24 PM CDT        Medication List   (As Of: 10/15/2019 3:25:54 PM CDT)   Prescription/Discharge Order    albuterol  :   albuterol ; Status:   Prescribed ; Ordered As Mnemonic:   albuterol 90 mcg/inh inhalation aerosol ; Simple Display Line:   2 puff(s), Inhale, q4 hrs, 17 gm, 0 Refill(s) ; Ordering Provider:   Janice Guillory PA-C  A; Catalog Code:   albuterol ; Order Dt/Tm:   9/29/2019 2:00:10 PM CDT          omega-3 polyunsaturated fatty acids  :   omega-3 polyunsaturated fatty acids ; Status:   Prescribed ; Ordered As Mnemonic:   omega-3 polyunsaturated fatty acids 1000 mg oral capsule ; Simple Display Line:   2,000 mg, 2 cap(s), Oral, bid, 360 cap(s), 1 Refill(s) ; Ordering Provider:   Aydin Yu MD; Catalog Code:   omega-3 polyunsaturated fatty acids ; Order Dt/Tm:   8/9/2019 1:02:19 PM CDT          omeprazole  :   omeprazole ; Status:   Prescribed ; Ordered As Mnemonic:   omeprazole 40 mg oral delayed release capsule ; Simple Display Line:   1 cap(s), Oral, daily, 90 cap(s), 1 Refill(s) ; Ordering Provider:   Aydin Yu MD; Catalog Code:   omeprazole ; Order Dt/Tm:   8/8/2019 12:02:32 PM CDT          Miscellaneous Rx Supply  :   Miscellaneous Rx Supply ; Status:   Prescribed ; Ordered As Mnemonic:   CPAP 7 cm water pressure ; Simple Display Line:   See Instructions, Heated humidifier, heated tubing, filters, nasal or full face mask of choice with headgear.  Replacement cushions and supplies as needed., 1 EA, 11 Refill(s) ; Ordering Provider:   Luisa Ramirez MD; Catalog Code:   Miscellaneous Rx Supply ; Order Dt/Tm:   1/14/2019 8:55:38 AM CST          lisinopril  :   lisinopril ; Status:   Prescribed ; Ordered As Mnemonic:   lisinopril 40 mg oral tablet ; Simple Display Line:   40 mg, 1 tab(s), po, daily, 90 tab(s), 3 Refill(s) ; Ordering Provider:   Luisa Ramirez MD; Catalog Code:   lisinopril ; Order Dt/Tm:   1/14/2019 8:51:00 AM CST            Home Meds    aspirin  :   aspirin ; Status:   Documented ; Ordered As Mnemonic:   aspirin 81 mg oral tablet ; Simple Display Line:   81 mg, 1 tab(s), PO, Daily ; Catalog Code:   aspirin ; Order Dt/Tm:   1/7/2010 1:05:19 PM CST

## 2022-02-16 NOTE — LETTER
(Inserted Image. Unable to display)     [address], [place] [id number] [phone] (phone)  [phone] (fax)  CANDICE FREY     [address] [place], [place] [id number]        Dear CANDICE,    Thank you for selecting our practice as your care provider. Below you will find the results and recent diagnostic testings outcomes we have reviewed.        Please make an appointment to discuss these results.      Result Name Current Result Previous Result Reference Range   Sodium Level (mmol/L)  139 6/2/2020  139 1/5/2020   138 3/29/2019   139 1/14/2019 135 - 146   Potassium Level (mmol/L)  4.6 6/2/2020  4.5 1/5/2020   4.3 3/29/2019   5.0 1/14/2019 3.5 - 5.3   Chloride Level (mmol/L)  104 6/2/2020  105 1/5/2020   101 3/29/2019   103 1/14/2019 98 - 110   CO2 Level (mmol/L)  23 6/2/2020  24 1/5/2020   29 3/29/2019   30 1/14/2019 20 - 32   Glucose Level (mg/dL) ((H)) 104 6/2/2020  99 1/5/2020   90 3/29/2019   99 1/14/2019 65 - 99   BUN (mg/dL)  22 6/2/2020  15 1/5/2020   12 3/29/2019   12 1/14/2019 7 - 25   Creatinine Level (mg/dL)  1.16 6/2/2020  1.13 1/5/2020   0.96 3/29/2019   0.97 1/14/2019 0.70 - 1.25   eGFR (mL/min/1.73m2)  68 6/2/2020  86 3/29/2019   85 1/14/2019 > OR = 60 -    Calcium Level (mg/dL)  10.3 6/2/2020  9.9 1/5/2020   9.8 3/29/2019   10.3 1/14/2019 8.6 - 10.3   Cholesterol (mg/dL) ((H)) 315 6/2/2020 ((H)) 202 1/14/2019  - <200   Non-HDL Cholesterol ((H)) 276 6/2/2020 ((H)) 166 1/14/2019  - <130   HDL (mg/dL) ((L)) 39 6/2/2020 ((L)) 36 1/14/2019 > OR = 40 -    Cholesterol/HDL Ratio ((H)) 8.1 6/2/2020 ((H)) 5.6 1/14/2019  - <5.0   LDL  See comment 6/2/2020  See comment 1/14/2019    LDL Direct (mg/dL) ((H)) 172 6/2/2020  87 1/14/2019  - <100   Triglyceride (mg/dL) ((H)) 626 6/2/2020 ((H)) 443 1/14/2019  - <150   WBC  7.1 6/2/2020  10.2 9/29/2019   8.5 3/29/2019 3.8 - 10.8   RBC  5.54 6/2/2020  5.06 9/29/2019   5.38 3/29/2019 4.20 - 5.80   Hgb (gm/dL)  16.0 6/2/2020  14.9 9/29/2019   15.8 3/29/2019  13.2 - 17.1   Hct (%)  47.2 6/2/2020  42.5 9/29/2019   44.7 3/29/2019 38.5 - 50.0   MCV (fL)  85.2 6/2/2020  84 9/29/2019   83.1 3/29/2019 80.0 - 100.0   MCH (pg)  28.9 6/2/2020  29.4 9/29/2019   29.4 3/29/2019 27.0 - 33.0   MCHC (gm/dL)  33.9 6/2/2020  35.1 9/29/2019   35.3 3/29/2019 32.0 - 36.0   RDW (%)  13.5 6/2/2020  13.6 9/29/2019   14.0 3/29/2019 11.0 - 15.0   Platelet  280 6/2/2020  276 9/29/2019   265 3/29/2019 140 - 400   MPV (fL)  11.1 6/2/2020  10.6 9/29/2019   11.5 3/29/2019 7.5 - 12.5       Please contact my practice at the number listed above if you have any questions or concerns.     Sincerely,        Aydin Yu MD, FAAFP

## 2022-02-16 NOTE — NURSING NOTE
Comprehensive Intake Entered On:  1/5/2020 8:45 AM CST    Performed On:  1/5/2020 8:40 AM CST by Lexii Brown               Summary   Chief Complaint :   Pt c/o right foot pain 3 days. No injury.     Menstrual Status :   N/A   Weight Measured :   195 lb(Converted to: 195 lb 0 oz, 88.45 kg)    Height Measured :   66 in(Converted to: 5 ft 6 in, 167.64 cm)    Body Mass Index :   31.47 kg/m2 (HI)    Body Surface Area :   2.03 m2   Systolic Blood Pressure :   112 mmHg   Diastolic Blood Pressure :   80 mmHg   Mean Arterial Pressure :   91 mmHg   Peripheral Pulse Rate :   70 bpm   Temperature Tympanic :   97.4 DegF(Converted to: 36.3 DegC)  (LOW)    Lexii Brown - 1/5/2020 8:40 AM CST   Health Status   Allergies Verified? :   Yes   Medication History Verified? :   Yes   Immunizations Current :   Other: Pt. checking work for record.   Medical History Verified? :   Yes   Pre-Visit Planning Status :   Completed   Tobacco Use? :   Never smoker   Lexii Brown - 1/5/2020 8:40 AM CST   Meds / Allergies   (As Of: 1/5/2020 8:45:03 AM CST)   Allergies (Active)   codeine  Estimated Onset Date:   Unspecified ; Reactions:   nausea ; Created By:   Luisa Ramirez MD; Reaction Status:   Active ; Category:   Drug ; Substance:   codeine ; Type:   Allergy ; Updated By:   Luisa Ramirez MD; Reviewed Date:   1/5/2020 8:43 AM CST        Medication List   (As Of: 1/5/2020 8:45:03 AM CST)   Prescription/Discharge Order    lisinopril  :   lisinopril ; Status:   Prescribed ; Ordered As Mnemonic:   lisinopril 40 mg oral tablet ; Simple Display Line:   40 mg, 1 tab(s), po, daily, 90 tab(s), 3 Refill(s) ; Ordering Provider:   Luisa Ramirez MD; Catalog Code:   lisinopril ; Order Dt/Tm:   1/14/2019 8:51:00 AM CST          Miscellaneous Rx Supply  :   Miscellaneous Rx Supply ; Status:   Prescribed ; Ordered As Mnemonic:   CPAP 7 cm water pressure ; Simple Display Line:   See Instructions, Heated humidifier, heated  tubing, filters, nasal or full face mask of choice with headgear.  Replacement cushions and supplies as needed., 1 EA, 11 Refill(s) ; Ordering Provider:   Luisa Ramirez MD; Catalog Code:   Miscellaneous Rx Supply ; Order Dt/Tm:   1/14/2019 8:55:38 AM CST          omeprazole  :   omeprazole ; Status:   Prescribed ; Ordered As Mnemonic:   omeprazole 40 mg oral delayed release capsule ; Simple Display Line:   1 cap(s), Oral, daily, 90 cap(s), 1 Refill(s) ; Ordering Provider:   Aydin Yu MD; Catalog Code:   omeprazole ; Order Dt/Tm:   8/8/2019 12:02:32 PM CDT          omega-3 polyunsaturated fatty acids  :   omega-3 polyunsaturated fatty acids ; Status:   Prescribed ; Ordered As Mnemonic:   omega-3 polyunsaturated fatty acids 1000 mg oral capsule ; Simple Display Line:   2,000 mg, 2 cap(s), Oral, bid, 360 cap(s), 1 Refill(s) ; Ordering Provider:   Aydin Yu MD; Catalog Code:   omega-3 polyunsaturated fatty acids ; Order Dt/Tm:   8/9/2019 1:02:19 PM CDT          albuterol  :   albuterol ; Status:   Prescribed ; Ordered As Mnemonic:   albuterol 90 mcg/inh inhalation aerosol ; Simple Display Line:   2 puff(s), Inhale, q4 hrs, 17 gm, 5 Refill(s) ; Ordering Provider:   Aydin Yu MD; Catalog Code:   albuterol ; Order Dt/Tm:   10/15/2019 3:52:07 PM CDT          formoterol-mometasone  :   formoterol-mometasone ; Status:   Prescribed ; Ordered As Mnemonic:   Dulera 100 mcg-5 mcg/inh inhalation aerosol ; Simple Display Line:   1 puff(s), Inhale, bid, 13 gm, 0 Refill(s) ; Ordering Provider:   Aydin Yu MD; Catalog Code:   formoterol-mometasone ; Order Dt/Tm:   11/26/2019 1:45:38 PM CST            Home Meds    aspirin  :   aspirin ; Status:   Documented ; Ordered As Mnemonic:   aspirin 81 mg oral tablet ; Simple Display Line:   81 mg, 1 tab(s), PO, Daily ; Catalog Code:   aspirin ; Order Dt/Tm:   1/7/2010 1:05:19 PM CST

## 2022-02-16 NOTE — PROGRESS NOTES
Patient:   CANDICE FREY JR            MRN: [id number]            FIN: [id number]               Age:   58 years     Sex:  Male     :  1960   Associated Diagnoses:   Viral URI; Severe obstructive sleep apnea; GERD (Gastroesophageal Reflux Disease); Mixed hyperlipidemia; HTN (Hypertension); Essential familial hyperlipidemia   Author:   Luisa Ramirez MD      Chief Complaint   2019 8:40 AM CST    sore throat x1 week; worse at night; ears feel plugged; labs      History of Present Illness   Chief complaint and symptoms reviewed with patient and confirmed as above. Throat is scratchy, mostly at night and upon wakening. Some nasal congestion. Has a hx of ear infection.  Has YESENIA, well controlled with cpap which he uses regularly and is effective. Will need ongoing supplies for cpap machine.  Also notes that he would like to up date his fasting labs and refills. Would like to update labs and refills prior to retiring at the end of next month. Fasting today      Health Status   Allergies:    Allergic Reactions (All)  Severity Not Documented  Codeine (Nausea)   Medications:  (Selected)   Prescriptions  Prescribed  CPAP 7 cm water pressure: CPAP 7 cm water pressure, See Instructions, Instructions: Heated humidifier, heated tubing, filters, nasal or full face mask of choice with headgear.  Replacement cushions and supplies as needed., Supply, # 1 EA, 11 Refill(s), Type: Maintenance, Heate...  atorvastatin 40 mg oral tablet: 1 tab(s) ( 40 mg ), po, daily, # 90 tab(s), 3 Refill(s), Type: Maintenance, Pharmacy: [facility], 1 tab(s) po daily  lisinopril 40 mg oral tablet: 1 tab(s) ( 40 mg ), po, daily, # 90 tab(s), 3 Refill(s), Type: Maintenance, Pharmacy: [facility], 1 tab(s) po daily  omeprazole 40 mg oral delayed release capsule: 1 cap(s) ( 40 mg ), po, daily, # 90 cap(s), 3 Refill(s), Type: Maintenance, Pharmacy: [id number], Needs appt for further refills, 1 cap(s) po daily  Documented  Medications  Documented  Lovaza: ( 2,000 mg ), po, bid, 0 Refill(s), Type: Maintenance  aspirin 81 mg oral tablet: 1 tab(s) ( 81 mg ), PO, Daily, 0,    Medications          *denotes recorded medication          CPAP 7 cm water pressure: See Instructions, Heated humidifier, heated tubing, filters, nasal or full face mask of choice with headgear.  Replacement cushions and supplies as needed., 1 EA, 11 Refill(s).          *aspirin 81 mg oral tablet: 81 mg, 1 tab(s), PO, Daily.          atorvastatin 40 mg oral tablet: 40 mg, 1 tab(s), po, daily, 90 tab(s), 3 Refill(s).          lisinopril 40 mg oral tablet: 40 mg, 1 tab(s), po, daily, 90 tab(s), 3 Refill(s).          *Lovaza: 2,000 mg, po, bid, 0 Refill(s).          omeprazole 40 mg oral delayed release capsule: 40 mg, 1 cap(s), po, daily, 90 cap(s), 3 Refill(s).     Problem list:    All Problems (Selected)  Severe obstructive sleep apnea / SNOMED CT 721454623 / Confirmed  Obesity / SNOMED CT 9275000152 / Probable  Mixed hyperlipidemia / SNOMED CT 310625886 / Confirmed  HTN (Hypertension) / ICD-9-.9 / Confirmed  GERD (Gastroesophageal Reflux Disease) / ICD-9-.81 / Confirmed  Essential familial hyperlipidemia / ICD-9-.2 / Confirmed  Shift work sleep disorder / SNOMED CT 325383297 / Confirmed  Allergic Rhinitis / ICD-9-.9 / Confirmed      Histories   Past Medical History:    Resolved  Bowel obstruction (SNOMED CT 071067928): Onset in 1989 at 28 years.  Resolved.  Appendectomy (SNOMED CT 721942339): Onset in 1986 at 25 years.  Resolved.   Family History:    Diabetes mellitus  Father  Hypercholesterolemia  Father     Procedure history:    Polysomnography ([id number]) on 3/7/2016 at 55 Years.  Comments:  3/23/2016 2:33 PM CDT - Serena Tristan CMA  AHI: 36.8  Colonoscopy ([id number]) on 10/30/2015 at 55 Years.  Comments:  11/17/2015 4:44 PM GEORGETTE - Debra Liang RN  Sedation: Fentanyl and Midazolam  Indication: Personal history of colon  polyps  Findings: Single tubular adenoma no high grade dysplasia. Diverticula in the sigmoid colon.  F/U: Repeat colonoscopy in 5 years  Colonoscopy ([id number]) on 4/16/2010 at 49 Years.  Comments:  4/30/2012 9:39 AM CDT - Slime Hummel MA  3 polyps removed, 2 were adenoma's  repeat in 5 years  Exploration of abdomen ([id number]) on 7/15/1993 at 32 Years.  Inguinal herniorrhaphy ([id number]) on 1/24/1992 at 31 Years.  Comments:  4/19/2016 7:02 AM Blanca Hughes  Laparoscopic left with mesh.  Appendectomy ([id number]) on 1/21/1986 at 25 Years.  Exploration of abdomen ([id number]) in the month of 2/1960 at 5 Months.  Comments:  4/19/2016 6:56 AM Blanca Hughes  Diverticulosis vs intussusception   Social History:        Alcohol Assessment            Current                     Comments:                      10/02/2012 - Lilly Graves LPN                     Rare      Tobacco Assessment            Never      Substance Abuse Assessment            Never      Employment and Education Assessment            Employed, Work/School description: [employer].      Home and Environment Assessment            Marital status:  (Living together).  Spouse/Partner name: Liana.  Lives with Children, Spouse.  4               children.  Living situation: Home/Independent.      Nutrition and Health Assessment            Type of diet: Regular.      Exercise and Physical Activity Assessment                     Comments:                      10/02/2012 - Lilly Graves LPN                     Active @ work, outdoors.      Sexual Assessment            Sexual orientation: Heterosexual.      Physical Examination   Vital Signs   1/14/2019 8:40 AM CST Temperature Tympanic 97.4 DegF  LOW    Peripheral Pulse Rate 66 bpm    Pulse Site Radial artery    HR Method Manual    Systolic Blood Pressure 110 mmHg    Diastolic Blood Pressure 60 mmHg    Mean Arterial Pressure 77 mmHg    BP Site Left arm    BP Method Manual      Measurements  from flowsheet : Measurements   1/14/2019 8:40 AM CST Height Measured - Standard 66 in    Weight Measured - Standard 190.0 lb    BSA 2 m2    Body Mass Index 30.66 kg/m2  HI      General:  Alert and oriented, No acute distress.    Eye:  Pupils are equal, round and reactive to light, Normal conjunctiva.    HENT:  Normocephalic, Tympanic membranes are clear, Oral mucosa is moist, No pharyngeal erythema, No sinus tenderness.    Neck:  Supple, Non-tender, No lymphadenopathy.    Respiratory:  Lungs are clear to auscultation.    Cardiovascular:  Normal rate, Regular rhythm.       Review / Management   Results review:  Lab results   10/24/2018 11:46 AM CDT Culture Strep A See comment   10/24/2018 11:45 AM CDT Rapid Strep POC Negative    POC Test Comments S/O Culture    POC Test Comments POC Test Comments   .       Impression and Plan   Diagnosis     Viral URI (FBQ28-RI J06.9).     Course:  Progressing as expected.    Plan:  Signs and symptoms and over the counter treatment of upper respiratory infection discussed.  Should resolve over next 5-7 days.  Return to clinic if severe headache, difficulty swallowing, chest pain, or if symptoms become more severe or any other concerns.  Call with questions..    Diagnosis     Severe obstructive sleep apnea (BXW24-RS G47.33).     GERD (Gastroesophageal Reflux Disease) (YME77-PR K21.9).     Mixed hyperlipidemia (QWT47-XQ E78.2).     HTN (Hypertension) (IMR71-OC I10).     Essential familial hyperlipidemia (XDO14-WP E78.49).     Orders     Orders (Selected)   Outpatient Orders  Ordered (In Transit)  Basic Metabolic Panel* ([facility]): Specimen Type: Serum, Collection Date: 01/14/19 8:54:00 CST  Lipid panel with reflex to direct ldl* ([facility]): Specimen Type: Serum, Collection Date: 01/14/19 8:54:00 CST  Prescriptions  Prescribed  CPAP 7 cm water pressure: CPAP 7 cm water pressure, See Instructions, Instructions: Heated humidifier, heated tubing, filters, nasal or full face mask of choice with  headgear.  Replacement cushions and supplies as needed., Supply, # 1 EA, 11 Refill(s), Type: Maintenance  atorvastatin 40 mg oral tablet: = 1 tab(s) ( 40 mg ), po, daily, # 90 tab(s), 3 Refill(s), Type: Maintenance, Pharmacy: [facility] [id number] [facility], hold on file - will contact when refills are needed, 1 tab(s) Oral daily  lisinopril 40 mg oral tablet: = 1 tab(s) ( 40 mg ), po, daily, # 90 tab(s), 3 Refill(s), Type: Maintenance, Pharmacy: [facility] [id number] [facility], hold on file - will contact when refills are needed, 1 tab(s) Oral daily  omeprazole 40 mg oral delayed release capsule: = 1 cap(s) ( 40 mg ), po, daily, # 90 cap(s), 3 Refill(s), Type: Maintenance, Pharmacy: [facility] [id number] [facility], hold on file - will contact when refills are needed, 1 cap(s) Oral daily.

## 2022-02-16 NOTE — NURSING NOTE
Comprehensive Intake Entered On:  5/11/2021 12:57 PM CDT    Performed On:  5/11/2021 12:48 PM CDT by Brittany Tinoco CMA               Summary   Chief Complaint :   Patient presents for possible inflammed skin tags noticed the inflammation yesterday.   Menstrual Status :   N/A   Weight Measured :   204.2 lb(Converted to: 204 lb 3 oz, 92.624 kg)    Height/Length Estimated :   66 in(Converted to: 5 ft 6 in, 167.64 cm)    Systolic Blood Pressure :   130 mmHg   Diastolic Blood Pressure :   70 mmHg   Mean Arterial Pressure :   90 mmHg   Peripheral Pulse Rate :   83 bpm   BP Site :   Right arm   BP Method :   Manual   HR Method :   Electronic   Temperature Tympanic :   97.4 DegF(Converted to: 36.3 DegC)  (LOW)    Oxygen Saturation :   97 %   Brittany Tincoo CMA - 5/11/2021 12:48 PM CDT   Health Status   Allergies Verified? :   Yes   Medication History Verified? :   Yes   Immunizations Current :   Other: Pt. checking work for record.   Tobacco Use? :   Never smoker   Brittany Tinoco CMA - 5/11/2021 12:48 PM CDT   Consents   Consent for Immunization Exchange :   Consent Granted   Consent for Immunizations to Providers :   Consent Granted   Brittany Tinoco CMA - 5/11/2021 12:48 PM CDT   Meds / Allergies   (As Of: 5/11/2021 12:57:32 PM CDT)   Allergies (Active)   codeine  Estimated Onset Date:   Unspecified ; Reactions:   nausea ; Created By:   Luisa Ramirez MD; Reaction Status:   Active ; Category:   Drug ; Substance:   codeine ; Type:   Allergy ; Updated By:   Luisa Ramirez MD; Reviewed Date:   5/11/2021 12:54 PM CDT      lisinopril  Estimated Onset Date:   Unspecified ; Reactions:   Hives ; Created By:   Aydin Yu MD; Reaction Status:   Active ; Category:   Drug ; Substance:   lisinopril ; Type:   Allergy ; Severity:   Severe ; Updated By:   Aydin Yu MD; Reviewed Date:   5/11/2021 12:54 PM CDT        Medication List   (As Of: 5/11/2021 12:57:32 PM CDT)   Prescription/Discharge Order     albuterol  :   albuterol ; Status:   Prescribed ; Ordered As Mnemonic:   albuterol 90 mcg/inh inhalation aerosol ; Simple Display Line:   2 puff(s), Inhale, q4 hrs, 3 EA, 3 Refill(s) ; Ordering Provider:   Aydin Yu MD; Catalog Code:   albuterol ; Order Dt/Tm:   6/9/2020 9:25:48 AM CDT          fluticasone-salmeterol  :   fluticasone-salmeterol ; Status:   Prescribed ; Ordered As Mnemonic:   AirDuo RespiClick 113 mcg-14 mcg/inh inhalation powder ; Simple Display Line:   See Instructions, Inhale 1 puff by mouth bid;   rinse mouth and throat after use, 3 EA, 1 Refill(s) ; Ordering Provider:   Aydin Yu MD; Catalog Code:   fluticasone-salmeterol ; Order Dt/Tm:   2/9/2021 2:08:22 PM CST          indomethacin  :   indomethacin ; Status:   Prescribed ; Ordered As Mnemonic:   indomethacin 50 mg oral capsule ; Simple Display Line:   50 mg, 1 cap(s), Oral, tid, for 7 day(s), PRN: for arthritis, 30 cap(s), 1 Refill(s) ; Ordering Provider:   Janice Guillory PA-C; Catalog Code:   indomethacin ; Order Dt/Tm:   1/5/2020 8:55:37 AM CST          losartan  :   losartan ; Status:   Prescribed ; Ordered As Mnemonic:   losartan 100 mg oral tablet ; Simple Display Line:   100 mg, 1 tab(s), Oral, daily, 90 tab(s), 3 Refill(s) ; Ordering Provider:   Aydin Yu MD; Catalog Code:   losartan ; Order Dt/Tm:   3/30/2021 1:40:05 PM CDT          Miscellaneous Rx Supply  :   Miscellaneous Rx Supply ; Status:   Prescribed ; Ordered As Mnemonic:   CPAP 7 cm water pressure ; Simple Display Line:   See Instructions, Heated humidifier, heated tubing, filters, nasal or full face mask of choice with headgear.  Replacement cushions and supplies as needed., 1 EA, 11 Refill(s) ; Ordering Provider:   James FLORES [facility]; Catalog Code:   Miscellaneous Rx Supply ; Order Dt/Tm:   1/14/2019 8:55:38 AM CST          omeprazole  :   omeprazole ; Status:   Prescribed ; Ordered As Mnemonic:   omeprazole 40 mg oral delayed  release capsule ; Simple Display Line:   1 cap(s), Oral, daily, 90 cap(s), 3 Refill(s) ; Ordering Provider:   Aydin Yu MD; Catalog Code:   omeprazole ; Order Dt/Tm:   6/9/2020 9:27:15 AM CDT          predniSONE  :   predniSONE ; Status:   Processing ; Ordered As Mnemonic:   predniSONE 10 mg oral tablet ; Simple Display Line:   See Instructions, 4 tabs daily for 4 days then 3 tabs daily for 4 days then 2 tabs daily for 4 days then 1 tab daily for 4 days then 1/2 tab daily for 4 days, 42 tab(s), 0 Refill(s) ; Ordering Provider:   Zully Mclaughlin MD; Action Display:   Discontinue ; Catalog Code:   predniSONE ; Order Dt/Tm:   5/11/2021 12:55:08 PM CDT          rosuvastatin  :   rosuvastatin ; Status:   Prescribed ; Ordered As Mnemonic:   rosuvastatin 20 mg oral tablet ; Simple Display Line:   20 mg, 1 tab(s), Oral, daily, 90 tab(s), 3 Refill(s) ; Ordering Provider:   Aydin Yu MD; Catalog Code:   rosuvastatin ; Order Dt/Tm:   6/9/2020 9:27:34 AM CDT            Home Meds    aspirin  :   aspirin ; Status:   Documented ; Ordered As Mnemonic:   aspirin 81 mg oral tablet ; Simple Display Line:   81 mg, 1 tab(s), PO, Daily ; Catalog Code:   aspirin ; Order Dt/Tm:   1/7/2010 1:05:19 PM CST          EPINEPHrine  :   EPINEPHrine ; Status:   Documented ; Ordered As Mnemonic:   EPINEPHrine 0.3 mg injectable kit ; Simple Display Line:   PRN: for anaphylaxis, 0 Refill(s) ; Catalog Code:   EPINEPHrine ; Order Dt/Tm:   3/30/2021 1:26:24 PM CDT          triamcinolone topical  :   triamcinolone topical ; Status:   Documented ; Ordered As Mnemonic:   triamcinolone 0.1% topical cream ; Simple Display Line:   PRN: for itching, 0 Refill(s) ; Catalog Code:   triamcinolone topical ; Order Dt/Tm:   3/30/2021 1:26:24 PM CDT            ID Risk Screen   Recent Travel History :   No recent travel   Family Member Travel History :   No recent travel   Other Exposure to Infectious Disease :   Unknown   COVID-19 Testing Status  :   No positive COVID-19 test   Brittany Tinoco CMA - 5/11/2021 12:48 PM CDT

## 2022-02-16 NOTE — PROGRESS NOTES
Patient:   CANDICE FREY JR            MRN: [id number]            FIN: [id number]               Age:   56 years     Sex:  Male     :  1960   Associated Diagnoses:   None   Author:   Aydin Yu MD       -   Today's date:  2017 10:47:00 AM .        -   To whom it may concern:        This patient is currently under my care and Was seen in my office on  2017.  .     Please excuse him/ her from work, From -2017.  He/ she may return to work, on  3/1/2017, without restrictions.  Follow up as needed   Please contact me if you have any questions or concerns.      -   Sincerely,             Aydin Yu MD  [address]  [address] [phone]

## 2022-02-16 NOTE — PROGRESS NOTES
Patient:   CANDICE FREY JR            MRN: [id number]            FIN: [id number]               Age:   59 years     Sex:  Male     :  1960   Associated Diagnoses:   Wheeze   Author:   Aydin Yu MD      Chief Complaint   2019 1:32 PM CST   follow-up cough; feels like it is coming back; dry cough     Chief complaint and symptoms noted above confirmed with patient.      History of Present Illness             The patient presents with wheezing.  The wheezing is characterized by expiratory.  The severity of the wheezing is moderate.  The timing/course of symptom(s) associated to wheezing is constant.  since URI.  Exacerbating factors consist of cold weather.  Relieving factors consist of beta-agonist and prednisone.  Associated symptoms consist of none.        Review of Systems   Constitutional:  Negative.    Respiratory:  Negative except as documented in history of present illness.       Health Status   Allergies:    Allergic Reactions (Selected)  Severity Not Documented  Codeine (Nausea)   Problem list:    All Problems (Selected)  Allergic Rhinitis / ICD-9-.9 / Confirmed  Essential familial hyperlipidemia / ICD-9-.2 / Confirmed  GERD (Gastroesophageal Reflux Disease) / ICD-9-.81 / Confirmed  HTN (Hypertension) / ICD-9-.9 / Confirmed  Mixed hyperlipidemia / SNOMED CT 780058392 / Confirmed  Obesity / SNOMED CT 4628455166 / Probable  Severe obstructive sleep apnea / SNOMED CT 189156024 / Confirmed  Shift work sleep disorder / SNOMED CT 167408923 / Confirmed      Histories   Past Medical History:    Resolved  Bowel obstruction (SNOMED CT 556163065): Onset in  at 28 years.  Resolved.  Appendectomy (SNOMED CT 596770867): Onset in  at 25 years.  Resolved.      Physical Examination   Vital Signs   2019 1:32 PM CST Peripheral Pulse Rate 97 bpm    Systolic Blood Pressure 116 mmHg    Diastolic Blood Pressure 62 mmHg    Mean Arterial Pressure 80 mmHg    Oxygen  Saturation 94 %      General:  Alert and oriented, No acute distress.    Neck:  Supple, Non-tender.    Respiratory:  Respirations are non-labored,      Breath sounds: Expiratory wheezes.    Cardiovascular:  Normal rate, Regular rhythm, No murmur.       Impression and Plan   Diagnosis     Wheeze (PNI28-JN R06.2).     Orders     Orders   Pharmacy:  Dulera 100 mcg-5 mcg/inh inhalation aerosol (Prescribe): 1 puff(s), Inhale, bid, # 13 gm, 0 Refill(s), Type: Maintenance, patient has supply at home (Rx).     Orders     F/U in 48-72 hours if not improving, sooner if getting worse.

## 2022-02-16 NOTE — PROGRESS NOTES
Patient:   CANDICE FREY JR            MRN: [id number]            FIN: [id number]               Age:   58 years     Sex:  Male     :  1960   Associated Diagnoses:   Right acute suppurative otitis media   Author:   Aydin Yu MD      Visit Information      Date of Service: 10/24/2018 11:20 am  Performing Location: [facility]  Encounter#: [id number]      Primary Care Provider (PCP):  Aydin Yu MD    NPI# [id number]      Referring Provider:  Aydin Yu MD    NPI# [id number]      Chief Complaint   10/24/2018 11:31 AM CDT  Sore throat and pain in ears x 1 day        History of Present Illness             The patient presents with earache.  The location of the earache is the right ear.  The earache is characterized by pain.  The severity of the earache is moderate.  The earache is constant.  The earache has lasted for 2 day(s).  Associated symptoms consist of denies cough, denies dental pain and denies facial pain.        Review of Systems   Constitutional:  Negative except as documented in history of present illness.    Ear/Nose/Mouth/Throat:  Negative except as documented in history of present illness.    Respiratory:  Negative.    Cardiovascular:  Negative.       Health Status   Allergies:    Allergic Reactions (Selected)  Severity Not Documented  Codeine (Nausea)   Medications:  (Selected)   Prescriptions  Prescribed  CPAP 7 cm water pressure: CPAP 7 cm water pressure, See Instructions, Instructions: Heated humidifier, heated tubing, filters, nasal or full face mask of choice with headgear.  Replacement cushions and supplies as needed., Supply, # 1 EA, 11 Refill(s), Type: Maintenance, Heate...  atorvastatin 40 mg oral tablet: 1 tab(s) ( 40 mg ), po, daily, # 90 tab(s), 3 Refill(s), Type: Maintenance, Pharmacy: [facility] [id number] [facility], 1 tab(s) po daily  lisinopril 40 mg oral tablet: 1 tab(s) ( 40 mg ), po, daily, # 90 tab(s), 3 Refill(s), Type: Maintenance, Pharmacy:  [facility] [id number] [facility], 1 tab(s) po daily  omeprazole 40 mg oral delayed release capsule: 1 cap(s) ( 40 mg ), po, daily, # 90 cap(s), 3 Refill(s), Type: Maintenance, [id number], Needs appt for further refills, 1 cap(s) po daily  Documented Medications  Documented  Lovaza: ( 2,000 mg ), po, bid, 0 Refill(s), Type: Maintenance  aspirin 81 mg oral tablet: 1 tab(s) ( 81 mg ), PO, Daily, 0   Problem list:    All Problems (Selected)  Allergic Rhinitis / ICD-9-.9 / Confirmed  Shift work sleep disorder / SNOMED CT 883769499 / Confirmed  Essential familial hyperlipidemia / ICD-9-.2 / Confirmed  GERD (Gastroesophageal Reflux Disease) / ICD-9-.81 / Confirmed  HTN (Hypertension) / ICD-9-.9 / Confirmed  Mixed hyperlipidemia / SNOMED CT 492841829 / Confirmed  Obesity / SNOMED CT 4432123159 / Probable  Severe obstructive sleep apnea / SNOMED CT 756395917 / Confirmed      Histories   Past Medical History:    Resolved  Bowel obstruction (SNOMED CT 988145272): Onset in 1989 at 28 years.  Resolved.  Appendectomy (SNOMED CT 235313812): Onset in 1986 at 25 years.  Resolved.   Family History:    Diabetes mellitus  Father  Hypercholesterolemia  Father     Procedure history:    Polysomnography (SNOMED CT 035037118) on 3/7/2016 at 55 Years.  Comments:  3/23/2016 2:33 PM - Serena Tristan CMA  AHI: 36.8  Colonoscopy (SNOMED CT 058067145) performed by Stef Herring MD on 10/30/2015 at 55 Years.  Comments:  11/17/2015 4:44 PM - Debra Liang RN  Sedation: Fentanyl and Midazolam  Indication: Personal history of colon polyps  Findings: Single tubular adenoma no high grade dysplasia. Diverticula in the sigmoid colon.  F/U: Repeat colonoscopy in 5 years  Colonoscopy (SNOMED CT 080149492) on 4/16/2010 at 49 Years.  Comments:  4/30/2012 9:39 AM - Slime Hummel MA  3 polyps removed, 2 were adenoma's  repeat in 5 years  Exploration of abdomen (SNOMED CT 572393898) performed by Ricky Figueroa DO on  7/15/1993 at 32 Years.  Inguinal herniorrhaphy (SNOMED CT 22162241) performed by Be Galvez MD on 1/24/1992 at 31 Years.  Comments:  4/19/2016 7:02 AM - Blanca Reeves  Laparoscopic left with mesh.  Appendectomy (SNOMED CT 907671281) performed by Ramos Wilson MD on 1/21/1986 at 25 Years.  Exploration of abdomen (SNOMED CT 937090375) in the month of 2/1960 at 5 Months.  Comments:  4/19/2016 6:56 AM - Blanca Reeves  Diverticulosis vs intussusception   Social History:        Alcohol Assessment            Current                     Comments:                      10/02/2012 - Lilly Graves LPN                     Rare      Tobacco Assessment            Never      Substance Abuse Assessment            Never      Employment and Education Assessment            Employed, Work/School description: [employer]      Home and Environment Assessment            Marital status:  (Living together).  Spouse/Partner name: Liana.  Lives with Children, Spouse.  4               children.  Living situation: Home/Independent.      Nutrition and Health Assessment            Type of diet: Regular.      Exercise and Physical Activity Assessment                     Comments:                      10/02/2012 - Lilly Graves LPN                     Active @ work, outdoors.      Sexual Assessment            Sexual orientation: Heterosexual.        Physical Examination   Vital Signs   10/24/2018 11:31 AM CDT Peripheral Pulse Rate 68 bpm    Pulse Site Radial artery    HR Method Manual    Systolic Blood Pressure 130 mmHg    Diastolic Blood Pressure 80 mmHg    Mean Arterial Pressure 97 mmHg    BP Site Right arm    BP Method Manual      General:  Alert and oriented, No acute distress.    Eye:  Normal conjunctiva.    HENT:  Normocephalic.         Ear: Right ear, Tympanic membrane ( Erythematous, Fluid in middle ear ).    Neck:  Supple, Non-tender.    Respiratory:  Lungs are clear to auscultation, Respirations are  non-labored.    Cardiovascular:  Normal rate, Regular rhythm, No murmur.       Impression and Plan   Diagnosis     Right acute suppurative otitis media (TIU20-OG H66.001).     Course:  Progressing as expected.    Orders     Orders   Pharmacy:  azithromycin 250 mg oral tablet (Prescribe): = 1 packet(s), PO, Once, Instructions: as directed on package labeling, # 6 tab(s), 0 Refill(s), Type: Soft Stop, Pharmacy: [facility] [id number] [facility], 1 packet(s) Oral once,Instr:as directed on package labeling.     Orders     F/U in 48-72 hours if not improving, sooner if getting worse.

## 2022-02-16 NOTE — LETTER
(Inserted Image. Unable to display)   May 22, 2019      CANDICE SHANTE   [address] [place], [place] [id number]        Dear CANDICE,      Thank you for selecting [facility] ([facility]) for your healthcare needs.     Our records indicate you are due for the following services:     Fasting Lab Tests ~ Please do not eat or drink anything 10 hours prior to your scheduled appointment time.                (Water and any medications that you may need are allowed unless directed otherwise.)    If you had your labs done at another facility or with [facility] at [facility], please bring in a copy of the results to your next visit, mail a copy, or drop off a copy of your results to your Healthcare Provider.    To schedule an appointment or if you have further questions, please contact your primary clinic:   [facility]          [phone]   Formerly [facility]    [phone]             [facility]         [phone]      Powered by lark    Sincerely,    Aydin Yu M.D.

## 2022-02-16 NOTE — PROGRESS NOTES
Chief Complaint    left elbow discomfort x 2wks - worse in the am, decreased strength - difficulty picking up a cup of coffee, phone - pain with extension.  will have some radiating pain up into shoulder and forearm  History of Present Illness      R hand dominant male, presents with several week hx of LUE pain, localized to the L lateral elbow, with radiation to the shoulder and forearm at times.  Denies any T/N but has noted weakness preceded by pain such as lifting cup.  no trauma. no new or different activities or repeative activity.  He is working 1x per week driving. can not recall any other provicating factors.   Review of Systems      Review of systems is negative with the exception of those noted in HPI   Physical Exam   Vitals & Measurements    T: 98.6  F (Tympanic)  HR: 80 (Peripheral)  BP: 128/81  SpO2: 96%     HT: 66 in  HT: 66 in  WT: 202.5 lb  BMI: 32.68       nad appears well      exam of the L UE reveals no erythema, swelling or atrophy.       sensation and peripheral pulses intact. cap refill brisk      full AROM at the elbow and wrist.  pain wiht resistive wrist extension and palpation of the lateral epicondyle.       tinnel tap at the ulnar and radial groove negative  Assessment/Plan       Lateral epicondylitis, left elbow (M77.12)         ice, voltaren cream.  PT referral.  FU with pcp for persistent or worseing sx.  may use tennis elbow brace if needed.          Ordered:          Physical Therapy (Request), Lateral epicondylitis, left elbow           Patient Information     Name:CANDICE FREY JR      Address:      [address] [id number]     Sex:Male     YOB: 1960     [phone]     Emergency Contact:IRAIS FREY     MRN:[id number]     FIN:[id number]     Location:[facility]     Date of Service:11/04/2021      Primary Care Physician:       Aydin Yu MD, [phone]      Attending Physician:       Pastora LINDSEY, Janice FERNANDES, [phone]  Problem List/Past Medical History    Ongoing     Allergic Rhinitis     Essential familial hyperlipidemia     GERD (Gastroesophageal Reflux Disease)     HTN (Hypertension)     Mixed hyperlipidemia     Obesity     Severe obstructive sleep apnea       Comments: Optimal CPAP titration to 7 cm water pressure on 4/1/2016 HST with AHI 36 and oximetry ankit 80% on 3/7/16     Shift work sleep disorder    Historical     Appendectomy     Bowel obstruction  Procedure/Surgical History     Polysomnography (03/07/2016)      Comments: AHI: 36.8.     Colonoscopy (10/30/2015)      Comments: Sedation: Fentanyl and Midazolam      Indication: Personal history of colon polyps      Findings: Single tubular adenoma no high grade dysplasia. Diverticula in the sigmoid colon.      F/U: Repeat colonoscopy in 5 years.     Colonoscopy (04/16/2010)      Comments: 3 polyps removed, 2 were adenoma's  repeat in 5 years.     Exploration of abdomen (07/15/1993)     Inguinal herniorrhaphy (01/24/1992)      Comments: Laparoscopic left with mesh..     Appendectomy (01/21/1986)     Exploration of abdomen (02/1960)      Comments: Diverticulosis vs intussusception.  Medications    AirDuo RespiClick 113 mcg-14 mcg/inh inhalation powder, See Instructions, 1 refills    albuterol 90 mcg/inh inhalation aerosol, 2 puff(s), Inhale, q4 hrs, 3 refills    aspirin 81 mg oral tablet, 81 mg= 1 tab(s), Oral, daily    CPAP 7 cm water pressure, See Instructions, 11 refills    EPINEPHrine 0.3 mg injectable kit, PRN    indomethacin 50 mg oral capsule, 50 mg= 1 cap(s), Oral, tid, PRN, 1 refills    losartan 100 mg oral tablet, 100 mg= 1 tab(s), Oral, daily, 3 refills    omeprazole 40 mg oral delayed release capsule, 1 cap(s), Oral, daily    Replacement Auto Titrating CPAP 6-16 for daily use, See Instructions, 11 refills    rosuvastatin 20 mg oral tablet, 1 tab(s), Oral, daily    triamcinolone 0.1% topical cream, PRN    Vitamin D3, daily    ZyrTEC 10 mg oral tablet,  10 mg= 1 tab(s), Oral, daily  Allergies    lisinopril (Hives)    codeine (nausea)  Social History    Smoking Status     Never smoker     Alcohol      Current     Electronic Cigarette/Vaping      Electronic Cigarette Use: Never.     Employment/School      Employed, Work/School description: [employer]     Exercise     Home/Environment      Marital status:  (Living together). Spouse/Partner name: Liana. Lives with Children, Spouse. 4 children. Living situation: Home/Independent.     Nutrition/Health      Type of diet: Regular.     Sexual      Sexual orientation: Heterosexual.     Substance Abuse      Never     Tobacco      Never (less than 100 in lifetime)  Family History    Diabetes mellitus: Father.    Hypercholesterolemia: Father.  Lab Results       Lab Results (Last 4 results within 90 days)        Sodium Level: 140 mmol/L [135 mmol/L - 146 mmol/L] (10/05/21 14:21:00)       Potassium Level: 4.2 mmol/L [3.5 mmol/L - 5.3 mmol/L] (10/05/21 14:21:00)       Chloride Level: 105 mmol/L [98 mmol/L - 110 mmol/L] (10/05/21 14:21:00)       CO2 Level: 25 mmol/L [20 mmol/L - 32 mmol/L] (10/05/21 14:21:00)       Glucose Level: 111 mg/dL High [65 mg/dL - 99 mg/dL] (10/05/21 14:21:00)       BUN: 12 mg/dL [7 mg/dL - 25 mg/dL] (10/05/21 14:21:00)       Creatinine Level: 1.04 mg/dL [0.7 mg/dL - 1.25 mg/dL] (10/05/21 14:21:00)       BUN/Creat Ratio: NOT APPLICABLE [6  - 22] (10/05/21 14:21:00)       eGFR: 77 mL/min/1.73m2 (10/05/21 14:21:00)       eGFR : 89 mL/min/1.73m2 (10/05/21 14:21:00)       Calcium Level: 10.4 mg/dL High [8.6 mg/dL - 10.3 mg/dL] (10/05/21 14:21:00)       Bilirubin Total: 0.4 mg/dL [0.2 mg/dL - 1.2 mg/dL] (10/05/21 14:21:00)       Alkaline Phosphatase: 95 unit/L [35 unit/L - 144 unit/L] (10/05/21 14:21:00)       AST/SGOT: 25 unit/L [10 unit/L - 35 unit/L] (10/05/21 14:21:00)       ALT/SGPT: 40 unit/L [9 unit/L - 46 unit/L] (10/05/21 14:21:00)       Protein Total: 7 gm/dL [6.1 gm/dL  - 8.1 gm/dL] (10/05/21 14:21:00)       Albumin Level: 4.6 gm/dL [3.6 gm/dL - 5.1 gm/dL] (10/05/21 14:21:00)       Globulin: 2.4 [1.9  - 3.7] (10/05/21 14:21:00)       A/G Ratio: 1.9 [1  - 2.5] (10/05/21 14:21:00)       Vitamin D, 1, 25 Dihydroxy Total: 50 pg/mL [18 pg/mL - 72 pg/mL] (10/05/21 14:21:00)       Vitamin D2, 1, 25 Dihydroxy: <8 (10/05/21 14:21:00)       Vitamin D3, 1, 25 Dihydroxy: 50 pg/mL (10/05/21 14:21:00)       TSH: 2.67 mIU/L [0.4 mIU/L - 4.5 mIU/L] (10/05/21 14:21:00)       Tryptase: 7.5 mcg/L (10/05/21 14:21:00)       WBC: 8.4 [3.8  - 10.8] (10/05/21 14:21:00)       RBC: 5.03 [4.2  - 5.8] (10/05/21 14:21:00)       Hgb: 14.5 gm/dL [13.2 gm/dL - 17.1 gm/dL] (10/05/21 14:21:00)       Hct: 42 % [38.5 % - 50 %] (10/05/21 14:21:00)       MCV: 83.5 fL [80 fL - 100 fL] (10/05/21 14:21:00)       MCH: 28.8 pg [27 pg - 33 pg] (10/05/21 14:21:00)       MCHC: 34.5 gm/dL [32 gm/dL - 36 gm/dL] (10/05/21 14:21:00)       RDW: 13.4 % [11 % - 15 %] (10/05/21 14:21:00)       Platelet: 263 [140  - 400] (10/05/21 14:21:00)       MPV: 11.1 fL [7.5 fL - 12.5 fL] (10/05/21 14:21:00)       Sed Rate: 11 (10/05/21 14:21:00)  Immunizations       Scheduled Immunizations       Dose Date(s)       influenza virus vaccine, inactivated       11/01/2013, 11/07/2002, 11/19/2009       Other Immunizations               tetanus/diphth/pertuss (Tdap) adult/adol       02/04/2015

## 2022-02-16 NOTE — PROGRESS NOTES
Chief Complaint    CPAP needs new supplies and change mask type.  History of Present Illness       Patient is here to discuss sleep apnea.       Patient was diagnosed with severe obstructive sleep apnea by polysomnogram March 29, 2016 and a home study March 7, 2016.  He says he really never like the idea of using the CPAP he did it a few times had a little problem and just never worked through it.  Over the last year especially he knows he sleepy during the day and he struggles more and he realizes he really needs to give it a better try       From when he was diagnosed he has gained some weight.  There is no trouble breathing through his nose he has not had allergies.  No significant alcohol use  Review of Systems       Positive for daytime sleepiness, nonrestorative sleep, and morning headaches  Physical Exam   Vitals & Measurements    T: 97.3  F (Tympanic)  HR: 74 (Peripheral)  BP: 130/76  SpO2: 99%     HT: 66 in  WT: 197 lb        Alert and oriented       Supple neck       Normal respirations  Assessment/Plan       1. Severe obstructive sleep apnea (G47.33)          He has known severe obstructive sleep apnea with daytime sleepiness and hypertension he would benefit from repeating trial of CPAP I recommend auto titrating of limb through mask types he like to try a full face which I think would be appropriate       Orders:         Miscellaneous Rx Supply, Replacement Auto Titrating CPAP 6-16 for daily use, See Instructions, Instructions: Heated humidifier x1; Humidifier chamber x1; Heated tubing x1; Full face mask of choice with headgear x1; Cushion x 1; Filters: Disposable x1pk & Reusable x1pk. Le..., (Ordered)  Patient Information     Name:SHANTE JR CANDICE LARIOS      Address:      [address] [id number]     Sex:Male     YOB: 1960     [phone]     Emergency Contact:IRAIS FREY     MRN:[id number]     FIN:[id number]     Location:[facility]     Date of  Service:09/09/2021      Primary Care Physician:       Aydin Yu MD, [phone]      Attending Physician:       Rafael Arechiga MD, [phone]  Problem List/Past Medical History    Ongoing     Allergic Rhinitis     Essential familial hyperlipidemia     GERD (Gastroesophageal Reflux Disease)     HTN (Hypertension)     Mixed hyperlipidemia     Obesity     Severe obstructive sleep apnea       Comments: Optimal CPAP titration to 7 cm water pressure on 4/1/2016 HST with AHI 36 and oximetry ankit 80% on 3/7/16     Shift work sleep disorder    Historical     Appendectomy     Bowel obstruction  Procedure/Surgical History     Polysomnography (03/07/2016)      Comments: AHI: 36.8.     Colonoscopy (10/30/2015)      Comments: Sedation: Fentanyl and Midazolam      Indication: Personal history of colon polyps      Findings: Single tubular adenoma no high grade dysplasia. Diverticula in the sigmoid colon.      F/U: Repeat colonoscopy in 5 years.     Colonoscopy (04/16/2010)      Comments: 3 polyps removed, 2 were adenoma's  repeat in 5 years.     Exploration of abdomen (07/15/1993)     Inguinal herniorrhaphy (01/24/1992)      Comments: Laparoscopic left with mesh..     Appendectomy (01/21/1986)     Exploration of abdomen (02/1960)      Comments: Diverticulosis vs intussusception.  Medications    AirDuo RespiClick 113 mcg-14 mcg/inh inhalation powder, See Instructions, 1 refills    albuterol 90 mcg/inh inhalation aerosol, 2 puff(s), Inhale, q4 hrs, 3 refills    aspirin 81 mg oral tablet, 81 mg= 1 tab(s), Oral, daily    CPAP 7 cm water pressure, See Instructions, 11 refills    EPINEPHrine 0.3 mg injectable kit, PRN    indomethacin 50 mg oral capsule, 50 mg= 1 cap(s), Oral, tid, PRN, 1 refills    losartan 100 mg oral tablet, 100 mg= 1 tab(s), Oral, daily, 3 refills    omeprazole 40 mg oral delayed release capsule, 1 cap(s), Oral, daily    Replacement Auto Titrating CPAP 6-16 for daily use, See Instructions, 11  refills    rosuvastatin 20 mg oral tablet, 1 tab(s), Oral, daily    triamcinolone 0.1% topical cream, PRN  Allergies    lisinopril (Hives)    codeine (nausea)  Social History    Smoking Status     Never smoker     Alcohol      Current     Electronic Cigarette/Vaping      Electronic Cigarette Use: Never.     Employment/School      Employed, Work/School description: [employer]     Exercise     Home/Environment      Marital status:  (Living together). Spouse/Partner name: Liana. Lives with Children, Spouse. 4 children. Living situation: Home/Independent.     Nutrition/Health      Type of diet: Regular.     Sexual      Sexual orientation: Heterosexual.     Substance Abuse      Never     Tobacco      Never (less than 100 in lifetime)  Family History    Diabetes mellitus: Father.    Hypercholesterolemia: Father.  Lab Results          Lab Results (Last 4 results within 90 days)           Sodium Level: 140 mmol/L [135 mmol/L - 146 mmol/L] (07/30/21 08:08:00)          Potassium Level: 4.3 mmol/L [3.5 mmol/L - 5.3 mmol/L] (07/30/21 08:08:00)          Chloride Level: 105 mmol/L [98 mmol/L - 110 mmol/L] (07/30/21 08:08:00)          CO2 Level: 28 mmol/L [20 mmol/L - 32 mmol/L] (07/30/21 08:08:00)          Glucose Level: 101 mg/dL High [65 mg/dL - 99 mg/dL] (07/30/21 08:08:00)          BUN: 17 mg/dL [7 mg/dL - 25 mg/dL] (07/30/21 08:08:00)          Creatinine Level: 0.96 mg/dL [0.7 mg/dL - 1.25 mg/dL] (07/30/21 08:08:00)          BUN/Creat Ratio: NOT APPLICABLE [6  - 22] (07/30/21 08:08:00)          eGFR: 85 mL/min/1.73m2 (07/30/21 08:08:00)          eGFR African American: 98 mL/min/1.73m2 (07/30/21 08:08:00)          Calcium Level: 10.1 mg/dL [8.6 mg/dL - 10.3 mg/dL] (07/30/21 08:08:00)          Bilirubin Total: 0.4 mg/dL [0.2 mg/dL - 1.2 mg/dL] (07/30/21 08:08:00)          Alkaline Phosphatase: 80 unit/L [35 unit/L - 144 unit/L] (07/30/21 08:08:00)          AST/SGOT: 26 unit/L [10 unit/L - 35 unit/L] (07/30/21  08:08:00)          ALT/SGPT: 41 unit/L [9 unit/L - 46 unit/L] (07/30/21 08:08:00)          Protein Total: 6.9 gm/dL [6.1 gm/dL - 8.1 gm/dL] (07/30/21 08:08:00)          Albumin Level: 4.7 gm/dL [3.6 gm/dL - 5.1 gm/dL] (07/30/21 08:08:00)          Globulin: 2.2 [1.9  - 3.7] (07/30/21 08:08:00)          A/G Ratio: 2.1 [1  - 2.5] (07/30/21 08:08:00)          Cholesterol: 179 mg/dL (07/30/21 08:08:00)          Non-HDL Cholesterol: 145 High (07/30/21 08:08:00)          HDL: 34 mg/dL Low (07/30/21 08:08:00)          Cholesterol/HDL Ratio: 5.3 High (07/30/21 08:08:00)          LDL: See comment (07/30/21 08:08:00)          LDL Direct: 79 mg/dL (07/30/21 08:08:00)          Triglyceride: 555 mg/dL High (07/30/21 08:08:00)  Immunizations          Scheduled Immunizations          Dose Date(s)          influenza virus vaccine, inactivated          11/01/2013, 11/07/2002, 11/19/2009          Other Immunizations          tetanus/diphth/pertuss (Tdap) adult/adol          02/04/2015

## 2022-02-16 NOTE — NURSING NOTE
Comprehensive Intake Entered On:  11/4/2021 12:20 PM CDT    Performed On:  11/4/2021 12:17 PM CDT by Shyla Newton CMA               Summary   Chief Complaint :   left elbow discomfort x 2wks - worse in the am, decreased strength - difficulty picking up a cup of coffee, phone - pain with extension.  will have some radiating pain up into shoulder and forearm   Menstrual Status :   N/A   Weight Measured :   202.5 lb(Converted to: 202 lb 8 oz, 91.852 kg)    Height Measured :   66 in(Converted to: 5 ft 6 in, 167.64 cm)    Body Mass Index :   32.68 kg/m2 (HI)    Body Surface Area :   2.07 m2   Height/Length Estimated :   66 in(Converted to: 5 ft 6 in, 167.64 cm)    Systolic Blood Pressure :   128 mmHg   Diastolic Blood Pressure :   81 mmHg (HI)    Mean Arterial Pressure :   97 mmHg   Peripheral Pulse Rate :   80 bpm   BP Site :   Right arm   Temperature Tympanic :   98.6 DegF(Converted to: 37.0 DegC)    Oxygen Saturation :   96 %   Shyla Newton CMA - 11/4/2021 12:17 PM CDT   Health Status   Allergies Verified? :   Yes   Medication History Verified? :   Yes   Immunizations Current :   Other: Pt. checking work for record.   Medical History Verified? :   Yes   Pre-Visit Planning Status :   Completed   Tobacco Use? :   Never smoker   Shyla Newton CMA - 11/4/2021 12:17 PM CDT   Consents   Consent for Immunization Exchange :   Consent Granted   Consent for Immunizations to Providers :   Consent Granted   Shyla Newton CMA - 11/4/2021 12:17 PM CDT   Social History   Social History   (As Of: 11/4/2021 12:20:31 PM CDT)   Alcohol:        Current   Comments:  10/2/2012 10:12 AM - Lilly Graves LPN: Rare   (Last Updated: 10/2/2012 10:12:11 AM CDT by Lilly Graves LPN)          Tobacco:        Never (less than 100 in lifetime)   (Last Updated: 12/21/2020 11:06:02 AM CST by Lotus Connell CMA)          Electronic Cigarette/Vaping:        Electronic Cigarette Use: Never.   (Last Updated: 12/21/2020 11:05:58 AM CST by Becki DIAZ  [employer])          Substance Abuse:        Never   (Last Updated: 10/2/2012 10:12:22 AM CDT by Lilly Graves LPN)          Employment/School:        Employed, Work/School description: [employer]   (Last Updated: 10/2/2012 10:12:43 AM CDT by Lilly Graves LPN)          Home/Environment:        Marital status:  (Living together).  Spouse/Partner name: Liana.  Lives with Children, Spouse.  4 children.  Living situation: Home/Independent.   (Last Updated: 10/2/2012 10:13:09 AM CDT by Lilly Graves LPN)          Nutrition/Health:        Type of diet: Regular.   (Last Updated: 10/2/2012 10:13:15 AM CDT by Lilly Graves LPN)          Exercise:         Comments:  10/2/2012 10:13 AM - Lilly Gravse LPN: Active @ work, outdoors.   (Last Updated: 10/2/2012 10:13:36 AM CDT by Lilly Graves LPN)          Sexual:        Sexual orientation: Heterosexual.   (Last Updated: 10/2/2012 10:13:42 AM CDT by Lilly Graves LPN)

## 2022-02-16 NOTE — NURSING NOTE
Comprehensive Intake Entered On:  11/26/2019 1:35 PM CST    Performed On:  11/26/2019 1:32 PM CST by Zoë Gracia CMA               Summary   Chief Complaint :   follow-up cough; feels like it is coming back; dry cough   Menstrual Status :   N/A   Weight Measured :   192.4 lb(Converted to: 192 lb 6 oz, 87.27 kg)    Height Measured :   66 in(Converted to: 5 ft 6 in, 167.64 cm)    Body Mass Index :   31.05 kg/m2 (HI)    Body Surface Area :   2.01 m2   Systolic Blood Pressure :   116 mmHg   Diastolic Blood Pressure :   62 mmHg   Mean Arterial Pressure :   80 mmHg   Peripheral Pulse Rate :   97 bpm   Oxygen Saturation :   94 %   Zoë Gracia CMA - 11/26/2019 1:32 PM CST   Health Status   Allergies Verified? :   Yes   Medication History Verified? :   Yes   Immunizations Current :   Other: Pt. checking work for record.   Medical History Verified? :   Yes   Pre-Visit Planning Status :   Completed   Zoë Gracia CMA - 11/26/2019 1:32 PM CST   Consents   Consent for Immunization Exchange :   Consent Granted   Consent for Immunizations to Providers :   Consent Granted   Zoë Gracia CMA - 11/26/2019 1:32 PM CST   Meds / Allergies   (As Of: 11/26/2019 1:35:46 PM CST)   Allergies (Active)   codeine  Estimated Onset Date:   Unspecified ; Reactions:   nausea ; Created By:   Luisa Ramirez MD; Reaction Status:   Active ; Category:   Drug ; Substance:   codeine ; Type:   Allergy ; Updated By:   Luisa Ramirez MD; Reviewed Date:   11/26/2019 1:34 PM CST        Medication List   (As Of: 11/26/2019 1:35:46 PM CST)   Prescription/Discharge Order    albuterol  :   albuterol ; Status:   Prescribed ; Ordered As Mnemonic:   albuterol 90 mcg/inh inhalation aerosol ; Simple Display Line:   2 puff(s), Inhale, q4 hrs, 17 gm, 5 Refill(s) ; Ordering Provider:   Aydin Yu MD; Catalog Code:   albuterol ; Order Dt/Tm:   10/15/2019 3:52:07 PM CDT          lisinopril  :   lisinopril ; Status:   Prescribed ; Ordered As Mnemonic:    lisinopril 40 mg oral tablet ; Simple Display Line:   40 mg, 1 tab(s), po, daily, 90 tab(s), 3 Refill(s) ; Ordering Provider:   Luisa Ramirez MD; Catalog Code:   lisinopril ; Order Dt/Tm:   1/14/2019 8:51:00 AM CST          Miscellaneous Rx Supply  :   Miscellaneous Rx Supply ; Status:   Prescribed ; Ordered As Mnemonic:   CPAP 7 cm water pressure ; Simple Display Line:   See Instructions, Heated humidifier, heated tubing, filters, nasal or full face mask of choice with headgear.  Replacement cushions and supplies as needed., 1 EA, 11 Refill(s) ; Ordering Provider:   Luisa Ramirez MD; Catalog Code:   Miscellaneous Rx Supply ; Order Dt/Tm:   1/14/2019 8:55:38 AM CST          omega-3 polyunsaturated fatty acids  :   omega-3 polyunsaturated fatty acids ; Status:   Prescribed ; Ordered As Mnemonic:   omega-3 polyunsaturated fatty acids 1000 mg oral capsule ; Simple Display Line:   2,000 mg, 2 cap(s), Oral, bid, 360 cap(s), 1 Refill(s) ; Ordering Provider:   Aydin Yu MD; Catalog Code:   omega-3 polyunsaturated fatty acids ; Order Dt/Tm:   8/9/2019 1:02:19 PM CDT          omeprazole  :   omeprazole ; Status:   Prescribed ; Ordered As Mnemonic:   omeprazole 40 mg oral delayed release capsule ; Simple Display Line:   1 cap(s), Oral, daily, 90 cap(s), 1 Refill(s) ; Ordering Provider:   Aydin Yu MD; Catalog Code:   omeprazole ; Order Dt/Tm:   8/8/2019 12:02:32 PM CDT          predniSONE  :   predniSONE ; Status:   Processing ; Ordered As Mnemonic:   predniSONE 20 mg oral tablet ; Ordering Provider:   Aydin Yu MD; Action Display:   Complete ; Catalog Code:   predniSONE ; Order Dt/Tm:   11/26/2019 1:34:15 PM CST            Home Meds    aspirin  :   aspirin ; Status:   Documented ; Ordered As Mnemonic:   aspirin 81 mg oral tablet ; Simple Display Line:   81 mg, 1 tab(s), PO, Daily ; Catalog Code:   aspirin ; Order Dt/Tm:   1/7/2010 1:05:19 PM CST

## 2022-02-16 NOTE — NURSING NOTE
Influenza A/B POC Entered On:  9/25/2019 4:37 PM CDT    Performed On:  9/25/2019 4:37 PM CDT by Schoenike , Andrea               Influenza A/B POC   Influenza A POC Result :   Negative   Influenza B POC Result :   Negative   Schoenike , Andrea - 9/25/2019 4:37 PM CDT   Details   Collection Date :   9/25/2019 4:20 PM CDT   Handling Specimen POC :   Nasal   POC Test Comments :   Lab Test Preformed by:   [facility] Office  [address] [place] [address]  Phone: [phone]  Fax: [phone]     Schoenike , Andrea - 9/25/2019 4:37 PM CDT

## 2022-02-16 NOTE — PROGRESS NOTES
Patient:   CANDICE FREY JR            MRN: [id number]            FIN: [id number]               Age:   60 years     Sex:  Male     :  1960   Associated Diagnoses:   Allergic reaction; Allergy to lisinopril   Author:   Aydin Yu MD      Visit Information      Date of Service: 2021 11:13 am  Performing Location: [facility]  Encounter#: [id number]      Primary Care Provider (PCP):  Aydin Yu MD    NPI# [id number]      Referring Provider:  Aydin Yu MD    NPI# [id number]      Chief Complaint   3/30/2021 1:20 PM CDT    ED follow up. Improvement noted     Chief complaint and symptoms noted above confirmed with patient.      History of Present Illness             The patient presents with rash.  The location of the rash is bilaterally on the, localized, face.  The rash is described as red and itching.  The severity of the symptom(s) associated to the rash is severe.  The timing/ course of symptom(s) related to the rash is intermittent.  The rash has lasted for 1 week(s).  Exacerbating factors consist of allergen exposure.  Relieving factors consist of antihistamine, cortisone cream and epi.        Review of Systems   Constitutional:  Negative.    Respiratory:  Negative.    Cardiovascular:  Negative.       Health Status   Allergies:    Allergic Reactions (Selected)  Severity Not Documented  Codeine (Nausea)   Medications:  (Selected)   Prescriptions  Prescribed  AirDuo RespiClick 113 mcg-14 mcg/inh inhalation powder: See Instructions, Instructions: Inhale 1 puff by mouth bid;   rinse mouth and throat after use, # 3 EA, 1 Refill(s), Type: Maintenance, Pharmacy: [id number], sending 3 month supply to see if lower cost, Inhale 1 puff by mouth bid; ; rinse m...  CPAP 7 cm water pressure: CPAP 7 cm water pressure, See Instructions, Instructions: Heated humidifier, heated tubing, filters, nasal or full face mask of choice with headgear.  Replacement  cushions and supplies as needed., Supply, # 1 EA, 11 Refill(s), Type: Maintenance  albuterol 90 mcg/inh inhalation aerosol: 2 puff(s), Inhale, q4 hrs, # 3 EA, 3 Refill(s), Type: Maintenance, Pharmacy: [facility], 2 puff(s) Inhale q4 hrs, 66, in, 06/09/20 8:55:00 CDT, Height Measured, 191.6, lb, 06/02/20 10:48:00 CDT, Weight Measured  indomethacin 50 mg oral capsule: = 1 cap(s) ( 50 mg ), Oral, tid, PRN: for arthritis, # 30 cap(s), 1 Refill(s), Type: Maintenance, Pharmacy: [facility], 1 cap(s) Oral tid,x7 day(s),PRN:for arthritis  lisinopril 40 mg oral tablet: = 1 tab(s), Oral, daily, # 90 tab(s), 3 Refill(s), Type: Maintenance, Pharmacy: [id number], 1 tab(s) Oral daily, 66, in, 06/09/20 8:55:00 CDT, Height Measured, 191.6, lb, 06/02/20 10:48:00 CDT, Weight Measured  omeprazole 40 mg oral delayed release capsule: = 1 cap(s), Oral, daily, # 90 cap(s), 3 Refill(s), Type: Maintenance, Pharmacy: [facility][id number], 1 cap(s) Oral daily, 66, in, 06/09/20 8:55:00 CDT, Height Measured, 191.6, lb, 06/02/20 10:48:00 CDT, Weight Measured  predniSONE 10 mg oral tablet: See Instructions, Instructions: 4 tabs daily for 4 days then 3 tabs daily for 4 days then 2 tabs daily for 4 days then 1 tab daily for 4 days then 1/2 tab daily for 4 days, # 42 tab(s), 0 Refill(s), Type: Maintenance, Pharmacy: [facility],...  rosuvastatin 20 mg oral tablet: = 1 tab(s) ( 20 mg ), Oral, daily, # 90 tab(s), 3 Refill(s), Type: Maintenance, Pharmacy: [id number], 1 tab(s) Oral daily, 66, in, 06/09/20 8:55:00 CDT, Height Measured, 191.6, lb, 06/02/20 10:48:00 CDT, Weight Measured  Documented Medications  Documented  EPINEPHrine 0.3 mg injectable kit: PRN: for anaphylaxis, 0 Refill(s), Type: Maintenance  aspirin 81 mg oral tablet: 1 tab(s) ( 81 mg ), PO, Daily, 0  triamcinolone 0.1% topical cream: PRN: for itching, 0 Refill(s), Type: Maintenance   Problem list:    All Problems  (Selected)  Severe obstructive sleep apnea / SNOMED CT 667413996 / Confirmed  Obesity / SNOMED CT 5711173319 / Probable  Essential familial hyperlipidemia / ICD-9-.2 / Confirmed  Mixed hyperlipidemia / SNOMED CT 928039673 / Confirmed  HTN (Hypertension) / ICD-9-.9 / Confirmed  Shift work sleep disorder / SNOMED CT 298033785 / Confirmed  Allergic Rhinitis / ICD-9-.9 / Confirmed  GERD (Gastroesophageal Reflux Disease) / ICD-9-.81 / Confirmed      Histories   Past Medical History:    Resolved  Bowel obstruction (SNOMED CT 962953902): Onset in 1989 at 28 years.  Resolved.  Appendectomy (SNOMED CT 336626554): Onset in 1986 at 25 years.  Resolved.   Family History:    Diabetes mellitus  Father  Hypercholesterolemia  Father     Procedure history:    Polysomnography (SNOMED CT 620395595) on 3/7/2016 at 55 Years.  Comments:  3/23/2016 2:33 PM CDT - Serena Tristan CMA  AHI: 36.8  Colonoscopy (SNOMED CT 655130902) performed by Stef Herring MD on 10/30/2015 at 55 Years.  Comments:  11/17/2015 4:44 PM CST - Debra Liang RN  Sedation: Fentanyl and Midazolam  Indication: Personal history of colon polyps  Findings: Single tubular adenoma no high grade dysplasia. Diverticula in the sigmoid colon.  F/U: Repeat colonoscopy in 5 years  Colonoscopy (SNOMED CT 908774223) on 4/16/2010 at 49 Years.  Comments:  4/30/2012 9:39 AM CDT - Slime Hummel MA  3 polyps removed, 2 were adenoma's  repeat in 5 years  Exploration of abdomen (SNOMED CT 109665797) performed by Ricky Figueroa DO on 7/15/1993 at 32 Years.  Inguinal herniorrhaphy (SNOMED CT 00721444) performed by Be Galvez MD on 1/24/1992 at 31 Years.  Comments:  4/19/2016 7:02 AM CDT - Blanca Reeves  Laparoscopic left with mesh.  Appendectomy (SNOMED CT 986975443) performed by Ramos Wilson MD on 1/21/1986 at 25 Years.  Exploration of abdomen (SNOMED CT 308001528) in the month of 2/1960 at 5 Months.  Comments:  4/19/2016 6:56 AM CDT -  Blanca Reeves  Diverticulosis vs intussusception   Social History:        Electronic Cigarette/Vaping Assessment            Electronic Cigarette Use: Never.      Alcohol Assessment            Current                     Comments:                      10/02/2012 - Lilly Graves LPN                     Rare      Tobacco Assessment            Never (less than 100 in lifetime)      Substance Abuse Assessment            Never      Employment and Education Assessment            Employed, Work/School description: [employer].      Home and Environment Assessment            Marital status:  (Living together).  Spouse/Partner name: Liana.  Lives with Children, Spouse.  4               children.  Living situation: Home/Independent.      Nutrition and Health Assessment            Type of diet: Regular.      Exercise and Physical Activity Assessment                     Comments:                      10/02/2012 - Lilly Graves LPN                     Active @ work, outdoors.      Sexual Assessment            Sexual orientation: Heterosexual.        Physical Examination   Vital Signs   3/30/2021 1:20 PM CDT Temperature Tympanic 97.7 DegF  LOW    Peripheral Pulse Rate 71 bpm    Systolic Blood Pressure 136 mmHg  HI    Diastolic Blood Pressure 82 mmHg  HI    Mean Arterial Pressure 100 mmHg    BP Site Right arm    BP Method Manual    Oxygen Saturation 96 %      Measurements from flowsheet : Measurements   3/30/2021 1:20 PM CDT Height/Length Estimated 66 in    Weight Measured - Standard 202 lb      General:  Alert and oriented, No acute distress.    Neck:  Supple.    Respiratory:  Lungs are clear to auscultation, Respirations are non-labored.    Cardiovascular:  Normal rate, Regular rhythm, No murmur.    Integumentary:  Warm, Dry, Pink, rash resolved.       Impression and Plan   Diagnosis     Allergic reaction (EGA59-FY T78.40XA).     Course:  Improving.    Orders     Orders (Selected)    Prescriptions  Prescribed  predniSONE 10 mg oral tablet: See Instructions, Instructions: 4 tabs daily for 4 days then 3 tabs daily for 4 days then 2 tabs daily for 4 days then 1 tab daily for 4 days then 1/2 tab daily for 4 days, # 42 tab(s), 0 Refill(s), Type: Maintenance, Pharmacy: [id number],...  Documented Medications  Documented  EPINEPHrine 0.3 mg injectable kit: PRN: for anaphylaxis, 0 Refill(s), Type: Maintenance.     Diagnosis     Allergy to lisinopril (GHK34-AG Z88.8).     Course:  Progressing as expected.    Orders     Orders   Pharmacy:  losartan 100 mg oral tablet (Prescribe): = 1 tab(s) ( 100 mg ), Oral, daily, # 90 tab(s), 3 Refill(s), Type: Maintenance, Pharmacy: [id number], 1 tab(s) Oral daily, 66, in, 6/9/2020 8:55 AM CDT, Height Measured, 202, lb, 3/30/2021 1:20 PM CDT, Weight Measured  lisinopril 40 mg oral tablet (Discontinue).     Chart updated..

## 2022-02-16 NOTE — NURSING NOTE
Comprehensive Intake Entered On:  10/5/2021 2:07 PM CDT    Performed On:  10/5/2021 2:00 PM CDT by Litzy Vieira               Summary   Chief Complaint :   c/o hives on neck, back, and scalp since yesterday, states he has had this in the past and was treated in the past with antibiotics and vit D3.    Menstrual Status :   N/A   Weight Measured :   199.8 lb(Converted to: 199 lb 13 oz, 90.628 kg)    Height Measured :   66 in(Converted to: 5 ft 6 in, 167.64 cm)    Body Mass Index :   32.25 kg/m2 (HI)    Body Surface Area :   2.05 m2   Height/Length Estimated :   66 in(Converted to: 5 ft 6 in, 167.64 cm)    Systolic Blood Pressure :   118 mmHg   Diastolic Blood Pressure :   78 mmHg   Mean Arterial Pressure :   91 mmHg   Peripheral Pulse Rate :   92 bpm   BP Site :   Right arm   Pulse Site :   Radial artery   BP Method :   Manual   HR Method :   Manual   Temperature Tympanic :   98.4 DegF(Converted to: 36.9 DegC)    Litzy Vieira - 10/5/2021 2:00 PM CDT   Health Status   Allergies Verified? :   Yes   Medication History Verified? :   Yes   Immunizations Current :   Other: Pt. checking work for record.   Medical History Verified? :   Yes   Pre-Visit Planning Status :   Completed   Tobacco Use? :   Never smoker   Litzy Vieira - 10/5/2021 2:00 PM CDT   Consents   Consent for Immunization Exchange :   Consent Granted   Consent for Immunizations to Providers :   Consent Granted   Litzy Vieira - 10/5/2021 2:00 PM CDT   Meds / Allergies   (As Of: 10/5/2021 2:07:24 PM CDT)   Allergies (Active)   codeine  Estimated Onset Date:   Unspecified ; Reactions:   nausea ; Created By:   Luisa Ramirez MD; Reaction Status:   Active ; Category:   Drug ; Substance:   codeine ; Type:   Allergy ; Updated By:   Luisa Ramirez MD; Reviewed Date:   10/5/2021 2:05 PM CDT      lisinopril  Estimated Onset Date:   Unspecified ; Reactions:   Hives ; Created By:   Aydin Yu MD; Reaction Status:   Active ; Category:    Drug ; Substance:   lisinopril ; Type:   Allergy ; Severity:   Severe ; Updated By:   Aydin Yu MD; Reviewed Date:   10/5/2021 2:05 PM CDT        Medication List   (As Of: 10/5/2021 2:07:24 PM CDT)   Prescription/Discharge Order    Miscellaneous Rx Supply  :   Miscellaneous Rx Supply ; Status:   Prescribed ; Ordered As Mnemonic:   Replacement Auto Titrating CPAP 6-16 for daily use ; Simple Display Line:   See Instructions, Heated humidifier x1; Humidifier chamber x1;  Heated tubing x1; Full face mask of choice with headgear  x1; Cushion x 1;  Filters: Disposable x1pk & Reusable x1pk.  Length of Need = 99 Months, 1 EA, 11 Refill(s) ; Ordering Provider:   Rafael Arechiga MD; Catalog Code:   Miscellaneous Rx Supply ; Order Dt/Tm:   9/9/2021 2:38:29 PM CDT          omeprazole  :   omeprazole ; Status:   Prescribed ; Ordered As Mnemonic:   omeprazole 40 mg oral delayed release capsule ; Simple Display Line:   1 cap(s), Oral, daily, 90 cap(s), 0 Refill(s) ; Ordering Provider:   Aydin Yu MD; Catalog Code:   omeprazole ; Order Dt/Tm:   7/31/2021 9:07:59 AM CDT          rosuvastatin  :   rosuvastatin ; Status:   Prescribed ; Ordered As Mnemonic:   rosuvastatin 20 mg oral tablet ; Simple Display Line:   1 tab(s), Oral, daily, 90 tab(s), 0 Refill(s) ; Ordering Provider:   Aydin Yu MD; Catalog Code:   rosuvastatin ; Order Dt/Tm:   7/19/2021 7:48:21 PM CDT          losartan  :   losartan ; Status:   Prescribed ; Ordered As Mnemonic:   losartan 100 mg oral tablet ; Simple Display Line:   100 mg, 1 tab(s), Oral, daily, 90 tab(s), 3 Refill(s) ; Ordering Provider:   Aydin Yu MD; Catalog Code:   losartan ; Order Dt/Tm:   3/30/2021 1:40:05 PM CDT          fluticasone-salmeterol  :   fluticasone-salmeterol ; Status:   Prescribed ; Ordered As Mnemonic:   AirDuo RespiClick 113 mcg-14 mcg/inh inhalation powder ; Simple Display Line:   See Instructions, Inhale 1 puff by mouth bid;    rinse mouth and throat after use, 3 EA, 1 Refill(s) ; Ordering Provider:   Aydin Yu MD; Catalog Code:   fluticasone-salmeterol ; Order Dt/Tm:   2/9/2021 2:08:22 PM CST          albuterol  :   albuterol ; Status:   Prescribed ; Ordered As Mnemonic:   albuterol 90 mcg/inh inhalation aerosol ; Simple Display Line:   2 puff(s), Inhale, q4 hrs, 3 EA, 3 Refill(s) ; Ordering Provider:   Aydin Yu MD; Catalog Code:   albuterol ; Order Dt/Tm:   6/9/2020 9:25:48 AM CDT          indomethacin  :   indomethacin ; Status:   Prescribed ; Ordered As Mnemonic:   indomethacin 50 mg oral capsule ; Simple Display Line:   50 mg, 1 cap(s), Oral, tid, for 7 day(s), PRN: for arthritis, 30 cap(s), 1 Refill(s) ; Ordering Provider:   Janice Guillory PA-C; Catalog Code:   indomethacin ; Order Dt/Tm:   1/5/2020 8:55:37 AM CST          Miscellaneous Rx Supply  :   Miscellaneous Rx Supply ; Status:   Prescribed ; Ordered As Mnemonic:   CPAP 7 cm water pressure ; Simple Display Line:   See Instructions, Heated humidifier, heated tubing, filters, nasal or full face mask of choice with headgear.  Replacement cushions and supplies as needed., 1 EA, 11 Refill(s) ; Ordering Provider:   Luisa Rmairez MD; Catalog Code:   Miscellaneous Rx Supply ; Order Dt/Tm:   1/14/2019 8:55:38 AM CST            Home Meds    cholecalciferol  :   cholecalciferol ; Status:   Documented ; Ordered As Mnemonic:   Vitamin D3 ; Simple Display Line:   daily, 0 Refill(s) ; Catalog Code:   cholecalciferol ; Order Dt/Tm:   10/5/2021 2:06:35 PM CDT          EPINEPHrine  :   EPINEPHrine ; Status:   Documented ; Ordered As Mnemonic:   EPINEPHrine 0.3 mg injectable kit ; Simple Display Line:   PRN: for anaphylaxis, 0 Refill(s) ; Catalog Code:   EPINEPHrine ; Order Dt/Tm:   3/30/2021 1:26:24 PM CDT          triamcinolone topical  :   triamcinolone topical ; Status:   Documented ; Ordered As Mnemonic:   triamcinolone 0.1% topical cream ; Simple Display  Line:   PRN: for itching, 0 Refill(s) ; Catalog Code:   triamcinolone topical ; Order Dt/Tm:   3/30/2021 1:26:24 PM CDT          aspirin  :   aspirin ; Status:   Documented ; Ordered As Mnemonic:   aspirin 81 mg oral tablet ; Simple Display Line:   81 mg, 1 tab(s), PO, Daily ; Catalog Code:   aspirin ; Order Dt/Tm:   1/7/2010 1:05:19 PM CST

## 2022-02-16 NOTE — LETTER
(Inserted Image. Unable to display)   [address] [place] [id number] [phone] (phone)  [phone] (fax)  CANDICE FREY     [address] [address]        Dear CANDICE,    Thank you for selecting our practice as your care provider. Below you will find the results and recent diagnostic testings outcomes we have reviewed.        Please make an appointment to discuss these results.      Result Name Current Result Previous Result Reference Range   Sodium Level (mmol/L)  140 7/30/2021  137 8/18/2020   139 6/2/2020   139 1/5/2020 135 - 146   Potassium Level (mmol/L)  4.3 7/30/2021  4.7 8/18/2020   4.6 6/2/2020   4.5 1/5/2020 3.5 - 5.3   Chloride Level (mmol/L)  105 7/30/2021  102 8/18/2020   104 6/2/2020   105 1/5/2020 98 - 110   CO2 Level (mmol/L)  28 7/30/2021  28 8/18/2020   23 6/2/2020   24 1/5/2020 20 - 32   Glucose Level (mg/dL) ((H)) 101 7/30/2021  95 8/18/2020  ((H)) 104 6/2/2020   99 1/5/2020 65 - 99   BUN (mg/dL)  17 7/30/2021  19 8/18/2020   22 6/2/2020   15 1/5/2020 7 - 25   Creatinine Level (mg/dL)  0.96 7/30/2021  1.10 8/18/2020   1.16 6/2/2020   1.13 1/5/2020 0.70 - 1.25   eGFR (mL/min/1.73m2)  85 7/30/2021  72 8/18/2020   68 6/2/2020 > OR = 60 -    Calcium Level (mg/dL)  10.1 7/30/2021  10.0 8/18/2020   10.3 6/2/2020   9.9 1/5/2020 8.6 - 10.3   Bilirubin Total (mg/dL)  0.4 7/30/2021  0.7 8/18/2020 0.2 - 1.2   Alkaline Phosphatase (unit/L)  80 7/30/2021  67 8/18/2020 35 - 144   AST/SGOT (unit/L)  26 7/30/2021  28 8/18/2020 10 - 35   ALT/SGPT (unit/L)  41 7/30/2021  40 8/18/2020 9 - 46   Protein Total (gm/dL)  6.9 7/30/2021  7.0 8/18/2020 6.1 - 8.1   Albumin Level (gm/dL)  4.7 7/30/2021  4.8 8/18/2020 3.6 - 5.1   Globulin  2.2 7/30/2021  2.2 8/18/2020 1.9 - 3.7   A/G Ratio  2.1 7/30/2021  2.2 8/18/2020 1.0 - 2.5   Cholesterol (mg/dL)  179 7/30/2021 ((H)) 220 8/18/2020  ((H)) 315 6/2/2020  - <200   Non-HDL Cholesterol ((H)) 145 7/30/2021 ((H)) 180 8/18/2020  ((H)) 276 6/2/2020  -  <130   HDL (mg/dL) ((L)) 34 7/30/2021  40 8/18/2020  ((L)) 39 6/2/2020 > OR = 40 -    Cholesterol/HDL Ratio ((H)) 5.3 7/30/2021 ((H)) 5.5 8/18/2020  ((H)) 8.1 6/2/2020  - <5.0   LDL  See comment 7/30/2021  See comment 8/18/2020   See comment 6/2/2020    LDL Direct (mg/dL)  79 7/30/2021 ((H)) 108 8/18/2020  ((H)) 172 6/2/2020  - <100   Triglyceride (mg/dL) ((H)) 555 7/30/2021 ((H)) 433 8/18/2020  ((H)) 626 6/2/2020  - <150       Please contact my practice at the number listed above if you have any questions or concerns.     Sincerely,        Aydin Yu MD, FAAFP      What do your labs mean? Below is a glossary of commonly ordered labs:  LDL - Bad Cholesterol HDL - Good Cholesterol AST/ALT - Liver Function  PSA -  Prostate  TSH - Thyroid  Hgb (Hemoglobin) - Red Blood Cells  Cr/Creatinine/Microalbumin/ BUN/eGFR - Kidney Function HgbA1c - Diabetes Test

## 2022-02-16 NOTE — NURSING NOTE
Comprehensive Intake Entered On:  9/25/2019 4:09 PM CDT    Performed On:  9/25/2019 4:06 PM CDT by Netta Srivastava CMA               Summary   Chief Complaint :   c/o cough, hurting ribs, HA, chills  x 3 days; using thera-flu and advil   Menstrual Status :   N/A   Weight Measured :   190.4 lb(Converted to: 190 lb 6 oz, 86.36 kg)    Height Measured :   66 in(Converted to: 5 ft 6 in, 167.64 cm)    Body Mass Index :   30.73 kg/m2 (HI)    Body Surface Area :   2 m2   Systolic Blood Pressure :   148 mmHg (HI)    Diastolic Blood Pressure :   80 mmHg   Mean Arterial Pressure :   103 mmHg   Peripheral Pulse Rate :   101 bpm (HI)    BP Site :   Left arm   BP Method :   Manual   HR Method :   Electronic   Temperature Tympanic :   102.6 DegF(Converted to: 39.2 DegC)  (HI)    Oxygen Saturation :   94 %   Netta Srivastava CMA - 9/25/2019 4:06 PM CDT   Health Status   Allergies Verified? :   Yes   Immunizations Current :   Other: Pt. checking work for record.   Medical History Verified? :   Yes   Tobacco Use? :   Never smoker   Netta Srivastava CMA - 9/25/2019 4:06 PM CDT   Consents   Consent for Immunization Exchange :   Consent Granted   Consent for Immunizations to Providers :   Consent Granted   Netta Srivastava CMA - 9/25/2019 4:06 PM CDT   Meds / Allergies   (As Of: 9/25/2019 4:09:45 PM CDT)   Allergies (Active)   codeine  Estimated Onset Date:   Unspecified ; Reactions:   nausea ; Created By:   Luisa Ramirez MD; Reaction Status:   Active ; Category:   Drug ; Substance:   codeine ; Type:   Allergy ; Updated By:   Luisa Ramirez MD; Reviewed Date:   9/25/2019 4:09 PM CDT        Medication List   (As Of: 9/25/2019 4:09:45 PM CDT)   Prescription/Discharge Order    lisinopril  :   lisinopril ; Status:   Prescribed ; Ordered As Mnemonic:   lisinopril 40 mg oral tablet ; Simple Display Line:   40 mg, 1 tab(s), po, daily, 90 tab(s), 3 Refill(s) ; Ordering Provider:   Luisa Ramirez MD; Catalog Code:   lisinopril ; Order Dt/Tm:    1/14/2019 8:51:00 AM          Miscellaneous Rx Supply  :   Miscellaneous Rx Supply ; Status:   Prescribed ; Ordered As Mnemonic:   CPAP 7 cm water pressure ; Simple Display Line:   See Instructions, Heated humidifier, heated tubing, filters, nasal or full face mask of choice with headgear.  Replacement cushions and supplies as needed., 1 EA, 11 Refill(s) ; Ordering Provider:   Luisa Ramirez MD; Catalog Code:   Miscellaneous Rx Supply ; Order Dt/Tm:   1/14/2019 8:55:38 AM          omega-3 polyunsaturated fatty acids  :   omega-3 polyunsaturated fatty acids ; Status:   Prescribed ; Ordered As Mnemonic:   omega-3 polyunsaturated fatty acids 1000 mg oral capsule ; Simple Display Line:   2,000 mg, 2 cap(s), Oral, bid, 360 cap(s), 1 Refill(s) ; Ordering Provider:   Aydin Yu MD; Catalog Code:   omega-3 polyunsaturated fatty acids ; Order Dt/Tm:   8/9/2019 1:02:19 PM          omeprazole  :   omeprazole ; Status:   Prescribed ; Ordered As Mnemonic:   omeprazole 40 mg oral delayed release capsule ; Simple Display Line:   1 cap(s), Oral, daily, 90 cap(s), 1 Refill(s) ; Ordering Provider:   Aydin Yu MD; Catalog Code:   omeprazole ; Order Dt/Tm:   8/8/2019 12:02:32 PM            Home Meds    aspirin  :   aspirin ; Status:   Documented ; Ordered As Mnemonic:   aspirin 81 mg oral tablet ; Simple Display Line:   81 mg, 1 tab(s), PO, Daily ; Catalog Code:   aspirin ; Order Dt/Tm:   1/7/2010 1:05:19 PM

## 2022-02-16 NOTE — NURSING NOTE
Comprehensive Intake Entered On:  10/29/2019 3:30 PM CDT    Performed On:  10/29/2019 3:27 PM CDT by Trina Patino MA               Summary   Chief Complaint :   Follow up cough   Menstrual Status :   N/A   Weight Measured :   188 lb(Converted to: 188 lb 0 oz, 85.28 kg)    Height Measured :   66 in(Converted to: 5 ft 6 in, 167.64 cm)    Body Mass Index :   30.34 kg/m2 (HI)    Body Surface Area :   1.99 m2   Systolic Blood Pressure :   126 mmHg   Diastolic Blood Pressure :   78 mmHg   Mean Arterial Pressure :   94 mmHg   Peripheral Pulse Rate :   64 bpm   BP Site :   Left arm   Pulse Site :   Radial artery   BP Method :   Manual   HR Method :   Manual   Temperature Tympanic :   98.5 DegF(Converted to: 36.9 DegC)    Trina Patino MA - 10/29/2019 3:27 PM CDT   Health Status   Allergies Verified? :   Yes   Medication History Verified? :   Yes   Immunizations Current :   Other: Pt. checking work for record.   Medical History Verified? :   Yes   Pre-Visit Planning Status :   Completed   Tobacco Use? :   Never smoker   Trina Patino MA - 10/29/2019 3:27 PM CDT   Consents   Consent for Immunization Exchange :   Consent Granted   Consent for Immunizations to Providers :   Consent Granted   Trina Patino MA - 10/29/2019 3:27 PM CDT   Meds / Allergies   (As Of: 10/29/2019 3:30:37 PM CDT)   Allergies (Active)   codeine  Estimated Onset Date:   Unspecified ; Reactions:   nausea ; Created By:   Luisa Ramirez MD; Reaction Status:   Active ; Category:   Drug ; Substance:   codeine ; Type:   Allergy ; Updated By:   Luisa Ramirez MD; Reviewed Date:   10/29/2019 3:28 PM CDT        Medication List   (As Of: 10/29/2019 3:30:37 PM CDT)   Prescription/Discharge Order    albuterol  :   albuterol ; Status:   Prescribed ; Ordered As Mnemonic:   albuterol 90 mcg/inh inhalation aerosol ; Simple Display Line:   2 puff(s), Inhale, q4 hrs, 17 gm, 5 Refill(s) ; Ordering Provider:   Ayidn Yu MD; Catalog Code:   albuterol  ; Order Dt/Tm:   10/15/2019 3:52:07 PM CDT          omega-3 polyunsaturated fatty acids  :   omega-3 polyunsaturated fatty acids ; Status:   Prescribed ; Ordered As Mnemonic:   omega-3 polyunsaturated fatty acids 1000 mg oral capsule ; Simple Display Line:   2,000 mg, 2 cap(s), Oral, bid, 360 cap(s), 1 Refill(s) ; Ordering Provider:   Aydin Yu MD; Catalog Code:   omega-3 polyunsaturated fatty acids ; Order Dt/Tm:   8/9/2019 1:02:19 PM CDT          omeprazole  :   omeprazole ; Status:   Prescribed ; Ordered As Mnemonic:   omeprazole 40 mg oral delayed release capsule ; Simple Display Line:   1 cap(s), Oral, daily, 90 cap(s), 1 Refill(s) ; Ordering Provider:   Aydin Yu MD; Catalog Code:   omeprazole ; Order Dt/Tm:   8/8/2019 12:02:32 PM CDT          Miscellaneous Rx Supply  :   Miscellaneous Rx Supply ; Status:   Prescribed ; Ordered As Mnemonic:   CPAP 7 cm water pressure ; Simple Display Line:   See Instructions, Heated humidifier, heated tubing, filters, nasal or full face mask of choice with headgear.  Replacement cushions and supplies as needed., 1 EA, 11 Refill(s) ; Ordering Provider:   Luisa Ramirez MD; Catalog Code:   Miscellaneous Rx Supply ; Order Dt/Tm:   1/14/2019 8:55:38 AM CST          lisinopril  :   lisinopril ; Status:   Prescribed ; Ordered As Mnemonic:   lisinopril 40 mg oral tablet ; Simple Display Line:   40 mg, 1 tab(s), po, daily, 90 tab(s), 3 Refill(s) ; Ordering Provider:   Luisa Ramirez MD; Catalog Code:   lisinopril ; Order Dt/Tm:   1/14/2019 8:51:00 AM CST            Home Meds    aspirin  :   aspirin ; Status:   Documented ; Ordered As Mnemonic:   aspirin 81 mg oral tablet ; Simple Display Line:   81 mg, 1 tab(s), PO, Daily ; Catalog Code:   aspirin ; Order Dt/Tm:   1/7/2010 1:05:19 PM CST

## 2022-02-16 NOTE — NURSING NOTE
CAGE Assessment Entered On:  10/15/2019 4:52 PM CDT    Performed On:  10/15/2019 4:52 PM CDT by Trina Patino MA               Assessment   Have you ever felt you should cut down on your drinking :   No   Have people annoyed you by criticizing your drinking :   No   Have you ever felt bad or guilty about your drinking :   No   Have you ever taken a drink first thing in the morning to steady your nerves or get rid of a hangover (Eye-opener) :   No   CAGE Score :   0    Trina Patino MA - 10/15/2019 4:52 PM CDT

## 2022-02-16 NOTE — TELEPHONE ENCOUNTER
---------------------  From: Amanda Rahman RN (Phone Messages Pool ([id number]_[facility]))   Sent: 12/21/2021 4:30:07 PM CST  Subject: refills       PCP:  KIERAN      Time of Call:  4:09pm       Person Calling:  pt  Phone number:  [phone]    Returned call at: 4:30pm    Note:   VM received from pt, requesting refills of Rosuvastatin and Omeprazole.   Rosuvastatin 20mg 1 tab QHS #90 last rx 7/19/21  Omeprazole 40mg 1 cap QD #90 last rx 7/31/21  Filled per protocol.    Last office visit and reason:  11/4/21 tennis elbow BAMPt called back, stating Sherita did not receive the refills that were sent yesterday. Informed the refills were sent to [facility] in [place]. Informed [facility] is the preferred pharmacy listed for pt. Pt was asked if wants to change preferred pharmacy; was told no. He will go and ; will call back if needed.

## 2022-02-16 NOTE — PROGRESS NOTES
Patient:   CANDICE FREY JR            MRN: [id number]            FIN: [id number]               Age:   56 years     Sex:  Male     :  1960   Associated Diagnoses:   Cold virus   Author:   Aydin Yu MD      Chief Complaint   2017 9:44 AM CST    c/o congestion, body aches, fever x Tuesday   Chief complaint and symptoms noted above confirmed with patient.      History of Present Illness             The patient presents with symptoms of an upper respiratory infection.  The symptoms of the upper respiratory infection are described as rhinorrhea, cough and Body aches.  The severity of the symptoms associated to the upper respiratory infection is moderate.  The timing/course of upper respiratory infection symptoms is constant.               The patient presents with nasal congestion.  The location is both sides.  There were exacerbating factors.  It is described as a sensation of pressure.  The symptom occurs intermittently.  The course is worsening.        Review of Systems   Constitutional:  Fever.    Ear/Nose/Mouth/Throat:  Nasal congestion.    Respiratory:  Cough, No wheezing.       Health Status   Allergies:    Allergic Reactions (Selected)  Severity Not Documented  Codeine (Nausea)   Medications:  (Selected)   Prescriptions  Prescribed  CPAP 10 cm water pressure: CPAP 10 cm water pressure, See Instructions, Instructions: Heated humidifier, heated tubing, filters, nasal or full face mask of choice with headgear.  Replacement cushions and supplies as needed., Supply, # 1 EA, 11 Refill(s), Type: Maintenance  CPAP 7 cm water pressure: CPAP 7 cm water pressure, See Instructions, Instructions: Heated humidifier, heated tubing, filters, nasal or full face mask of choice with headgear.  Replacement cushions and supplies as needed., Supply, # 1 EA, 11 Refill(s), Type: Maintenance, Heate...  lisinopril 40 mg oral tablet: 1 tab(s) ( 40 mg ), po, daily, # 90 tab(s), 1 Refill(s), Type: Maintenance,  Pharmacy: [facility] [id number] [facility] [place], 1 tab(s) po daily  omeprazole 40 mg oral delayed release capsule: 1 cap(s) ( 40 mg ), po, daily, # 90 cap(s), 1 Refill(s), Type: Maintenance, Pharmacy: [facility] [id number] [facility] [place], 1 cap(s) po daily  Documented Medications  Documented  aspirin 81 mg oral tablet: 1 tab(s) ( 81 mg ), PO, Daily, 0  atorvastatin 20 mg oral tablet: 1 tab(s) ( 20 mg ), po, hs, Instructions: Trial per Dr. Root, 0 Refill(s), Type: Maintenance   Problem list:    All Problems  Allergic Rhinitis / ICD-9-.9 / Confirmed  Shift work sleep disorder / SNOMED CT 639765792 / Confirmed  Essential familial hyperlipidemia / ICD-9-.2 / Confirmed  GERD (Gastroesophageal Reflux Disease) / ICD-9-.81 / Confirmed  HTN (Hypertension) / ICD-9-.9 / Confirmed  Severe obstructive sleep apnea / SNOMED CT 342166784 / Confirmed  Resolved: Appendectomy / SNOMED CT 886431227  Resolved: Bowel obstruction / SNOMED CT 354266523      Histories   Past Medical History:    Resolved  Bowel obstruction (SNOMED CT 054058566): Onset in 1989 at 28 years.  Resolved.  Appendectomy (SNOMED CT 531580081): Onset in 1986 at 25 years.  Resolved.   Family History:    Diabetes mellitus  Father  Hypercholesterolemia  Father     Procedure history:    Polysomnography (SNOMED CT 110400523) on 3/7/2016 at 55 Years.  Comments:  3/23/2016 2:33 PM - Serena Tristan CMA  AHI: 36.8  Colonoscopy (SNOMED CT 964361419) performed by Stef Herring MD on 10/30/2015 at 55 Years.  Comments:  11/17/2015 4:44 PM - Debra Liang RN  Sedation: Fentanyl and Midazolam  Indication: Personal history of colon polyps  Findings: Single tubular adenoma no high grade dysplasia. Diverticula in the sigmoid colon.  F/U: Repeat colonoscopy in 5 years  Colonoscopy (SNOMED CT 430138538) on 4/16/2010 at 49 Years.  Comments:  4/30/2012 9:39 AM - Slime Hummel MA  3 polyps removed, 2 were adenoma's  repeat in 5 years  Exploration of abdomen (SNOMED CT 983969260)  performed by Ricky Figueroa DO on 7/15/1993 at 32 Years.  Inguinal herniorrhaphy (SNOMED CT 51612866) performed by Be Galvez MD on 1/24/1992 at 31 Years.  Comments:  4/19/2016 7:02 AM - Blanca Reeves  Laparoscopic left with mesh.  Appendectomy (SNOMED CT 261651376) performed by Ramos Wilson MD on 1/21/1986 at 25 Years.  Exploration of abdomen (SNOMED CT 961358288) in the month of 2[birth year]1960 at 4 Months.  Comments:  4/19/2016 6:56 AM - Blanca Reeves  Diverticulosis vs intussusception   Social History:        Alcohol Assessment            Current                     Comments:                      10/02/2012 - Lilly Graves LPN      Tobacco Assessment            Never      Substance Abuse Assessment            Never      Employment and Education Assessment            Employed, Work/School description: [employer].      Home and Environment Assessment            Marital status:  (Living together).  Spouse/Partner name: Liana.  Lives with Children, Spouse.  4               children.  Living situation: Home/Independent.      Nutrition and Health Assessment            Type of diet: Regular.      Exercise and Physical Activity Assessment                     Comments:                      10/02/2012 - Lilly Graves LPN                     Active @ work, outdoors.      Sexual Assessment            Sexual orientation: Heterosexual.        Physical Examination   Vital Signs   2/24/2017 9:44 AM CST Temperature Tympanic 97.9 DegF    Peripheral Pulse Rate 88 bpm    Pulse Site Radial artery    HR Method Manual    Systolic Blood Pressure 134 mmHg    Diastolic Blood Pressure 86 mmHg    Mean Arterial Pressure 102 mmHg    BP Site Right arm    BP Method Manual      Measurements from flowsheet : Measurements   2/24/2017 9:44 AM CST Height Measured - Standard 67.25 in    Weight Measured - Standard 189.2 lb    BSA 2.02 m2    Body Mass Index 29.41 kg/m2      General:  Alert and oriented.     HENT:  Normocephalic.    Neck:  Supple, Non-tender, No lymphadenopathy.    Respiratory:  Lungs are clear to auscultation.    Cardiovascular:  Normal rate, Regular rhythm.    Gastrointestinal:  Soft, Non-tender, Non-distended.    Integumentary:  Warm, Dry, Pink.    Neurologic:  Alert, Oriented.    Psychiatric:  Cooperative.       Review / Management   Results review:  Lab results   2/24/2017 10:30 AM CST Influenza A POC Negative    Influenza B POC Negative     .       Impression and Plan   Diagnosis     Cold virus ([id number]).     Course:  Progressing as expected.    Patient Instructions:       Counseled: SX RX.    Summary:  Will keep off work for a week.    Orders     Orders   Requests (Lab):  Influenza A and B Antigen (Request) (Order): Priority: Urgent, Cold virus.     Orders   Pharmacy:  Promethazine with Codeine 6.25 mg-10 mg/5 mL oral syrup (Prescribe): 5 mL, po, q4 hrs, # 120 mL, 0 Refill(s), Type: Maintenance.

## 2022-02-16 NOTE — NURSING NOTE
Comprehensive Intake Entered On:  3/15/2021 5:36 PM CDT    Performed On:  3/15/2021 5:35 PM CDT by Tess Flores CMA               Summary   Chief Complaint :   consent for video visit for rash... uses [facility] in [place]   Menstrual Status :   N/A   Tess Flores CMA - 3/15/2021 5:35 PM CDT   Health Status   Allergies Verified? :   Yes   Medication History Verified? :   Yes   Immunizations Current :   Other: Pt. checking work for record.   Medical History Verified? :   Yes   Tobacco Use? :   Never smoker   Tess Flores CMA - 3/15/2021 5:35 PM CDT   Consents   Consent for Immunization Exchange :   Consent Granted   Consent for Immunizations to Providers :   Consent Granted   Tess Flores CMA - 3/15/2021 5:35 PM CDT   Meds / Allergies   (As Of: 3/15/2021 5:36:22 PM CDT)   Allergies (Active)   codeine  Estimated Onset Date:   Unspecified ; Reactions:   nausea ; Created By:   Lusia Ramirez MD; Reaction Status:   Active ; Category:   Drug ; Substance:   codeine ; Type:   Allergy ; Updated By:   Luisa Ramirez MD; Reviewed Date:   1/31/2021 10:24 AM CST        Medication List   (As Of: 3/15/2021 5:36:22 PM CDT)   Prescription/Discharge Order    albuterol  :   albuterol ; Status:   Prescribed ; Ordered As Mnemonic:   albuterol 90 mcg/inh inhalation aerosol ; Simple Display Line:   2 puff(s), Inhale, q4 hrs, 3 EA, 3 Refill(s) ; Ordering Provider:   Aydin Yu MD; Catalog Code:   albuterol ; Order Dt/Tm:   6/9/2020 9:25:48 AM CDT          fluticasone-salmeterol  :   fluticasone-salmeterol ; Status:   Prescribed ; Ordered As Mnemonic:   AirDuo RespiClick 113 mcg-14 mcg/inh inhalation powder ; Simple Display Line:   See Instructions, Inhale 1 puff by mouth bid;   rinse mouth and throat after use, 3 EA, 1 Refill(s) ; Ordering Provider:   Aydin Yu MD; Catalog Code:   fluticasone-salmeterol ; Order Dt/Tm:   2/9/2021 2:08:22 PM CST          indomethacin  :   indomethacin ; Status:    Prescribed ; Ordered As Mnemonic:   indomethacin 50 mg oral capsule ; Simple Display Line:   50 mg, 1 cap(s), Oral, tid, for 7 day(s), PRN: for arthritis, 30 cap(s), 1 Refill(s) ; Ordering Provider:   Janice Guillory PA-C; Catalog Code:   indomethacin ; Order Dt/Tm:   1/5/2020 8:55:37 AM CST          lisinopril  :   lisinopril ; Status:   Prescribed ; Ordered As Mnemonic:   lisinopril 40 mg oral tablet ; Simple Display Line:   1 tab(s), Oral, daily, 90 tab(s), 3 Refill(s) ; Ordering Provider:   Aydin Yu MD; Catalog Code:   lisinopril ; Order Dt/Tm:   6/9/2020 9:27:03 AM CDT          Miscellaneous Rx Supply  :   Miscellaneous Rx Supply ; Status:   Prescribed ; Ordered As Mnemonic:   CPAP 7 cm water pressure ; Simple Display Line:   See Instructions, Heated humidifier, heated tubing, filters, nasal or full face mask of choice with headgear.  Replacement cushions and supplies as needed., 1 EA, 11 Refill(s) ; [facility]   [facility]; Catalog Code:   Miscellaneous Rx Supply ; Order Dt/Tm:   1/14/2019 8:55:38 AM CST          omeprazole  :   omeprazole ; Status:   Prescribed ; Ordered As Mnemonic:   omeprazole 40 mg oral delayed release capsule ; Simple Display Line:   1 cap(s), Oral, daily, 90 cap(s), 3 Refill(s) ; Ordering Provider:   Aydni Yu MD; Catalog Code:   omeprazole ; Order Dt/Tm:   6/9/2020 9:27:15 AM CDT          rosuvastatin  :   rosuvastatin ; Status:   Prescribed ; Ordered As Mnemonic:   rosuvastatin 20 mg oral tablet ; Simple Display Line:   20 mg, 1 tab(s), Oral, daily, 90 tab(s), 3 Refill(s) ; Ordering Provider:   Aydin Yu MD; Catalog Code:   rosuvastatin ; Order Dt/Tm:   6/9/2020 9:27:34 AM CDT            Home Meds    aspirin  :   aspirin ; Status:   Documented ; Ordered As Mnemonic:   aspirin 81 mg oral tablet ; Simple Display Line:   81 mg, 1 tab(s), PO, Daily ; Catalog Code:   aspirin ; Order Dt/Tm:   1/7/2010 1:05:19 PM CST            ID  Risk Screen   Recent Travel History :   No recent travel   Family Member Travel History :   No recent travel   Other Exposure to Infectious Disease :   Unknown   COVID-19 Testing Status :   No COVID-19 test performed   Tess Flores CMA - 3/15/2021 5:35 PM CDT

## 2022-02-16 NOTE — NURSING NOTE
Comprehensive Intake Entered On:  1/31/2021 10:26 AM CST    Performed On:  1/31/2021 10:19 AM CST by Carlo DIAZ Lexii               Summary   Chief Complaint :   Cough and SOB x2 weeks. No exposure to covid   Menstrual Status :   N/A   Lexii Matos CMA - 1/31/2021 10:19 AM CST   Health Status   Pre-Visit Planning Status :   N/A   Tobacco Use? :   Never smoker   Lexii Matos CMA - 1/31/2021 10:28 AM CST   Allergies Verified? :   Yes   Medication History Verified? :   Yes   Immunizations Current :   Other: Pt. checking work for record.   Medical History Verified? :   Yes   Jose Matos CMAhanie - 1/31/2021 10:19 AM CST   Consents   Consent for Immunization Exchange :   Consent Granted   Consent for Immunizations to Providers :   Consent Granted   Carlo DIAZJoseLexii - 1/31/2021 10:19 AM CST   Meds / Allergies   (As Of: 1/31/2021 10:26:34 AM CST)   Allergies (Active)   codeine  Estimated Onset Date:   Unspecified ; Reactions:   nausea ; Created By:   Luisa Ramirez MD; Reaction Status:   Active ; Category:   Drug ; Substance:   codeine ; Type:   Allergy ; Updated By:   Luisa Ramirez MD; Reviewed Date:   1/31/2021 10:24 AM CST        Medication List   (As Of: 1/31/2021 10:26:34 AM CST)   Prescription/Discharge Order    albuterol  :   albuterol ; Status:   Prescribed ; Ordered As Mnemonic:   albuterol 90 mcg/inh inhalation aerosol ; Simple Display Line:   2 puff(s), Inhale, q4 hrs, 3 EA, 3 Refill(s) ; Ordering Provider:   Aydin Yu MD; Catalog Code:   albuterol ; Order Dt/Tm:   6/9/2020 9:25:48 AM CDT          formoterol-mometasone  :   formoterol-mometasone ; Status:   Prescribed ; Ordered As Mnemonic:   Dulera 100 mcg-5 mcg/inh inhalation aerosol ; Simple Display Line:   1 puff(s), Inhale, bid, 3 EA, 3 Refill(s) ; Ordering Provider:   Aydin Yu MD; Catalog Code:   formoterol-mometasone ; Order Dt/Tm:   6/9/2020 9:25:49 AM CDT          indomethacin  :   indomethacin ; Status:   Prescribed  ; Ordered As Mnemonic:   indomethacin 50 mg oral capsule ; Simple Display Line:   50 mg, 1 cap(s), Oral, tid, for 7 day(s), PRN: for arthritis, 30 cap(s), 1 Refill(s) ; Ordering Provider:   Janice Guillory PA-C; Catalog Code:   indomethacin ; Order Dt/Tm:   1/5/2020 8:55:37 AM CST          lisinopril  :   lisinopril ; Status:   Prescribed ; Ordered As Mnemonic:   lisinopril 40 mg oral tablet ; Simple Display Line:   1 tab(s), Oral, daily, 90 tab(s), 3 Refill(s) ; Ordering Provider:   Aydin Yu MD; Catalog Code:   lisinopril ; Order Dt/Tm:   6/9/2020 9:27:03 AM CDT          Miscellaneous Rx Supply  :   Miscellaneous Rx Supply ; Status:   Prescribed ; Ordered As Mnemonic:   CPAP 7 cm water pressure ; Simple Display Line:   See Instructions, Heated humidifier, heated tubing, filters, nasal or full face mask of choice with headgear.  Replacement cushions and supplies as needed., 1 EA, 11 Refill(s) ; [facility]ing [facility]   [facility]; Catalog Code:   Miscellaneous Rx Supply ; Order Dt/Tm:   1/14/2019 8:55:38 AM CST          omeprazole  :   omeprazole ; Status:   Prescribed ; Ordered As Mnemonic:   omeprazole 40 mg oral delayed release capsule ; Simple Display Line:   1 cap(s), Oral, daily, 90 cap(s), 3 Refill(s) ; Ordering Provider:   Aydin Yu MD; Catalog Code:   omeprazole ; Order Dt/Tm:   6/9/2020 9:27:15 AM CDT          rosuvastatin  :   rosuvastatin ; Status:   Prescribed ; Ordered As Mnemonic:   rosuvastatin 20 mg oral tablet ; Simple Display Line:   20 mg, 1 tab(s), Oral, daily, 90 tab(s), 3 Refill(s) ; Ordering Provider:   Aydin Yu MD; Catalog Code:   rosuvastatin ; Order Dt/Tm:   6/9/2020 9:27:34 AM CDT            Home Meds    aspirin  :   aspirin ; Status:   Documented ; Ordered As Mnemonic:   aspirin 81 mg oral tablet ; Simple Display Line:   81 mg, 1 tab(s), PO, Daily ; Catalog Code:   aspirin ; Order Dt/Tm:   1/7/2010 1:05:19 PM CST            ID Risk Screen    Recent Travel History :   No recent travel   Family Member Travel History :   No recent travel   Other Exposure to Infectious Disease :   Unknown   COVID-19 Testing Status :   No COVID-19 test performed   Carlo JOEDilanie - 1/31/2021 10:19 AM CST   Social History   Social History   (As Of: 1/31/2021 10:28:55 AM CST)   Alcohol:        Current   Comments:  10/2/2012 10:12 AM - Lilly Graves LPN: Rare   (Last Updated: 10/2/2012 10:12:11 AM CDT by Lilly Graves LPN)          Tobacco:        Never (less than 100 in lifetime)   (Last Updated: 12/21/2020 11:06:02 AM CST by Lotus Connell CMA)          Electronic Cigarette/Vaping:        Electronic Cigarette Use: Never.   (Last Updated: 12/21/2020 11:05:58 AM CST by Lotus Connell CMA)          Substance Abuse:        Never   (Last Updated: 10/2/2012 10:12:22 AM CDT by Lilly Graves LPN)          Employment/School:        Employed, Work/School description: [employer]..   (Last Updated: 10/2/2012 10:12:43 AM CDT by Lilly Graves LPN)          Home/Environment:        Marital status:  (Living together).  Spouse/Partner name: Liana.  Lives with Children, Spouse.  4 children.  Living situation: Home/Independent.   (Last Updated: 10/2/2012 10:13:09 AM CDT by Lilly Graves LPN)          Nutrition/Health:        Type of diet: Regular.   (Last Updated: 10/2/2012 10:13:15 AM CDT by Lilly Graves LPN)          Exercise:         Comments:  10/2/2012 10:13 AM - Lilly Graves LPN: Active @ work, outdoors.   (Last Updated: 10/2/2012 10:13:36 AM CDT by Lilly Graves LPN)          Sexual:        Sexual orientation: Heterosexual.   (Last Updated: 10/2/2012 10:13:42 AM CDT by Lilly Graves LPN)

## 2022-02-16 NOTE — TELEPHONE ENCOUNTER
Entered by Netta Srivastava CMA on April 28, 2020 8:17:04 AM CDT  ---------------------  From: Netta Srivastava CMA   To: [facility] #[id number]    Sent: 4/28/2020 8:17:04 AM CDT  Subject: Medication Management     ** Submitted: **  Order:omeprazole (omeprazole 40 mg oral delayed release capsule)  1 cap(s)  Oral  daily  Qty:  30 cap(s)        Refills:  0          Substitutions Allowed     Route To [facility] #[id number]    Signed by Netta Srivastava CMA  4/28/2020 1:16:00 PM    ** Submitted: **  Complete:omeprazole (omeprazole 40 mg oral delayed release capsule)   Signed by Netta Srivastava CMA  4/28/2020 1:17:00 PM    ** Not Approved:  **  omeprazole (OMEPRAZOLE 40MG CAPSULES)  TAKE 1 CAPSULE BY MOUTH DAILY  Qty:  90 cap(s)        Days Supply:  90        Refills:  0          Substitutions Allowed     Route To [facility] #[id number]   Signed by Netta Srivastava CMA            ** Submitted: **  Order:lisinopril (lisinopril 40 mg oral tablet)  1 tab(s)  Oral  daily  Qty:  30 tab(s)        Refills:  0          Substitutions Allowed     Route To [facility] #[id number]    Signed by Netta Srivastava CMA  4/28/2020 1:16:00 PM    ** Submitted: **  Complete:lisinopril (lisinopril 40 mg oral tablet)   Signed by Netta Srivastava CMA  4/28/2020 1:16:00 PM    ** Not Approved:  **  lisinopril (LISINOPRIL 40MG TABLETS)  TAKE 1 TABLET BY MOUTH DAILY  Qty:  90 tab(s)        Days Supply:  90        Refills:  0          Substitutions Allowed     Route To [facility] #[id number]   Signed by Netta Srivastava CMA            ------------------------------------------  From: [id number]  To: Luisa Ramirez MD  Sent: April 28, 2020 5:18:15 AM CDT  Subject: Medication Management  Due: April 29, 2020 5:18:15 AM CDT    ** On Hold Pending Signature **  Drug: lisinopril (lisinopril 40 mg oral tablet)  TAKE 1 TABLET BY MOUTH DAILY  Quantity: 90 tab(s)  Days Supply: 90  Refills:  0  Substitutions Allowed  Notes from Pharmacy:     Dispensed Drug: lisinopril (lisinopril 40 mg oral tablet)  TAKE 1 TABLET BY MOUTH DAILY  Quantity: 90 tab(s)  Days Supply: 90  Refills: 0  Substitutions Allowed  Notes from Pharmacy:     ** On Hold Pending Signature **  Drug: omeprazole (omeprazole 40 mg oral delayed release capsule)  TAKE 1 CAPSULE BY MOUTH DAILY  Quantity: 90 cap(s)  Days Supply: 90  Refills: 0  Substitutions Allowed  Notes from Pharmacy:     Dispensed Drug: omeprazole (omeprazole 40 mg oral delayed release capsule)  TAKE 1 CAPSULE BY MOUTH DAILY  Quantity: 90 cap(s)  Days Supply: 90  Refills: 0  Substitutions Allowed  Notes from Pharmacy:   ------------------------------------------PCP: KIERAN  last filled: 2/3/2020 #90, 0 refills  last seen: 11/26/19 Gout  RTC: yes

## 2022-02-16 NOTE — PROGRESS NOTES
Chief Complaint    Patient presents for possible inflammed skin tags noticed the inflammation yesterday.  History of Present Illness       Patient is a 60-year-old male who comes in complaining of inflamed skin tags.       He notes that he has several skin tags but just yesterday noticed some redness or inflammation to one of the skin tags in his right axilla.       No pain.       He reports that he has an allergy appointment next Monday to evaluate the recent issues with hives he had the spring.  He has an EpiPen available now.  He stopped his lisinopril and started a different blood pressure medication.  Review of Systems       Negative except as listed in HPI  Physical Exam   Vitals & Measurements    T: 97.4  F (Tympanic)  HR: 83 (Peripheral)  BP: 130/70  SpO2: 97%     HT: 66 in  WT: 204.2 lb        Vitals noted and within normal limits.       Skin exam reveals multiple skin tags which are small around the neck area bilaterally and in both axillary areas.       On the right axilla there is a prominent skin tag that is approximately 4 mm long and erythematous.  It is nontender.  Has a thin stalk at the base.       Area cleaned with alcohol swab and sterile scissors used to clip the skin tag at its base.  Bleeding controlled with pressure and a silver nitrate stick.  Band-Aid placed  Assessment/Plan       Inflamed skin tag (L91.8)       Skin tag removed.       Watch for signs of infection including redness or drainage.  Follow-up if that occurs.       This should heal completely.  If he has any further skin lesion in this area we should consider a biopsy.       As far as his other skin tags I recommended he could use over-the-counter wart treatment like salicylic acid to the base of the skin take and that that should help him fall off.  Patient verbalized understanding and will follow up as needed.  Patient Information     Name:CANDICE FREY JR HARRIET      Address:       [address] [place] [id number]      Sex:Male     YOB: 1960     [phone]     Emergency Contact:IRAIS FREY     MRN:[id number]     FIN:[id number]     Location:[facility]     Date of Service:05/11/2021      Primary Care Physician:       Aydin Yu MD, [phone]      Attending Physician:       Zully Mclaughlin MD, [phone]  Problem List/Past Medical History    Ongoing     Allergic Rhinitis     Essential familial hyperlipidemia     GERD (Gastroesophageal Reflux Disease)     HTN (Hypertension)     Mixed hyperlipidemia     Obesity     Severe obstructive sleep apnea       Comments: Optimal CPAP titration to 7 cm water pressure on 4/1/2016 HST with AHI 36 and oximetry ankit 80% on 3/7/16     Shift work sleep disorder    Historical     Appendectomy     Bowel obstruction  Procedure/Surgical History     Polysomnography (03/07/2016)            Comments: AHI: 36.8.     Colonoscopy (10/30/2015)            Comments: Sedation: Fentanyl and Midazolam      Indication: Personal history of colon polyps      Findings: Single tubular adenoma no high grade dysplasia. Diverticula in the sigmoid colon.      F/U: Repeat colonoscopy in 5 years.     Colonoscopy (04/16/2010)            Comments: 3 polyps removed, 2 were adenoma's  repeat in 5 years.     Exploration of abdomen (07/15/1993)           Inguinal herniorrhaphy (01/24/1992)            Comments: Laparoscopic left with mesh..     Appendectomy (01/21/1986)           Exploration of abdomen (02/1960)            Comments: Diverticulosis vs intussusception.  Medications    AirDuo RespiClick 113 mcg-14 mcg/inh inhalation powder, See Instructions, 1 refills    albuterol 90 mcg/inh inhalation aerosol, 2 puff(s), Inhale, q4 hrs, 3 refills    aspirin 81 mg oral tablet, 81 mg= 1 tab(s), Oral, daily    CPAP 7 cm water pressure, See Instructions, 11 refills    EPINEPHrine 0.3 mg injectable kit, PRN    indomethacin 50 mg oral capsule, 50 mg= 1 cap(s), Oral, tid, PRN, 1 refills     losartan 100 mg oral tablet, 100 mg= 1 tab(s), Oral, daily, 3 refills    omeprazole 40 mg oral delayed release capsule, 1 cap(s), Oral, daily, 3 refills    rosuvastatin 20 mg oral tablet, 20 mg= 1 tab(s), Oral, daily, 3 refills    triamcinolone 0.1% topical cream, PRN  Allergies    lisinopril (Hives)    codeine (nausea)  Social History    Smoking Status     Never smoker     Alcohol      Current     Electronic Cigarette/Vaping      Electronic Cigarette Use: Never.     Employment/School      Employed, Work/School description: [employer]     Exercise     Home/Environment      Marital status:  (Living together). Spouse/Partner name: Liana. Lives with Children, Spouse. 4 children. Living situation: Home/Independent.     Nutrition/Health      Type of diet: Regular.     Sexual      Sexual orientation: Heterosexual.     Substance Abuse      Never     Tobacco      Never (less than 100 in lifetime)  Family History    Diabetes mellitus: Father.    Hypercholesterolemia: Father.  Immunizations      Vaccine Date Status          tetanus/diphth/pertuss (Tdap) adult/adol 02/04/2015 Given          influenza virus vaccine, inactivated 11/01/2013 Recorded              Comments : [facility]          influenza virus vaccine, inactivated 11/19/2009 Recorded          influenza virus vaccine, inactivated 11/07/2002 Recorded

## 2022-02-16 NOTE — NURSING NOTE
Hearing and Vision Screening Entered On:  10/15/2019 3:27 PM CDT    Performed On:  10/15/2019 3:26 PM CDT by Trina Patino MA               Hearing and Vision Screening   Audiogram Result Right Ear :   Fail   Audiogram Result Left Ear :   Fail   Hearing Screen Comments :   Missed 4000hz both ears   Trina aPtino MA - 10/15/2019 3:26 PM CDT

## 2022-02-16 NOTE — TELEPHONE ENCOUNTER
Entered by Aydin Yu MD on July 19, 2021 7:48:27 PM CDT  ---------------------  From: Aydin Yu MD   To: [facility] #[id number]    Sent: 7/19/2021 7:48:27 PM CDT  Subject: Medication Management     ** Submitted: **  Complete:rosuvastatin (rosuvastatin 20 mg oral tablet)   Signed by Aydin Yu MD  7/20/2021 12:48:00 AM [facility]    ** Submitted: **  Complete:omeprazole (omeprazole 40 mg oral delayed release capsule)   Signed by Aydin Yu MD  7/20/2021 12:48:00 AM [facility]    ** Approved with modifications: **  omeprazole (OMEPRAZOLE 40MG CAPSULES)  TAKE 1 CAPSULE BY MOUTH DAILY  Qty:  90 cap(s)        Days Supply:  90        Refills:  0          Substitutions Allowed     Route To [facility] #[id number]       ** Approved with modifications: **  rosuvastatin (ROSUVASTATIN 20MG TABLETS)  TAKE 1 TABLET BY MOUTH DAILY  Qty:  90 tab(s)        Days Supply:  90        Refills:  0          Substitutions Allowed     Route To [facility] #[id number]               ------------------------------------------  From: [facility] #[id number]  To: Aydin Yu MD  Sent: July 19, 2021 6:14:33 PM CDT  Subject: Medication Management  Due: July 17, 2021 12:54:51 PM CDT     ** On Hold Pending Signature **     Dispensed Drug: omeprazole (omeprazole 40 mg oral delayed release capsule), TAKE 1 CAPSULE BY MOUTH DAILY  Quantity: 90 cap(s)  Days Supply: 90  Refills: 0  Substitutions Allowed  Notes from Pharmacy:     ** On Hold Pending Signature **     Dispensed Drug: rosuvastatin (rosuvastatin 20 mg oral tablet), TAKE 1 TABLET BY MOUTH DAILY  Quantity: 90 tab(s)  Days Supply: 90  Refills: 0  Substitutions Allowed  Notes from Pharmacy:  ------------------------------------------

## 2022-02-16 NOTE — TELEPHONE ENCOUNTER
---------------------  From: Lexii Matos CMA   To: Travis Alas; [facility] Message Pool ([id number]);     Sent: 2/3/2021 8:42:25 AM CST  Subject: Covid results     PCP:   KIERAN, saw BAM       Time of Call:  0820       Person Calling:  Patient  Phone number:  [phone]    Note:   Patient called looking for covid results from 1/31/21. None found in chart.     Last office visit and reason:  1/31/21 cough w/ BAMCalled and left a detailed message on identified phone regarding pt's negative COVID results. Told pt to call the clinic back if he had any questions or concernsPt LM at 9:14am stating he missed a call.  Returned call and pt did receive VM with results. Pt asking for negative results to be mailed to him to give to his employer.  Education printed to be mailed to pt.

## 2022-02-16 NOTE — TELEPHONE ENCOUNTER
Entered by Zoë Gracia CMA on May 28, 2020 7:57:26 AM CDT  ---------------------  From: Zoë Gracia CMA   To: [facility] #[id number]    Sent: 5/28/2020 7:57:25 AM CDT  Subject: Medication Management     ** Not Approved: Patient has requested refill too soon, has enough until appt with [facility] **  omeprazole  TAKE 1 CAPSULE BY MOUTH DAILY  Qty:  30 cap(s)        Refills:  0          Substitutions Allowed     Details:  30 cap(s), TAKE 1 CAPSULE BY MOUTH DAILY, Route to Pharmacy Electronically [facility] #[id number], 5/28/2020, 4/28/2020, 30, Luisa Ramirez MD      Route To Pharmacy - [facility] #[id number]   Signed by Zoë Gracia CMA            ** Not Approved: Patient has requested refill too soon, Has enough until appt with [facility] **  lisinopril  TAKE 1 TABLET BY MOUTH DAILY  Qty:  30 tab(s)        Refills:  0          Substitutions Allowed     Details:  30 tab(s), TAKE 1 TABLET BY MOUTH DAILY, Route to Pharmacy Electronically [facility] #[id number], 5/28/2020, 4/28/2020, 30, Luisa Ramirez MD      Route To Pharmacy - [facility] #[id number]   Signed by Zoë Gracia CMA            ------------------------------------------  From: [facility] #[id number]  To: Luisa Ramirez MD  Sent: May 28, 2020 5:19:19 AM CDT  Subject: Medication Management  Due: May 28, 2020 5:41:18 PM CDT     ** On Hold Pending Signature **     Dispensed Drug: omeprazole (omeprazole 40 mg oral delayed release capsule), TAKE 1 CAPSULE BY MOUTH DAILY  Quantity: 30 cap(s)  Days Supply: 30  Refills: 0  Substitutions Allowed  Notes from Pharmacy:     ** On Hold Pending Signature **     Dispensed Drug: lisinopril (lisinopril 40 mg oral tablet), TAKE 1 TABLET BY MOUTH DAILY  Quantity: 30 tab(s)  Days Supply: 30  Refills: 0  Substitutions Allowed  Notes from Pharmacy:  ------------------------------------------

## 2022-02-16 NOTE — TELEPHONE ENCOUNTER
---------------------  From: Janice Guillory PA-C   To: Frederic Rodriguez Kaity;     Sent: 1/31/2021 11:39:19 AM CST  Subject: General Message     Addie, could you assist Andrew in finding an assistance plan or different combination inhaled corticosteroid/LABA as Dulera (prescribed by Dr. Yu) which has worked well for him is costing $300+ per inhaler and so he had stopped using.---------------------  From: Frederic Rodriguez Kaity   To: Janice Guillory PA-C;     Sent: 1/31/2021 7:11:31 PM CST  Subject: RE: General Message     Yes! Thanks for the referral (and for entering the consult order)! I'll look forward to speaking with him when he schedules.  KB

## 2022-02-16 NOTE — NURSING NOTE
Comprehensive Intake Entered On:  3/29/2019 10:49 AM CDT    Performed On:  3/29/2019 10:46 AM CDT by Trina Patino MA               Summary   Chief Complaint :   Tired and low back ache, tightness in legs x 2 weeks    Menstrual Status :   N/A   Weight Measured :   189.6 lb(Converted to: 189 lb 10 oz, 86.00 kg)    Height Measured :   66 in(Converted to: 5 ft 6 in, 167.64 cm)    Body Mass Index :   30.6 kg/m2 (HI)    Body Surface Area :   2 m2   Systolic Blood Pressure :   126 mmHg   Diastolic Blood Pressure :   74 mmHg   Mean Arterial Pressure :   91 mmHg   Peripheral Pulse Rate :   68 bpm   BP Site :   Left arm   Pulse Site :   Radial artery   BP Method :   Manual   HR Method :   Manual   Temperature Tympanic :   97.4 DegF(Converted to: 36.3 DegC)  (LOW)    Trina Patino MA - 3/29/2019 10:46 AM CDT   Health Status   Allergies Verified? :   Yes   Medication History Verified? :   Yes   Immunizations Current :   Other: Pt. checking work for record.   Medical History Verified? :   Yes   Pre-Visit Planning Status :   Completed   Tobacco Use? :   Never smoker   Trina Patino MA - 3/29/2019 10:46 AM CDT   Consents   Consent for Immunization Exchange :   Consent Granted   Consent for Immunizations to Providers :   Consent Granted   Trina Patino MA - 3/29/2019 10:46 AM CDT   Meds / Allergies   (As Of: 3/29/2019 10:49:52 AM CDT)   Allergies (Active)   codeine  Estimated Onset Date:   Unspecified ; Reactions:   nausea ; Created By:   Luisa Ramirez MD; Reaction Status:   Active ; Category:   Drug ; Substance:   codeine ; Type:   Allergy ; Updated By:   Luisa Ramirez MD; Reviewed Date:   3/29/2019 10:48 AM CDT        Medication List   (As Of: 3/29/2019 10:49:52 AM CDT)   Prescription/Discharge Order    Miscellaneous Rx Supply  :   Miscellaneous Rx Supply ; Status:   Prescribed ; Ordered As Mnemonic:   CPAP 7 cm water pressure ; Simple Display Line:   See Instructions, Heated humidifier, heated tubing, filters,  nasal or full face mask of choice with headgear.  Replacement cushions and supplies as needed., 1 EA, 11 Refill(s) ; Ordering Provider:   Luisa Ramirez MD; Catalog Code:   Miscellaneous Rx Supply ; Order Dt/Tm:   1/14/2019 8:55:38 AM          lisinopril  :   lisinopril ; Status:   Prescribed ; Ordered As Mnemonic:   lisinopril 40 mg oral tablet ; Simple Display Line:   40 mg, 1 tab(s), po, daily, 90 tab(s), 3 Refill(s) ; Ordering Provider:   Luisa Ramirez MD; Catalog Code:   lisinopril ; Order Dt/Tm:   1/14/2019 8:51:00 AM          omeprazole  :   omeprazole ; Status:   Prescribed ; Ordered As Mnemonic:   omeprazole 40 mg oral delayed release capsule ; Simple Display Line:   40 mg, 1 cap(s), po, daily, 90 cap(s), 3 Refill(s) ; Ordering Provider:   Luisa Ramirez MD; Catalog Code:   omeprazole ; Order Dt/Tm:   1/14/2019 8:50:58 AM          atorvastatin  :   atorvastatin ; Status:   Prescribed ; Ordered As Mnemonic:   atorvastatin 40 mg oral tablet ; Simple Display Line:   40 mg, 1 tab(s), po, daily, 90 tab(s), 3 Refill(s) ; Ordering Provider:   Luisa Ramirez MD; Catalog Code:   atorvastatin ; Order Dt/Tm:   1/14/2019 8:50:56 AM            Home Meds    omega-3 polyunsaturated fatty acids  :   omega-3 polyunsaturated fatty acids ; Status:   Documented ; Ordered As Mnemonic:   Lovaza ; Simple Display Line:   2,000 mg, po, bid, 0 Refill(s) ; Catalog Code:   omega-3 polyunsaturated fatty acids ; Order Dt/Tm:   9/7/2017 1:15:04 PM          aspirin  :   aspirin ; Status:   Documented ; Ordered As Mnemonic:   aspirin 81 mg oral tablet ; Simple Display Line:   81 mg, 1 tab(s), PO, Daily ; Catalog Code:   aspirin ; Order Dt/Tm:   1/7/2010 1:05:19 PM

## 2022-03-25 ENCOUNTER — DOCUMENTATION ONLY (OUTPATIENT)
Dept: LAB | Facility: CLINIC | Age: 62
End: 2022-03-25
Payer: COMMERCIAL

## 2022-03-25 DIAGNOSIS — I10 PRIMARY HYPERTENSION: ICD-10-CM

## 2022-03-25 DIAGNOSIS — E78.2 MIXED HYPERLIPIDEMIA: Primary | ICD-10-CM

## 2022-03-25 PROBLEM — J30.9 ALLERGIC RHINITIS: Status: ACTIVE | Noted: 2022-03-25

## 2022-03-25 PROBLEM — E66.9 OBESITY: Status: ACTIVE | Noted: 2022-03-25

## 2022-03-25 PROBLEM — E78.49 FAMILIAL COMBINED HYPERLIPIDEMIA: Status: ACTIVE | Noted: 2022-03-25

## 2022-03-25 PROBLEM — G47.20 BIOLOGICAL CLOCK DISTURBANCE: Status: ACTIVE | Noted: 2022-03-25

## 2022-03-28 ENCOUNTER — LAB (OUTPATIENT)
Dept: LAB | Facility: CLINIC | Age: 62
End: 2022-03-28
Payer: COMMERCIAL

## 2022-03-28 DIAGNOSIS — E78.2 MIXED HYPERLIPIDEMIA: ICD-10-CM

## 2022-03-28 DIAGNOSIS — I10 PRIMARY HYPERTENSION: ICD-10-CM

## 2022-03-28 LAB
ANION GAP SERPL CALCULATED.3IONS-SCNC: 9 MMOL/L (ref 3–14)
BUN SERPL-MCNC: 25 MG/DL (ref 7–30)
CALCIUM SERPL-MCNC: 9.4 MG/DL (ref 8.5–10.1)
CHLORIDE BLD-SCNC: 106 MMOL/L (ref 94–109)
CHOLEST SERPL-MCNC: 120 MG/DL
CO2 SERPL-SCNC: 25 MMOL/L (ref 20–32)
CREAT SERPL-MCNC: 0.98 MG/DL (ref 0.66–1.25)
ERYTHROCYTE [DISTWIDTH] IN BLOOD BY AUTOMATED COUNT: 13.4 % (ref 10–15)
FASTING STATUS PATIENT QL REPORTED: ABNORMAL
GFR SERPL CREATININE-BSD FRML MDRD: 87 ML/MIN/1.73M2
GLUCOSE BLD-MCNC: 83 MG/DL (ref 70–99)
HCT VFR BLD AUTO: 40.3 % (ref 40–53)
HDLC SERPL-MCNC: 34 MG/DL
HGB BLD-MCNC: 13.3 G/DL (ref 13.3–17.7)
LDLC SERPL CALC-MCNC: 38 MG/DL
MCH RBC QN AUTO: 28.5 PG (ref 26.5–33)
MCHC RBC AUTO-ENTMCNC: 33 G/DL (ref 31.5–36.5)
MCV RBC AUTO: 86 FL (ref 78–100)
NONHDLC SERPL-MCNC: 86 MG/DL
PLATELET # BLD AUTO: 263 10E3/UL (ref 150–450)
POTASSIUM BLD-SCNC: 4 MMOL/L (ref 3.4–5.3)
RBC # BLD AUTO: 4.67 10E6/UL (ref 4.4–5.9)
SODIUM SERPL-SCNC: 140 MMOL/L (ref 133–144)
TRIGL SERPL-MCNC: 242 MG/DL
WBC # BLD AUTO: 7.5 10E3/UL (ref 4–11)

## 2022-03-28 PROCEDURE — 80048 BASIC METABOLIC PNL TOTAL CA: CPT

## 2022-03-28 PROCEDURE — 36415 COLL VENOUS BLD VENIPUNCTURE: CPT

## 2022-03-28 PROCEDURE — 80061 LIPID PANEL: CPT

## 2022-03-28 PROCEDURE — 85027 COMPLETE CBC AUTOMATED: CPT

## 2022-03-31 DIAGNOSIS — E78.5 HYPERLIPIDEMIA: ICD-10-CM

## 2022-03-31 DIAGNOSIS — I10 HTN (HYPERTENSION): ICD-10-CM

## 2022-03-31 DIAGNOSIS — K21.9 GERD (GASTROESOPHAGEAL REFLUX DISEASE): Primary | ICD-10-CM

## 2022-03-31 NOTE — TELEPHONE ENCOUNTER
Reason for Call:  Medication or medication refill:    Do you use a [facility]?  Name of the pharmacy and phone number for the current request:  [facility] [place], [place]    Name of the medication requested: losartan, rovastatin, and omemprazole    Other request: [facility] faxing orders as well    Can we leave a detailed message on this number? YES    Phone number patient can be reached at: Cell number on file:    Telephone Information:   Mobile [phone]       Best Time: anytime    Call taken on 3/31/2022 at 3:41 PM by Lory Hubbard

## 2022-04-04 RX ORDER — OMEPRAZOLE 40 MG/1
40 CAPSULE, DELAYED RELEASE ORAL DAILY
Qty: 90 CAPSULE | Refills: 0 | Status: SHIPPED | OUTPATIENT
Start: 2022-04-04 | End: 2022-09-23

## 2022-04-04 RX ORDER — ROSUVASTATIN CALCIUM 20 MG/1
20 TABLET, COATED ORAL DAILY
Qty: 90 TABLET | Refills: 0 | Status: SHIPPED | OUTPATIENT
Start: 2022-04-04 | End: 2022-07-20

## 2022-04-04 RX ORDER — LOSARTAN POTASSIUM 100 MG/1
100 TABLET ORAL DAILY
Qty: 90 TABLET | Refills: 0 | Status: SHIPPED | OUTPATIENT
Start: 2022-04-04 | End: 2022-09-23

## 2022-07-20 DIAGNOSIS — E78.5 HYPERLIPIDEMIA: ICD-10-CM

## 2022-07-20 RX ORDER — ROSUVASTATIN CALCIUM 20 MG/1
TABLET, COATED ORAL
Qty: 90 TABLET | Refills: 0 | Status: SHIPPED | OUTPATIENT
Start: 2022-07-20 | End: 2022-09-23

## 2022-07-20 NOTE — TELEPHONE ENCOUNTER
"Prescription approved per [facility] Refill Protocol.    Last Written Prescription Date:  12/21/2021  Last Fill Quantity: 90,  # refills: 0   Last office visit provider:  10/5/2021     Requested Prescriptions   Pending Prescriptions Disp Refills     rosuvastatin (CRESTOR) 20 MG tablet [Pharmacy Med Name: Rosuvastatin Calcium 20 MG Oral Tablet] 90 tablet 0     Sig: Take 1 tablet by mouth once daily       Statins Protocol Failed - 7/20/2022  6:58 AM        Failed - Recent (12 mo) or future (30 days) visit within the authorizing provider's specialty     Patient has had an office visit with the authorizing provider or a provider within the authorizing providers department within the previous 12 mos or has a future within next 30 days. See \"Patient Info\" tab in inbasket, or \"Choose Columns\" in Meds & Orders section of the refill encounter.              Passed - LDL on file in past 12 months     Recent Labs   Lab Test 03/28/22  1408   LDL 38             Passed - No abnormal creatine kinase in past 12 months     No lab results found.             Passed - Medication is active on med list        Passed - Patient is age 18 or older             Babatunde Gaines RN 07/20/22 2:48 PM  "

## 2022-09-08 ENCOUNTER — ANCILLARY PROCEDURE (OUTPATIENT)
Dept: GENERAL RADIOLOGY | Facility: CLINIC | Age: 62
End: 2022-09-08
Attending: FAMILY MEDICINE
Payer: COMMERCIAL

## 2022-09-08 ENCOUNTER — OFFICE VISIT (OUTPATIENT)
Dept: FAMILY MEDICINE | Facility: CLINIC | Age: 62
End: 2022-09-08

## 2022-09-08 VITALS
DIASTOLIC BLOOD PRESSURE: 80 MMHG | TEMPERATURE: 98.6 F | SYSTOLIC BLOOD PRESSURE: 130 MMHG | BODY MASS INDEX: 31.31 KG/M2 | WEIGHT: 194 LBS | HEART RATE: 80 BPM

## 2022-09-08 DIAGNOSIS — Z91.81 PERSONAL HISTORY OF FALL: Primary | ICD-10-CM

## 2022-09-08 DIAGNOSIS — R60.0 PERIPHERAL EDEMA: ICD-10-CM

## 2022-09-08 DIAGNOSIS — R14.0 ABDOMINAL BLOATING: ICD-10-CM

## 2022-09-08 DIAGNOSIS — Z91.81 PERSONAL HISTORY OF FALL: ICD-10-CM

## 2022-09-08 PROBLEM — S27.1XXA TRAUMATIC HEMOTHORAX: Status: ACTIVE | Noted: 2022-08-02

## 2022-09-08 PROBLEM — J90 PLEURAL EFFUSION: Status: ACTIVE | Noted: 2022-08-02

## 2022-09-08 PROBLEM — S22.5XXA: Status: ACTIVE | Noted: 2022-07-28

## 2022-09-08 LAB
ALBUMIN SERPL BCG-MCNC: 4.7 G/DL (ref 3.5–5.2)
ALP SERPL-CCNC: 89 U/L (ref 40–129)
ALT SERPL W P-5'-P-CCNC: 15 U/L (ref 10–50)
ANION GAP SERPL CALCULATED.3IONS-SCNC: 9 MMOL/L (ref 7–15)
AST SERPL W P-5'-P-CCNC: 25 U/L (ref 10–50)
BILIRUB DIRECT SERPL-MCNC: <0.2 MG/DL (ref 0–0.3)
BILIRUB SERPL-MCNC: 0.3 MG/DL
BUN SERPL-MCNC: 12.7 MG/DL (ref 8–23)
CALCIUM SERPL-MCNC: 10.5 MG/DL (ref 8.8–10.2)
CHLORIDE SERPL-SCNC: 103 MMOL/L (ref 98–107)
CREAT SERPL-MCNC: 0.97 MG/DL (ref 0.67–1.17)
DEPRECATED HCO3 PLAS-SCNC: 29 MMOL/L (ref 22–29)
ERYTHROCYTE [DISTWIDTH] IN BLOOD BY AUTOMATED COUNT: 13.6 % (ref 10–15)
GFR SERPL CREATININE-BSD FRML MDRD: 88 ML/MIN/1.73M2
GLUCOSE SERPL-MCNC: 85 MG/DL (ref 70–99)
HCT VFR BLD AUTO: 36.7 % (ref 40–53)
HGB BLD-MCNC: 12.1 G/DL (ref 13.3–17.7)
MCH RBC QN AUTO: 27.8 PG (ref 26.5–33)
MCHC RBC AUTO-ENTMCNC: 33 G/DL (ref 31.5–36.5)
MCV RBC AUTO: 84 FL (ref 78–100)
PLATELET # BLD AUTO: 258 10E3/UL (ref 150–450)
POTASSIUM SERPL-SCNC: 4.2 MMOL/L (ref 3.4–5.3)
PROT SERPL-MCNC: 7.5 G/DL (ref 6.4–8.3)
RBC # BLD AUTO: 4.36 10E6/UL (ref 4.4–5.9)
SODIUM SERPL-SCNC: 141 MMOL/L (ref 136–145)
WBC # BLD AUTO: 7 10E3/UL (ref 4–11)

## 2022-09-08 PROCEDURE — 36415 COLL VENOUS BLD VENIPUNCTURE: CPT | Performed by: FAMILY MEDICINE

## 2022-09-08 PROCEDURE — 80053 COMPREHEN METABOLIC PANEL: CPT | Performed by: FAMILY MEDICINE

## 2022-09-08 PROCEDURE — 85027 COMPLETE CBC AUTOMATED: CPT | Performed by: FAMILY MEDICINE

## 2022-09-08 PROCEDURE — 74019 RADEX ABDOMEN 2 VIEWS: CPT | Mod: TC | Performed by: RADIOLOGY

## 2022-09-08 PROCEDURE — 82248 BILIRUBIN DIRECT: CPT | Performed by: FAMILY MEDICINE

## 2022-09-08 PROCEDURE — 99214 OFFICE O/P EST MOD 30 MIN: CPT | Performed by: FAMILY MEDICINE

## 2022-09-08 RX ORDER — LORATADINE 10 MG/1
10 TABLET ORAL
COMMUNITY
Start: 2022-08-05 | End: 2023-01-25

## 2022-09-08 RX ORDER — FUROSEMIDE 20 MG
20 TABLET ORAL DAILY
Qty: 7 TABLET | Refills: 0 | Status: SHIPPED | OUTPATIENT
Start: 2022-09-08 | End: 2022-10-11

## 2022-09-08 RX ORDER — POTASSIUM CHLORIDE 1500 MG/1
20 TABLET, EXTENDED RELEASE ORAL DAILY
COMMUNITY
Start: 2022-08-25 | End: 2022-09-23

## 2022-09-08 RX ORDER — POTASSIUM CHLORIDE 1500 MG/1
20 TABLET, EXTENDED RELEASE ORAL DAILY
Qty: 7 TABLET | Refills: 0 | Status: SHIPPED | OUTPATIENT
Start: 2022-09-08 | End: 2022-10-11

## 2022-09-08 RX ORDER — GABAPENTIN 300 MG/1
300 CAPSULE ORAL 3 TIMES DAILY
COMMUNITY
Start: 2022-08-27 | End: 2022-10-03 | Stop reason: ALTCHOICE

## 2022-09-08 RX ORDER — OXYCODONE HYDROCHLORIDE 5 MG/1
5 TABLET ORAL EVERY 4 HOURS PRN
COMMUNITY
Start: 2022-09-02 | End: 2022-09-09

## 2022-09-08 RX ORDER — EPINEPHRINE 0.3 MG/.3ML
0.3 INJECTION SUBCUTANEOUS PRN
COMMUNITY
Start: 2021-03-27 | End: 2023-03-02

## 2022-09-08 RX ORDER — NAPROXEN 500 MG/1
500 TABLET ORAL 2 TIMES DAILY WITH MEALS
COMMUNITY
Start: 2022-08-04 | End: 2022-10-25

## 2022-09-08 RX ORDER — FUROSEMIDE 20 MG
20 TABLET ORAL DAILY
COMMUNITY
Start: 2022-08-25 | End: 2022-09-23

## 2022-09-08 RX ORDER — TRIAMCINOLONE ACETONIDE 1 MG/G
CREAM TOPICAL
COMMUNITY
Start: 2021-03-27 | End: 2022-10-25

## 2022-09-08 RX ORDER — CETIRIZINE HYDROCHLORIDE 10 MG/1
10 TABLET ORAL DAILY
COMMUNITY
Start: 2021-11-04

## 2022-09-08 RX ORDER — LIDOCAINE 4 G/G
2 PATCH TOPICAL
COMMUNITY
Start: 2022-08-11 | End: 2022-09-08

## 2022-09-08 RX ORDER — METHOCARBAMOL 500 MG/1
500 TABLET, FILM COATED ORAL 3 TIMES DAILY PRN
COMMUNITY
Start: 2022-09-02 | End: 2022-10-11

## 2022-09-08 RX ORDER — ACETAMINOPHEN 500 MG
1000 TABLET ORAL EVERY 6 HOURS PRN
COMMUNITY
Start: 2022-08-04

## 2022-09-08 ASSESSMENT — ASTHMA QUESTIONNAIRES
QUESTION_1 LAST FOUR WEEKS HOW MUCH OF THE TIME DID YOUR ASTHMA KEEP YOU FROM GETTING AS MUCH DONE AT WORK, SCHOOL OR AT HOME: NONE OF THE TIME
QUESTION_4 LAST FOUR WEEKS HOW OFTEN HAVE YOU USED YOUR RESCUE INHALER OR NEBULIZER MEDICATION (SUCH AS ALBUTEROL): NOT AT ALL
ACT_TOTALSCORE: 24
QUESTION_3 LAST FOUR WEEKS HOW OFTEN DID YOUR ASTHMA SYMPTOMS (WHEEZING, COUGHING, SHORTNESS OF BREATH, CHEST TIGHTNESS OR PAIN) WAKE YOU UP AT NIGHT OR EARLIER THAN USUAL IN THE MORNING: NOT AT ALL
QUESTION_2 LAST FOUR WEEKS HOW OFTEN HAVE YOU HAD SHORTNESS OF BREATH: ONCE OR TWICE A WEEK
ACT_TOTALSCORE: 24
QUESTION_5 LAST FOUR WEEKS HOW WOULD YOU RATE YOUR ASTHMA CONTROL: COMPLETELY CONTROLLED

## 2022-09-08 ASSESSMENT — PATIENT HEALTH QUESTIONNAIRE - PHQ9
SUM OF ALL RESPONSES TO PHQ QUESTIONS 1-9: 9
SUM OF ALL RESPONSES TO PHQ QUESTIONS 1-9: 9
10. IF YOU CHECKED OFF ANY PROBLEMS, HOW DIFFICULT HAVE THESE PROBLEMS MADE IT FOR YOU TO DO YOUR WORK, TAKE CARE OF THINGS AT HOME, OR GET ALONG WITH OTHER PEOPLE: SOMEWHAT DIFFICULT

## 2022-09-08 NOTE — LETTER
September 9, 2022      Andrew Momindo Hamilton   [address] [facility] [address]        Dear ,    We are writing to inform you of your test results.    Your test results fall within the expected range(s) or remain unchanged from previous results.  Please continue with current treatment plan.    Resulted Orders   CBC with platelets   Result Value Ref Range    WBC Count 7.0 4.0 - 11.0 10e3/uL    RBC Count 4.36 (L) 4.40 - 5.90 10e6/uL    Hemoglobin 12.1 (L) 13.3 - 17.7 g/dL    Hematocrit 36.7 (L) 40.0 - 53.0 %    MCV 84 78 - 100 fL    MCH 27.8 26.5 - 33.0 pg    MCHC 33.0 31.5 - 36.5 g/dL    RDW 13.6 10.0 - 15.0 %    Platelet Count 258 150 - 450 10e3/uL   Basic metabolic panel  (Ca, Cl, CO2, Creat, Gluc, K, Na, BUN)   Result Value Ref Range    Creatinine 0.97 0.67 - 1.17 mg/dL    Sodium 141 136 - 145 mmol/L    Potassium 4.2 3.4 - 5.3 mmol/L    Urea Nitrogen 12.7 8.0 - 23.0 mg/dL    Chloride 103 98 - 107 mmol/L    Carbon Dioxide (CO2) 29 22 - 29 mmol/L    Anion Gap 9 7 - 15 mmol/L    Glucose 85 70 - 99 mg/dL    GFR Estimate 88 >60 mL/min/1.73m2      Comment:      Effective December 21, 2021 eGFRcr in adults is calculated using the 2021 CKD-EPI creatinine equation which includes age and gender (Kvng et al., NEJM, DOI: 10.1056/DAVEtv9282430)    Calcium 10.5 (H) 8.8 - 10.2 mg/dL   Hepatic function panel   Result Value Ref Range    Protein Total 7.5 6.4 - 8.3 g/dL    Albumin 4.7 3.5 - 5.2 g/dL    Bilirubin Total 0.3 <=1.2 mg/dL    Alkaline Phosphatase 89 40 - 129 U/L    AST 25 10 - 50 U/L    ALT 15 10 - 50 U/L    Bilirubin Direct <0.20 0.00 - 0.30 mg/dL       If you have any questions or concerns, please call the clinic at the number listed above.       Sincerely,      Aydin Yu MD

## 2022-09-08 NOTE — PROGRESS NOTES
"  Assessment & Plan     Personal history of fall  Slowly healin  - XR Chest 2 Views; Future  - XR Abdomen 2 Views; Future  - CBC with platelets; Future  - Basic metabolic panel  (Ca, Cl, CO2, Creat, Gluc, K, Na, BUN); Future    Will increase activity    Peripheral edema  Not improved  - furosemide (LASIX) 20 MG tablet; Take 1 tablet (20 mg) by mouth daily  - potassium chloride ER (K-TAB) 20 MEQ CR tablet; Take 1 tablet (20 mEq) by mouth daily  - CBC with platelets; Future  - Basic metabolic panel  (Ca, Cl, CO2, Creat, Gluc, K, Na, BUN); Future    Abdominal bloating  Most likely related to narcotics  - CBC with platelets; Future  - Basic metabolic panel  (Ca, Cl, CO2, Creat, Gluc, K, Na, BUN); Future  Will add miralax daily        30 minutes spent on the date of the encounter doing chart review, history and exam, documentation and further activities per the note       BMI:   Estimated body mass index is 31.31 kg/m  as calculated from the following:    Height as of 11/4/21: 1.676 m (5' 6\").    Weight as of this encounter: 88 kg (194 lb).       F/U [place] next week    Aydin Yu MD  [facility]    Sumeet Morales is a 62 year old, presenting for the following health issues:  Gastrointestinal Problem and Leg Swelling      History of Present Illness       Reason for visit:  Broken ribs  Symptom onset:  3-7 days ago    He eats 0-1 servings of fruits and vegetables daily.He consumes 0 sweetened beverage(s) daily.He exercises with enough effort to increase his heart rate 30 to 60 minutes per day.  He exercises with enough effort to increase his heart rate 4 days per week.   He is taking medications regularly.    Today's PHQ-9         PHQ-9 Total Score: 9    PHQ-9 Q9 Thoughts of better off dead/self-harm past 2 weeks :   Not at all    How difficult have these problems made it for you to do your work, take care of things at home, or get along with other people: Somewhat difficult   "   Abdominal bloating - appetite normal and normal BMs. 6 weeks ago had fall and ribs broken - punctured lung and abdomen. Trouble with lower legs swelling.           Review of Systems   Constitutional, HEENT, cardiovascular, pulmonary, gi and gu systems are negative, except as otherwise noted.      Objective    /80   Pulse 80   Temp 98.6  F (37  C) (Temporal)   Wt 88 kg (194 lb)   BMI 31.31 kg/m    Body mass index is 31.31 kg/m .  Physical Exam   GENERAL: healthy, alert and no distress  NECK: no adenopathy, no asymmetry, masses, or scars and thyroid normal to palpation  RESP: lungs clear to auscultation - no rales, rhonchi or wheezes  CV: regular rate and rhythm, normal S1 S2, no S3 or S4, no murmur, click or rub, no peripheral edema and peripheral pulses strong  ABDOMEN: soft, nontender, no hepatosplenomegaly, no masses and bowel sounds normal  MS: no gross musculoskeletal defects noted, no edema

## 2022-09-14 ENCOUNTER — TELEPHONE (OUTPATIENT)
Dept: FAMILY MEDICINE | Facility: CLINIC | Age: 62
End: 2022-09-14

## 2022-09-14 NOTE — TELEPHONE ENCOUNTER
Reason for Call:  Other appointment    Detailed comments: Patient was seen with [facility] and was told yesterday he can do his follow ups with his primary with Yolanda he started a new medication and has 2 weeks worth he is wondering for a follow up he scheduled for 9/23/22 is this ok date for him to follow up or should he wait a bit longer please call patient to let him know either way please     Phone Number Patient can be reached at: Cell number on file:    Telephone Information:   Mobile [phone]       Best Time: any    Can we leave a detailed message on this number? YES    Call taken on 9/14/2022 at 8:17 AM by Marita Buenrostro

## 2022-09-19 NOTE — PROGRESS NOTES
Patient:   CANDICE FREY JR            MRN: [id number]            FIN: [id number]               Age:   59 years     Sex:  Male     :  1960   Associated Diagnoses:   Fever; Headache; Pneumonia   Author:   Braxton Hunter PA-C      Visit Information      Date of Service: 2019 03:49 pm  Performing Location: [facility]  Encounter#: [id number]      Primary Care Provider (PCP):  Aydin Yu MD    NPI# [id number]      Referring Provider:  Braxton Hunter PA-C    NPI# [id number]   Visit type:  New symptom.    Source of history:  Self, Medical record.    History limitation:  None.       Chief Complaint   2019 4:06 PM CDT    c/o cough, hurting ribs, HA, chills  x 3 days; using thera-flu and advil      History of Present Illness             The patient presents with cough.  The cough is described as hacking.  The severity of the cough is moderate.  The cough is episodic, fluctuates in intensity and is worsening.  The cough has lasted for 4 day(s).  The context of the cough: occurred in association with illness.  Associated symptoms consist of chills, fever, Burning in chest. HA from coughing.  and denies shortness of breath.  Wife and grand daughter are both ill with respiratory illnesses..        Review of Systems   Eye:  Negative.    Ear/Nose/Mouth/Throat:  Negative except as documented in history of present illness.    Respiratory:  Cough.    Cardiovascular:  Negative.       Health Status   Allergies:    Allergic Reactions (All)  Severity Not Documented  Codeine (Nausea)   Medications:  (Selected)   Prescriptions  Prescribed  CPAP 7 cm water pressure: CPAP 7 cm water pressure, See Instructions, Instructions: Heated humidifier, heated tubing, filters, nasal or full face mask of choice with headgear.  Replacement cushions and supplies as needed., Supply, # 1 EA, 11 Refill(s), Type: Maintenance  lisinopril 40 mg oral tablet: = 1 tab(s) ( 40 mg ), po, daily, # 90 tab(s), 3 Refill(s), Type:  Initial Psychiatric History and Physical    Emma Garcia  [id number]  9/16/2022 09/19/22    ID: Patient is a 77 yrs y.o. female    CC:\"I want to get up\"    HPI: Emma Garcia is a 77 y.o.  female  who presents with with advanced dementia who is a resident of a nursing home. She was found to be altered than usual and was then transferred to the ER for further work-up. Please be noted there is no family member or caregiver at the bedside to corroborate any history. Psychiatry consulted by Dr Mary Head due to \"hx of advanced demtia, came for UTI, agitation requring sitter, may need scheduled medications. Met with patient at bedside.  sitter at bedside. She is alert to name only. When asked further questions she responds \"I don't know. \" When ending assessment, she responds, \"I will call you tomorrow and let you know how things are going. \" Obviously she is a poor historian due to demntia hx. She is even more confused regarding UTI and has been agitated, attempting to get out of her bed. She has a  sitter who is able to redirect and help her be more comfortable. Insight and judgment are impaired. Per Oli Hidden, patient has been agitated requiring sitter. She did need to wake her up this am to take medications and did obtain haldol at 0623 for agitation. Per review of mar patient required zyprexa yesterday at [phone]. Past Psychiatric History:   [facility]BU- 3/21      Psychiatric Medication hx  Doxepin 10 mg po at bedtime  Namenda 5 mg po daily  Depakote sprinkles, 500 mg po at bedtime  Depakote sprinkles 250 mg po daily  Buspirone 5 mg tid  Family Psychiatric History:   History reviewed. No pertinent family history. Allergies:   Allergies   Allergen Reactions    Ativan [Lorazepam] Other (See Comments)     Psychotic Reaction/Combative    Vicodin [Hydrocodone-Acetaminophen] Other (See Comments)     Psychotic reaction    Valium [Diazepam] Other (See Comments)     Ineffective Maintenance, Pharmacy: [facility] [id number] [facility], hold on file - will contact when refills are needed, 1 tab(s) Oral daily  omega-3 polyunsaturated fatty acids 1000 mg oral capsule: = 2 cap(s) ( 2,000 mg ), Oral, bid, # 360 cap(s), 1 Refill(s), Type: Maintenance, Pharmacy: [id number], 2 cap(s) Oral bid  omeprazole 40 mg oral delayed release capsule: = 1 cap(s), Oral, daily, # 90 cap(s), 1 Refill(s), Type: Maintenance, Pharmacy: [id number]  Documented Medications  Documented  aspirin 81 mg oral tablet: 1 tab(s) ( 81 mg ), PO, Daily, 0,    Medications          *denotes recorded medication          CPAP 7 cm water pressure: See Instructions, Heated humidifier, heated tubing, filters, nasal or full face mask of choice with headgear.  Replacement cushions and supplies as needed., 1 EA, 11 Refill(s).          *aspirin 81 mg oral tablet: 81 mg, 1 tab(s), PO, Daily.          lisinopril 40 mg oral tablet: 40 mg, 1 tab(s), po, daily, 90 tab(s), 3 Refill(s).          omega-3 polyunsaturated fatty acids 1000 mg oral capsule: 2,000 mg, 2 cap(s), Oral, bid, 360 cap(s), 1 Refill(s).          omeprazole 40 mg oral delayed release capsule: 1 cap(s), Oral, daily, 90 cap(s), 1 Refill(s).       Problem list:    All Problems  Shift work sleep disorder / SNOMED CT 701152805 / Confirmed  Severe obstructive sleep apnea / SNOMED CT 158199108 / Confirmed  Obesity / SNOMED CT 6892742087 / Probable  Mixed hyperlipidemia / SNOMED CT 918346339 / Confirmed  HTN (Hypertension) / ICD-9-.9 / Confirmed  GERD (Gastroesophageal Reflux Disease) / ICD-9-.81 / Confirmed  Essential familial hyperlipidemia / ICD-9-.2 / Confirmed  Allergic Rhinitis / ICD-9-.9 / Confirmed  Resolved: Bowel obstruction / SNOMED CT 613368991  Resolved: Appendectomy / SNOMED CT 921186574      Histories   Past Medical History:    Resolved  Bowel obstruction ([id number]): Onset in 1989 at 28 years.  Resolved.  Appendectomy (451607302):  OBJECTIVE  Vital Signs:  Vitals:    09/19/22 0744   BP: 119/80   Pulse: 68   Resp: 16   Temp: 97.5 °F (36.4 °C)   SpO2: 99%       Labs:  Recent Results (from the past 48 hour(s))   Renal Function Panel    Collection Time: 09/18/22  3:42 AM   Result Value Ref Range    Sodium 146 (H) 135 - 145 MMOL/L    Potassium 3.6 3.5 - 5.1 MMOL/L    Chloride 110 99 - 110 mMol/L    CO2 26 21 - 32 MMOL/L    Anion Gap 10 4 - 16    BUN 10 6 - 23 MG/DL    Creatinine 0.5 (L) 0.6 - 1.1 MG/DL    Glucose 77 70 - 99 MG/DL    Calcium 8.6 8.3 - 10.6 MG/DL    GFR Non-African American >60 >60 mL/min/1.73m2    GFR African American >60 >60 mL/min/1.73m2    Albumin 3.8 3.4 - 5.0 GM/DL    Phosphorus 3.1 2.5 - 4.9 MG/DL   Magnesium    Collection Time: 09/18/22  3:42 AM   Result Value Ref Range    Magnesium 2.5 (H) 1.8 - 2.4 mg/dl   POCT Glucose    Collection Time: 09/18/22 11:06 AM   Result Value Ref Range    POC Glucose 74 70 - 99 MG/DL       Review of Systems:  Reports of no current cardiovascular, respiratory, gastrointestinal, genitourinary, integumentary, neurological, muscuoskeletal, or immunological symptoms today. PSYCHIATRIC: See HPI above.     PSYCHIATRIC EXAMINATION / MENTAL STATUS EXAM    CONSTITUTIONAL:    Vitals:   Vitals:    09/19/22 0744   BP: 119/80   Pulse: 68   Resp: 16   Temp: 97.5 °F (36.4 °C)   SpO2: 99%      General appearance: [x] appears age, []  appears older than stated age,               [x]  adequately dressed and groomed, [] disheveled,               []  in no acute distress, [x] appears mildly distressed, [] other           MUSCULOSKELETAL:   Gait:   [] normal, [] antalgic, [] unsteady, [x] gait not evaluated   Station:             [] erect, [] sitting, [x] recumbent, [] other        Strength/tone:  [x] muscle strength and tone appear consistent with age and                                        condition     [] atrophy      [] abnormal movements     Vitals: Blood pressure 119/80, pulse 68, temperature 97.5 °F (36.4 °C), temperature source Axillary, resp. rate 16, height 5' 2.99\" (1.6 m), weight 90 lb 11.2 oz (41.1 kg), SpO2 99 %. CONSTITUTIONAL:    Appearance: appears stated age. alert and oriented to person, disoriented to place, time & situation. no acute distress. Adequate grooming and hygeine. Good intermittent eye contact. No prominent physical abnormalities. Attitude: Manner is cooperative and pleasant but confused  Motor: Noted psychomotor agitation, picking at depends often, no retardation or abnormal movements noted  Speech: not Clearly articulated; decreased rate, volume, tone & amount. Language: intact understanding and production  Mood: good  Affect: agitated  Thought Production: Spontaneous. Thought Form: not always Coherent, linear, logical & goal-directed. No tangentiality or circumstantiality. No flight of ideas or loosening of associations. Thought Content/Perceptions: No SANA, no AVH, no delusion  Insight:impaired  Judgment- questionable  Memory: Immediate, recent, and remote appear impaired, though not formally tested.   Attention: maintained throughout interview  Fund of knowledge: Average  Gait/Balance: YAZ    Impression:   Delirium 2/2 UTI  Subcortical vascular dementia with behavioral disturbance  Other sequelae of other cerebrovascular disease    Problem List:   UTI (urinary tract infection)    Plan:   Delirium precautions- Discussed with patient's nurse Pamela Zamarripa   Standard Delirium precautions:  o Redirect / reorient / reassure the patient  o Early mobilization (please allow patient to walk halls with assistance if needed)   o Reduce noise and provide adequate daytime light in the room; pascual-side room preferable if available  o Prevent sleep deprivation  o Minimize use of anticholinergic drugs, benzodiazepines, and narcotics  o Use of eyeglasses and hearing aids  o Treat volume depletion  o Bowel regimens  o Avoid lines or catheters if possible  o Monitor for unintentional self-harm  o Assess Onset in 1986 at 25 years.  Resolved.   Family History:    Diabetes mellitus  Father  Hypercholesterolemia  Father     Procedure history:    Polysomnography ([id number]) on 3/7/2016 at 55 Years.  Comments:  3/23/2016 2:33 PM CDT - Azalea DIAZ Serena  AHI: 36.8  Colonoscopy ([id number]) on 10/30/2015 at 55 Years.  Comments:  11/17/2015 4:44 PM CST - Debra Liang RN  Sedation: Fentanyl and Midazolam  Indication: Personal history of colon polyps  Findings: Single tubular adenoma no high grade dysplasia. Diverticula in the sigmoid colon.  F/U: Repeat colonoscopy in 5 years  Colonoscopy ([id number]) on 4/16/2010 at 49 Years.  Comments:  4/30/2012 9:39 AM CDT - Slime Hummel MA  3 polyps removed, 2 were adenoma's  repeat in 5 years  Exploration of abdomen ([id number]) on 7/15/1993 at 32 Years.  Inguinal herniorrhaphy ([id number]) on 1/24/1992 at 31 Years.  Comments:  4/19/2016 7:02 AM Blanca Hughes  Laparoscopic left with mesh.  Appendectomy ([id number]) on 1/21/1986 at 25 Years.  Exploration of abdomen ([id number]) in the month of 2/1960 at 5 Months.  Comments:  4/19/2016 6:56 AM Blanca Hughes  Diverticulosis vs intussusception   Social History:        Alcohol Assessment            Current                     Comments:                      10/02/2012 - Lilly Graves LPN                     Rare      Tobacco Assessment            Never      Substance Abuse Assessment            Never      Employment and Education Assessment            Employed, Work/School description: [employer]      Home and Environment Assessment            Marital status:  (Living together).  Spouse/Partner name: Liana.  Lives with Children, Spouse.  4               children.  Living situation: Home/Independent.      Nutrition and Health Assessment            Type of diet: Regular.      Exercise and Physical Activity Assessment                     Comments:                      10/02/2012 Lilly Collier LPN                      Active @ work, outdoors.      Sexual Assessment            Sexual orientation: Heterosexual.        Physical Examination   Vital Signs   9/25/2019 4:06 PM CDT Temperature Tympanic 102.6 DegF  HI    Peripheral Pulse Rate 101 bpm  HI    HR Method Electronic    Systolic Blood Pressure 148 mmHg  HI    Diastolic Blood Pressure 80 mmHg    Mean Arterial Pressure 103 mmHg    BP Site Left arm    BP Method Manual    Oxygen Saturation 94 %      Measurements from flowsheet : Measurements   9/25/2019 4:06 PM CDT Height Measured - Standard 66 in    Weight Measured - Standard 190.4 lb    BSA 2 m2    Body Mass Index 30.73 kg/m2  HI      General:  Alert and oriented, No acute distress.    Eye:  Pupils are equal, round and reactive to light, Extraocular movements are intact, Normal conjunctiva.    HENT:  Normocephalic, Oral mucosa is moist, No pharyngeal erythema.    Neck:  Supple, Non-tender, No lymphadenopathy.    Respiratory:  Lungs are clear to auscultation, Respirations are non-labored, Breath sounds are equal.    Cardiovascular:  Normal rate, Regular rhythm, No murmur.    Psychiatric:  Cooperative, Appropriate mood & affect.       Review / Management   Results review:  Lab results   9/25/2019 4:37 PM CDT Influenza A POC Negative    Influenza B POC Negative   .       Impression and Plan   Diagnosis     Fever (HCV25-ZD R50.9).     Headache (IGY84-NA R51).     Pneumonia (WHR85-MF J18.9).     Patient Instructions:       Counseled: Patient, Regarding diagnosis, Regarding treatment, Regarding medications, Regarding activity, Verbalized understanding.    Orders     Orders (Selected)   Prescriptions  Prescribed  amoxicillin-clavulanate 875 mg-125 mg oral tablet: = 1 tab(s), Oral, q12 hrs, x 10 day(s), # 20 tab(s), 0 Refill(s), Type: Acute, Pharmacy: [facility], 1 tab(s) Oral q12 hrs,x10 day(s).     Take medicine as prescribed, side effects discussed.  Tylenol/ibuprofen for fever and discomfort.  Push fluids.  RTC  fall risk   2. Recommend depakote 125 mg sprinkles q8 hours prn for agitation. 3. Recommend scheduled depakote 125 mg po sprinkles at dinner time. Do not recommend sending patient home on this medication. 4. Psychiatry will follow but please call if patient too sedated or if agitation continues. 5. Thank you for this consult    PS to Dr Rai Damon regarding recommendations.   Electronically signed by KALI Walker CNP on 9/19/2022 at 11:20 AM if not improving in 36-48 hours, prior if concerns as we have discussed.

## 2022-09-23 ENCOUNTER — OFFICE VISIT (OUTPATIENT)
Dept: FAMILY MEDICINE | Facility: CLINIC | Age: 62
End: 2022-09-23
Payer: COMMERCIAL

## 2022-09-23 VITALS
SYSTOLIC BLOOD PRESSURE: 122 MMHG | BODY MASS INDEX: 32.38 KG/M2 | DIASTOLIC BLOOD PRESSURE: 74 MMHG | WEIGHT: 200.62 LBS | HEART RATE: 76 BPM

## 2022-09-23 DIAGNOSIS — S22.5XXD CLOSED FRACTURE OF MULTIPLE RIBS WITH FLAIL CHEST WITH ROUTINE HEALING, SUBSEQUENT ENCOUNTER: ICD-10-CM

## 2022-09-23 DIAGNOSIS — F51.01 PRIMARY INSOMNIA: ICD-10-CM

## 2022-09-23 DIAGNOSIS — S27.1XXD TRAUMATIC HEMOTHORAX, SUBSEQUENT ENCOUNTER: Primary | ICD-10-CM

## 2022-09-23 DIAGNOSIS — Z11.4 SCREENING FOR HIV (HUMAN IMMUNODEFICIENCY VIRUS): ICD-10-CM

## 2022-09-23 DIAGNOSIS — I10 PRIMARY HYPERTENSION: ICD-10-CM

## 2022-09-23 DIAGNOSIS — Z11.59 NEED FOR HEPATITIS C SCREENING TEST: ICD-10-CM

## 2022-09-23 DIAGNOSIS — E78.00 PURE HYPERCHOLESTEROLEMIA: ICD-10-CM

## 2022-09-23 DIAGNOSIS — K21.9 GASTROESOPHAGEAL REFLUX DISEASE WITHOUT ESOPHAGITIS: ICD-10-CM

## 2022-09-23 PROBLEM — S27.808A: Status: ACTIVE | Noted: 2022-09-13

## 2022-09-23 PROCEDURE — 99214 OFFICE O/P EST MOD 30 MIN: CPT | Performed by: FAMILY MEDICINE

## 2022-09-23 RX ORDER — PREGABALIN 75 MG/1
75 CAPSULE ORAL 2 TIMES DAILY
Qty: 60 CAPSULE | Refills: 1 | Status: SHIPPED | OUTPATIENT
Start: 2022-09-23 | End: 2022-10-25

## 2022-09-23 RX ORDER — TRAZODONE HYDROCHLORIDE 50 MG/1
50 TABLET, FILM COATED ORAL AT BEDTIME
Qty: 30 TABLET | Refills: 1 | Status: SHIPPED | OUTPATIENT
Start: 2022-09-23 | End: 2023-01-25

## 2022-09-23 RX ORDER — PREGABALIN 75 MG/1
75 CAPSULE ORAL 3 TIMES DAILY
Qty: 30 CAPSULE | Refills: 1 | Status: SHIPPED | OUTPATIENT
Start: 2022-09-23 | End: 2022-09-23

## 2022-09-23 RX ORDER — PREGABALIN 75 MG/1
75 CAPSULE ORAL DAILY
Qty: 30 CAPSULE | Refills: 1 | Status: SHIPPED | OUTPATIENT
Start: 2022-09-23 | End: 2022-09-23

## 2022-09-23 RX ORDER — ROSUVASTATIN CALCIUM 20 MG/1
20 TABLET, COATED ORAL DAILY
Qty: 90 TABLET | Refills: 3 | Status: SHIPPED | OUTPATIENT
Start: 2022-09-23 | End: 2023-10-31

## 2022-09-23 RX ORDER — OMEPRAZOLE 40 MG/1
40 CAPSULE, DELAYED RELEASE ORAL DAILY
Qty: 90 CAPSULE | Refills: 3 | Status: SHIPPED | OUTPATIENT
Start: 2022-09-23 | End: 2023-10-31

## 2022-09-23 RX ORDER — LOSARTAN POTASSIUM 100 MG/1
100 TABLET ORAL DAILY
Qty: 90 TABLET | Refills: 3 | Status: SHIPPED | OUTPATIENT
Start: 2022-09-23 | End: 2023-11-01

## 2022-09-23 RX ORDER — CYCLOBENZAPRINE HCL 5 MG
5 TABLET ORAL
COMMUNITY
Start: 2022-09-13 | End: 2022-09-23

## 2022-09-23 RX ORDER — PREGABALIN 75 MG/1
75 CAPSULE ORAL 3 TIMES DAILY
COMMUNITY
Start: 2022-09-13 | End: 2022-09-23

## 2022-09-23 ASSESSMENT — ASTHMA QUESTIONNAIRES
ACT_TOTALSCORE: 25
ACT_TOTALSCORE: 25
QUESTION_4 LAST FOUR WEEKS HOW OFTEN HAVE YOU USED YOUR RESCUE INHALER OR NEBULIZER MEDICATION (SUCH AS ALBUTEROL): NOT AT ALL
QUESTION_2 LAST FOUR WEEKS HOW OFTEN HAVE YOU HAD SHORTNESS OF BREATH: NOT AT ALL
QUESTION_1 LAST FOUR WEEKS HOW MUCH OF THE TIME DID YOUR ASTHMA KEEP YOU FROM GETTING AS MUCH DONE AT WORK, SCHOOL OR AT HOME: NONE OF THE TIME
QUESTION_3 LAST FOUR WEEKS HOW OFTEN DID YOUR ASTHMA SYMPTOMS (WHEEZING, COUGHING, SHORTNESS OF BREATH, CHEST TIGHTNESS OR PAIN) WAKE YOU UP AT NIGHT OR EARLIER THAN USUAL IN THE MORNING: NOT AT ALL
QUESTION_5 LAST FOUR WEEKS HOW WOULD YOU RATE YOUR ASTHMA CONTROL: COMPLETELY CONTROLLED

## 2022-09-23 NOTE — PROGRESS NOTES
"  Assessment & Plan     Screening for HIV (human immunodeficiency virus)  - HIV Antigen Antibody Combo; Future    Need for hepatitis C screening test  - Hepatitis C Screen Reflex to HCV RNA Quant and Genotype; Future    Traumatic hemothorax, subsequent encounter  Slowly improving  - pregabalin (LYRICA) 75 MG capsule; Take 1 capsule (75 mg) by mouth 3 times daily    Closed fracture of multiple ribs with flail chest with routine healing, subsequent encounter  Slowly improving  - pregabalin (LYRICA) 75 MG capsule; Take 1 capsule (75 mg) by mouth twice daily    Primary insomnia  Not improving  Stop cyclobenzaprine  - traZODone (DESYREL) 50 MG tablet; Take 1 tablet (50 mg) by mouth At Bedtime      BMI:   Estimated body mass index is 32.38 kg/m  as calculated from the following:    Height as of 11/4/21: 1.676 m (5' 6\").    Weight as of this encounter: 91 kg (200 lb 9.9 oz).       RTC 4 weeks    Aydin Yu MD  [facility]    Sumeet Morales is a 62 year old, presenting for the following health issues:  Hospital F/U (9-11-22 [facility])      History of Present Illness       Reason for visit:  Ribs recheck    He eats 2-3 servings of fruits and vegetables daily.He consumes 0 sweetened beverage(s) daily.He exercises with enough effort to increase his heart rate 10 to 19 minutes per day.  He exercises with enough effort to increase his heart rate 5 days per week.   He is taking medications regularly.         Hospital Follow-up Visit:    Hospital/Nursing Home/IP Rehab Facility: [facility]  Date of Admission: 9-11-22  Date of Discharge: 9-11-22  Reason(s) for Admission: Rib Fracture, diaphragm rupture    Was your hospitalization related to COVID-19? No   Problems taking medications regularly:  None  Medication changes since discharge: Added Flexeril and Lyrica, stopped gabapentin and robaxin  Problems adhering to non-medication therapy:  None    Summary of hospitalization:  " [facility] information obtained and reviewed  Diagnostic Tests/Treatments reviewed.  Follow up needed: reviewed notes  Other Healthcare Providers Involved in Patient s Care:         Surgical follow-up appointment - released  Update since discharge: stable.         Post Medication Reconciliation Status:        Plan of care communicated with patient             Concern - Rib Fracture  Onset: 6 weeks  Description: fall  Intensity: severe  Progression of Symptoms:  improving  Accompanying Signs & Symptoms: Insomnia  Previous history of similar problem: no  Precipitating factors:        Worsened by: Movement  Alleviating factors:        Improved by: Lyrica  Therapies tried and outcome: see above      Review of Systems   Constitutional, HEENT, cardiovascular, pulmonary, gi and gu systems are negative, except as otherwise noted.      Objective    /74 (BP Location: Right arm, Cuff Size: Adult Large)   Pulse 76   Wt 91 kg (200 lb 9.9 oz)   BMI 32.38 kg/m    Body mass index is 32.38 kg/m .  Physical Exam   GENERAL: healthy, alert and no distress  NECK: no adenopathy, no asymmetry, masses, or scars and thyroid normal to palpation  RESP: lungs clear to auscultation - no rales, rhonchi or wheezes  CV: regular rate and rhythm, normal S1 S2, no S3 or S4, no murmur, click or rub, no peripheral edema and peripheral pulses strong  ABDOMEN: soft, nontender, no hepatosplenomegaly, no masses and bowel sounds normal  MS: Left sided rib pain

## 2022-09-29 ENCOUNTER — TELEPHONE (OUTPATIENT)
Dept: FAMILY MEDICINE | Facility: CLINIC | Age: 62
End: 2022-09-29

## 2022-09-29 DIAGNOSIS — G47.33 OBSTRUCTIVE SLEEP APNEA SYNDROME: Primary | ICD-10-CM

## 2022-09-29 NOTE — TELEPHONE ENCOUNTER
Please call patient and let him know orders faxed.  If home supply needs more information patient needs to make an appointment with Dr. Reid.

## 2022-09-29 NOTE — PROGRESS NOTES
DME (Durable Medical Equipment) Orders and Documentation  No orders of the defined types were placed in this encounter.     The patient was assessed and it was determined the patient is in need of the following listed DME Supplies/Equipment. Please complete supporting documentation below to demonstrate medical necessity.      DME All Other Item(s) Documentation    List reason for need and supporting documentation for medical necessity below for each DME item.     1. YESENIA           Pam Alvarez was seen today because of discomfort in his right ear  His physical exam revealed cerumen impaction or wax pushed against his eardrum  He also has some wax in the left ear canal but it is not against his eardrum  His ears were flushed and his cerumen was removed  Examination after the wax was removed reveals some irritation to his right ear canal   The remainder of his exam is negative today  I recommend starting Cipro dex drops, 4 drops in the right ear canal twice daily for 7 days  After instilling the drops please insert a cotton ball wick to prevent the drops from extruding out of the ear  Please keep in touch if Pam Alvarez is not improving particularly if he develops any fever 101 or greater cough or cold, sore throat or worsening ear discomfort  Cerumen Impaction   WHAT YOU NEED TO KNOW:   What is cerumen impaction? Cerumen impaction is the blockage of the outer ear canal by tightly packed cerumen (earwax)  What causes cerumen impaction? Anything that affects the normal outward flow of cerumen may cause impaction  The following factors may make it more likely for earwax to become impacted:  · Advanced age     · Conditions that produce too much cerumen, such as keratosis and other skin diseases    · Narrow or abnormally shaped ear canals    · Use of a hearing aid    · Incorrect use of cotton swabs, or using needles, hair pins, or other objects to clean the ears  What are the signs and symptoms of cerumen impaction? · Trouble hearing    · Dizziness    · Ear fullness or a feeling that something is plugging up your ear    · Itchiness or pain in the ears    · Ringing in the ears  How is cerumen impaction diagnosed? Your healthcare provider will ask about your medical history  This includes any ear problems or procedures you may have had  Your healthcare provider will carefully check your ears using a scope with a light   Your healthcare provider may look for other problems, such as bleeding, infection, or injury  Your eardrums will be checked for tears or holes  Your healthcare provider may also test your hearing  How is cerumen impaction treated? The goal of treatment is to remove the hardened wax  The type of treatment to be used may depend on your age, symptoms, or risk factors  Ask your healthcare provider which of the following treatments may be best for you:  · Ear drops:  Ear drops may be used to clear or soften the impacted earwax  This may be used alone or in combination with a procedure to take out the earwax  · Procedures:  When the impaction can be clearly seen, removal may be done using any of the following:    ¨ Irrigation:  Warm water from a syringe is used to wash the wax out of the ear canal  Irrigation may not be used on people with an eardrum tear, infection, or who have had ear surgery  ¨ Suction:  A small plastic tube that is connected to a machine is used to suck the impacted wax out of your ear  ¨ Instruments:  Your healthcare provider may use a curette (scoop-like instrument) or forceps to remove the impacted wax  What are the risks of having a cerumen impaction? · Procedures to remove the wax may cause bleeding and infection  The ear canal may be scraped and scratched or the eardrum may be injured, which may cause loss of hearing  · Untreated impacted cerumen may cause your symptoms to become worse  If it is not removed, impacted cerumen may cause an infection, irritation, loss of hearing, or further ear problems  When should I contact my healthcare provider? · You have a fever  · You have trouble hearing or hear ringing noises  · You have questions or concerns about your condition or care  When should I seek immediate care? · You feel dizzy  · You have discharge or blood coming out of your ear  · Your ear pain does not go away or gets worse  CARE AGREEMENT:   You have the right to help plan your care   Learn about your health condition and how it may be treated  Discuss treatment options with your caregivers to decide what care you want to receive  You always have the right to refuse treatment  The above information is an  only  It is not intended as medical advice for individual conditions or treatments  Talk to your doctor, nurse or pharmacist before following any medical regimen to see if it is safe and effective for you  © 2017 [employer] Information is for End User's use only and may not be sold, redistributed or otherwise used for commercial purposes  All illustrations and images included in CareNotes® are the copyrighted property of A [employer] M [employer] Inc  or Trey Whitmore

## 2022-09-29 NOTE — TELEPHONE ENCOUNTER
Order/Referral Request    Who is requesting: Patient    Orders being requested: CPap supplies    Reason service is needed/diagnosis: sleep apnea    When are orders needed by: as soon as possible    Has this been discussed with Provider: Yes    Does patient have a preference on a Group/Provider/Facility? [facility] in [place]    Does patient have an appointment scheduled?: No    Where to send orders: Fax    Okay to leave a detailed message?: Yes at Home number on file [phone] ([place])

## 2022-10-03 ENCOUNTER — OFFICE VISIT (OUTPATIENT)
Dept: FAMILY MEDICINE | Facility: CLINIC | Age: 62
End: 2022-10-03
Payer: COMMERCIAL

## 2022-10-03 VITALS
BODY MASS INDEX: 32.54 KG/M2 | WEIGHT: 201.6 LBS | HEART RATE: 76 BPM | DIASTOLIC BLOOD PRESSURE: 86 MMHG | TEMPERATURE: 97.8 F | SYSTOLIC BLOOD PRESSURE: 137 MMHG

## 2022-10-03 DIAGNOSIS — Z11.4 SCREENING FOR HIV (HUMAN IMMUNODEFICIENCY VIRUS): ICD-10-CM

## 2022-10-03 DIAGNOSIS — Z11.59 NEED FOR HEPATITIS C SCREENING TEST: ICD-10-CM

## 2022-10-03 DIAGNOSIS — R19.00 ABDOMINAL SWELLING: Primary | ICD-10-CM

## 2022-10-03 DIAGNOSIS — Z87.828 HX OF RECENT TRAUMA: ICD-10-CM

## 2022-10-03 LAB
ALBUMIN SERPL BCG-MCNC: 4.8 G/DL (ref 3.5–5.2)
ALP SERPL-CCNC: 91 U/L (ref 40–129)
ALT SERPL W P-5'-P-CCNC: 29 U/L (ref 10–50)
ANION GAP SERPL CALCULATED.3IONS-SCNC: 11 MMOL/L (ref 7–15)
AST SERPL W P-5'-P-CCNC: 33 U/L (ref 10–50)
BASOPHILS # BLD AUTO: 0 10E3/UL (ref 0–0.2)
BASOPHILS NFR BLD AUTO: 1 %
BILIRUB SERPL-MCNC: 0.4 MG/DL
BUN SERPL-MCNC: 20.6 MG/DL (ref 8–23)
CALCIUM SERPL-MCNC: 10.4 MG/DL (ref 8.8–10.2)
CHLORIDE SERPL-SCNC: 102 MMOL/L (ref 98–107)
CREAT SERPL-MCNC: 0.94 MG/DL (ref 0.67–1.17)
DEPRECATED HCO3 PLAS-SCNC: 26 MMOL/L (ref 22–29)
EOSINOPHIL # BLD AUTO: 0 10E3/UL (ref 0–0.7)
EOSINOPHIL NFR BLD AUTO: 0 %
ERYTHROCYTE [DISTWIDTH] IN BLOOD BY AUTOMATED COUNT: 14.1 % (ref 10–15)
GFR SERPL CREATININE-BSD FRML MDRD: >90 ML/MIN/1.73M2
GLUCOSE SERPL-MCNC: 100 MG/DL (ref 70–99)
HCT VFR BLD AUTO: 38.5 % (ref 40–53)
HGB BLD-MCNC: 13 G/DL (ref 13.3–17.7)
IMM GRANULOCYTES # BLD: 0 10E3/UL
IMM GRANULOCYTES NFR BLD: 1 %
LYMPHOCYTES # BLD AUTO: 1.8 10E3/UL (ref 0.8–5.3)
LYMPHOCYTES NFR BLD AUTO: 30 %
MCH RBC QN AUTO: 28.4 PG (ref 26.5–33)
MCHC RBC AUTO-ENTMCNC: 33.8 G/DL (ref 31.5–36.5)
MCV RBC AUTO: 84 FL (ref 78–100)
MONOCYTES # BLD AUTO: 0.5 10E3/UL (ref 0–1.3)
MONOCYTES NFR BLD AUTO: 9 %
NEUTROPHILS # BLD AUTO: 3.6 10E3/UL (ref 1.6–8.3)
NEUTROPHILS NFR BLD AUTO: 60 %
PLATELET # BLD AUTO: 209 10E3/UL (ref 150–450)
POTASSIUM SERPL-SCNC: 4.4 MMOL/L (ref 3.4–5.3)
PROT SERPL-MCNC: 7.4 G/DL (ref 6.4–8.3)
RBC # BLD AUTO: 4.58 10E6/UL (ref 4.4–5.9)
SODIUM SERPL-SCNC: 139 MMOL/L (ref 136–145)
WBC # BLD AUTO: 6 10E3/UL (ref 4–11)

## 2022-10-03 PROCEDURE — 80053 COMPREHEN METABOLIC PANEL: CPT | Performed by: FAMILY MEDICINE

## 2022-10-03 PROCEDURE — 87389 HIV-1 AG W/HIV-1&-2 AB AG IA: CPT | Performed by: FAMILY MEDICINE

## 2022-10-03 PROCEDURE — 99214 OFFICE O/P EST MOD 30 MIN: CPT | Performed by: FAMILY MEDICINE

## 2022-10-03 PROCEDURE — 86803 HEPATITIS C AB TEST: CPT | Performed by: FAMILY MEDICINE

## 2022-10-03 PROCEDURE — 36415 COLL VENOUS BLD VENIPUNCTURE: CPT | Performed by: FAMILY MEDICINE

## 2022-10-03 PROCEDURE — 85025 COMPLETE CBC W/AUTO DIFF WBC: CPT | Mod: QW | Performed by: FAMILY MEDICINE

## 2022-10-03 NOTE — PROGRESS NOTES
Clinical Decision Making:    At the end of the encounter, I discussed results, diagnosis, medications. Discussed red flags for immediate return to clinic/ER, as well as indications for follow up if no improvement. Patient understood and agreed to plan. Patient was stable for discharge.      ICD-10-CM    1. Abdominal swelling  R19.00 CBC with platelets and differential     Comprehensive metabolic panel (BMP + Alb, Alk Phos, ALT, AST, Total. Bili, TP)     CBC with platelets and differential     Comprehensive metabolic panel (BMP + Alb, Alk Phos, ALT, AST, Total. Bili, TP)     CT Chest Abdomen Pelvis w/o & w Contrast     CANCELED: CT Chest Abdomen Pelvis w/o & w Contrast   2. Hx of recent trauma  Z87.828 CBC with platelets and differential     Comprehensive metabolic panel (BMP + Alb, Alk Phos, ALT, AST, Total. Bili, TP)     CBC with platelets and differential     Comprehensive metabolic panel (BMP + Alb, Alk Phos, ALT, AST, Total. Bili, TP)     CT Chest Abdomen Pelvis w/o & w Contrast     CANCELED: CT Chest Abdomen Pelvis w/o & w Contrast   3. Screening for HIV (human immunodeficiency virus)  Z11.4 HIV Antigen Antibody Combo   4. Need for hepatitis C screening test  Z11.59 Hepatitis C Screen Reflex to HCV RNA Quant and Genotype     Discussed lab and CT results with Andrew on the phone.  Recommended that he watch his symptoms closely and proceed to the emergency room if worsening.  Recommended that he follow-up with his primary later this week for recheck.  Patient verbalized understanding.        There are no Patient Instructions on file for this visit.   No follow-ups on file.      chief complaint    HPI:  Andrew HARRIET Monique Jr. is a 62 year old male who presents today complaining of abdominal bloating and swelling for 4 or 5 days.    2 months ago, July 28, 2022, he fell in his garage and broke 6 ribs.  He punctured his lung and abdomen and ended up having surgery at [place].  He has steel plates on the ribs 7 through  10.    He slowly has noted some abdominal swelling but it has gotten much worse the last for 5 days.  He feels that it is pushing the left side of his chest up and causing some pain in the chest area.  He does note maybe some mild shortness of breath.  No nausea or vomiting.  He has a good appetite and has been eating well.  He does occasionally have constipation but has had good bowel movements recently.  No blood in the stool.  No abdominal pain although it feels tight or tense.  No back pain.    History obtained from chart review and the patient.    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 No pertinent past medical history.    Social History     Tobacco Use     Smoking status: Never Smoker     Smokeless tobacco: Never Used   Substance Use Topics     Alcohol use: Not on file       Review of systems  negative except listed in HPI    Vitals:    10/03/22 1034   BP: 137/86   BP Location: Right arm   Patient Position: Sitting   Cuff Size: Adult Large   Pulse: 76   Temp: 97.8  F (36.6  C)   Weight: 91.4 kg (201 lb 9.6 oz)       Physical Exam  Vitals noted and within normal limits  In general he is alert, oriented, and in no acute distress  Heart has a regular rate and rhythm with no murmurs  Lungs are clear to auscultation bilaterally with good air entry  Abdomen with normal bowel sounds.  Soft and nontender.  He has ascites type swelling on the left greater than the right.  His umbilicus appears to be shifted to his right side.  There is no  erythema.  CBC within normal limits with anemia improving  CMP: Within normal limits with chronic mildly elevated calcium  CT chest/abdomen/pelvis: Impression  1.  Postoperative changes screw and plate fixation left seventh through 10th ribs.  Healing fractures of the posterior left 11th and 12th ribs noted.  2.  Small left effusion with small amount of atelectasis in the left lung base.  3.  Remainder of the examination negative  Results for orders placed or performed in visit on 10/03/22   Comprehensive metabolic panel (BMP + Alb, Alk Phos, ALT, AST, Total. Bili, TP)     Status: Abnormal   Result Value Ref Range    Sodium 139 136 - 145 mmol/L    Potassium 4.4 3.4 - 5.3 mmol/L    Chloride 102 98 - 107 mmol/L    Carbon Dioxide (CO2) 26 22 - 29 mmol/L    Anion Gap 11 7 - 15 mmol/L    Urea Nitrogen 20.6 8.0 - 23.0 mg/dL    Creatinine 0.94 0.67 - 1.17 mg/dL    Calcium 10.4 (H) 8.8 - 10.2 mg/dL    Glucose 100 (H) 70 - 99 mg/dL    Alkaline Phosphatase 91 40 - 129 U/L    AST 33 10 - 50 U/L    ALT 29 10 - 50 U/L    Protein Total 7.4 6.4 - 8.3 g/dL    Albumin 4.8 3.5 - 5.2 g/dL    Bilirubin Total 0.4 <=1.2 mg/dL    GFR Estimate >90 >60 mL/min/1.73m2   CBC with platelets and differential     Status: Abnormal   Result Value Ref Range    WBC Count 6.0 4.0 - 11.0 10e3/uL    RBC Count 4.58 4.40 - 5.90 10e6/uL    Hemoglobin 13.0 (L) 13.3 - 17.7 g/dL    Hematocrit 38.5 (L) 40.0 - 53.0 %    MCV 84 78 - 100 fL    MCH 28.4 26.5 - 33.0 pg    MCHC 33.8 31.5 - 36.5 g/dL    RDW 14.1 10.0 - 15.0 %    Platelet Count 209 150 - 450 10e3/uL    % Neutrophils 60 %    % Lymphocytes 30 %    % Monocytes 9 %    % Eosinophils 0 %    % Basophils 1 %    % Immature Granulocytes 1 %    Absolute Neutrophils 3.6 1.6 - 8.3 10e3/uL    Absolute Lymphocytes 1.8 0.8 - 5.3 10e3/uL    Absolute Monocytes 0.5 0.0 - 1.3 10e3/uL    Absolute Eosinophils 0.0 0.0 - 0.7 10e3/uL    Absolute Basophils 0.0 0.0 - 0.2 10e3/uL    Absolute Immature Granulocytes 0.0 <=0.4  10e3/uL   CBC with platelets and differential     Status: Abnormal    Narrative    The following orders were created for panel order CBC with platelets and differential.  Procedure                               Abnormality         Status                     ---------                               -----------         ------                     CBC with platelets and d...[[id number]]  Abnormal            Final result                 Please view results for these tests on the individual orders.               Answers for HPI/ROS submitted by the patient on 9/23/2022  What is the reason for your visit today? : Ribs recheck  How many servings of fruits and vegetables do you eat daily?: 2-3  On average, how many sweetened beverages do you drink each day (Examples: soda, juice, sweet tea, etc.  Do NOT count diet or artificially sweetened beverages)?: 0  How many minutes a day do you exercise enough to make your heart beat faster?: 10 to 19  How many days a week do you exercise enough to make your heart beat faster?: 5  How many days per week do you miss taking your medication?: 0

## 2022-10-03 NOTE — LETTER
October 4, 2022      Andrew Monique Jr.   [address] [facility] [address]        Dear ,    We are writing to inform you of your test results.    Your test results fall within the expected range(s) or remain unchanged from previous results.  Please continue with current treatment plan.    Resulted Orders   HIV Antigen Antibody Combo   Result Value Ref Range    HIV Antigen Antibody Combo Nonreactive Nonreactive      Comment:      HIV-1 p24 Ag & HIV-1/HIV-2 Ab Not Detected   Hepatitis C Screen Reflex to HCV RNA Quant and Genotype   Result Value Ref Range    Hepatitis C Antibody Nonreactive Nonreactive    Narrative    Assay performance characteristics have not been established for newborns, infants, and children.       If you have any questions or concerns, please call the clinic at the number listed above.       Sincerely,      Aydin Yu MD

## 2022-10-04 LAB
HCV AB SERPL QL IA: NONREACTIVE
HIV 1+2 AB+HIV1 P24 AG SERPL QL IA: NONREACTIVE

## 2022-10-11 ENCOUNTER — OFFICE VISIT (OUTPATIENT)
Dept: FAMILY MEDICINE | Facility: CLINIC | Age: 62
End: 2022-10-11
Payer: COMMERCIAL

## 2022-10-11 VITALS
SYSTOLIC BLOOD PRESSURE: 135 MMHG | HEART RATE: 82 BPM | DIASTOLIC BLOOD PRESSURE: 85 MMHG | BODY MASS INDEX: 32.12 KG/M2 | TEMPERATURE: 98.3 F | WEIGHT: 199 LBS | OXYGEN SATURATION: 97 % | RESPIRATION RATE: 16 BRPM

## 2022-10-11 DIAGNOSIS — Z91.81 PERSONAL HISTORY OF FALL: ICD-10-CM

## 2022-10-11 DIAGNOSIS — R14.0 ABDOMINAL BLOATING: ICD-10-CM

## 2022-10-11 DIAGNOSIS — R19.00 ABDOMINAL SWELLING: Primary | ICD-10-CM

## 2022-10-11 DIAGNOSIS — S27.1XXD TRAUMATIC HEMOTHORAX, SUBSEQUENT ENCOUNTER: ICD-10-CM

## 2022-10-11 PROCEDURE — 99214 OFFICE O/P EST MOD 30 MIN: CPT | Performed by: FAMILY MEDICINE

## 2022-10-11 ASSESSMENT — ASTHMA QUESTIONNAIRES: ACT_TOTALSCORE: 24

## 2022-10-11 NOTE — PROGRESS NOTES
Assessment & Plan     Abdominal swelling    Traumatic hemothorax, subsequent encounter    Personal history of fall    Abdominal bloating    Reviewed all X-rays and Medical records from last visits and hospital        30 minutes spent on the date of the encounter doing chart review, history and exam, documentation and further activities per the note           Return in about 4 weeks (around 11/8/2022).    Aydin Yu MD  [facility]    Sumeet Morales is a 62 year old, presenting for the following health issues:  Follow Up (Abdominal bloating ongoing)      History of Present Illness       Reason for visit:  Recheck ribs    He eats 2-3 servings of fruits and vegetables daily.He consumes 0 sweetened beverage(s) daily.He exercises with enough effort to increase his heart rate 30 to 60 minutes per day.  He exercises with enough effort to increase his heart rate 7 days per week.   He is taking medications regularly.       Asthma Follow-Up    Was ACT completed today?  C/o occasional SOB due to punctured lung.  Yes    ACT Total Scores 10/11/2022   ACT TOTAL SCORE (Goal Greater than or Equal to 20) 24   In the past 12 months, how many times did you visit the emergency room for your asthma without being admitted to the hospital? 0   In the past 12 months, how many times were you hospitalized overnight because of your asthma? 0          How many days per week do you miss taking your asthma controller medication?  0    Please describe any recent triggers for your asthma: None    Have you had any Emergency Room Visits, Urgent Care Visits, or Hospital Admissions since your last office visit?  No        Review of Systems   Constitutional, HEENT, cardiovascular, pulmonary, gu systems are negative, except as otherwise noted.      Objective    /85   Pulse 82   Temp 98.3  F (36.8  C) (Tympanic)   Resp 16   Wt 90.3 kg (199 lb)   SpO2 97%   BMI 32.12 kg/m    Body mass index is 32.12  kg/m .  Physical Exam   GENERAL: healthy, alert and no distress  NECK: no adenopathy, no asymmetry, masses, or scars and thyroid normal to palpation  RESP: lungs clear to auscultation - no rales, rhonchi or wheezes  CV: regular rate and rhythm, normal S1 S2, no S3 or S4, no murmur, click or rub, no peripheral edema and peripheral pulses strong  ABDOMEN: soft, nontender, no hepatosplenomegaly, no masses and bowel sounds normal  MS: no gross musculoskeletal defects noted, no edema

## 2022-10-25 ENCOUNTER — OFFICE VISIT (OUTPATIENT)
Dept: FAMILY MEDICINE | Facility: CLINIC | Age: 62
End: 2022-10-25
Payer: COMMERCIAL

## 2022-10-25 VITALS
SYSTOLIC BLOOD PRESSURE: 116 MMHG | HEART RATE: 78 BPM | WEIGHT: 200 LBS | RESPIRATION RATE: 16 BRPM | TEMPERATURE: 97.5 F | BODY MASS INDEX: 32.28 KG/M2 | DIASTOLIC BLOOD PRESSURE: 80 MMHG | OXYGEN SATURATION: 98 %

## 2022-10-25 DIAGNOSIS — L30.9 ECZEMA, UNSPECIFIED TYPE: ICD-10-CM

## 2022-10-25 DIAGNOSIS — S27.1XXD TRAUMATIC HEMOTHORAX, SUBSEQUENT ENCOUNTER: Primary | ICD-10-CM

## 2022-10-25 DIAGNOSIS — R14.0 ABDOMINAL BLOATING: ICD-10-CM

## 2022-10-25 PROCEDURE — 99214 OFFICE O/P EST MOD 30 MIN: CPT | Performed by: FAMILY MEDICINE

## 2022-10-25 RX ORDER — BETAMETHASONE DIPROPIONATE 0.5 MG/G
CREAM TOPICAL 2 TIMES DAILY
Qty: 45 G | Refills: 0 | Status: SHIPPED | OUTPATIENT
Start: 2022-10-25 | End: 2022-11-14

## 2022-10-25 ASSESSMENT — ASTHMA QUESTIONNAIRES
QUESTION_2 LAST FOUR WEEKS HOW OFTEN HAVE YOU HAD SHORTNESS OF BREATH: NOT AT ALL
QUESTION_4 LAST FOUR WEEKS HOW OFTEN HAVE YOU USED YOUR RESCUE INHALER OR NEBULIZER MEDICATION (SUCH AS ALBUTEROL): NOT AT ALL
QUESTION_5 LAST FOUR WEEKS HOW WOULD YOU RATE YOUR ASTHMA CONTROL: COMPLETELY CONTROLLED
QUESTION_1 LAST FOUR WEEKS HOW MUCH OF THE TIME DID YOUR ASTHMA KEEP YOU FROM GETTING AS MUCH DONE AT WORK, SCHOOL OR AT HOME: NONE OF THE TIME
QUESTION_3 LAST FOUR WEEKS HOW OFTEN DID YOUR ASTHMA SYMPTOMS (WHEEZING, COUGHING, SHORTNESS OF BREATH, CHEST TIGHTNESS OR PAIN) WAKE YOU UP AT NIGHT OR EARLIER THAN USUAL IN THE MORNING: NOT AT ALL
ACT_TOTALSCORE: 25
ACT_TOTALSCORE: 25

## 2022-10-25 ASSESSMENT — ENCOUNTER SYMPTOMS: ABDOMINAL DISTENTION: 1

## 2022-10-25 NOTE — PROGRESS NOTES
Assessment & Plan     Traumatic hemothorax, subsequent encounter    - Physical Therapy Referral; Future  - Orthopedic  Referral; Future  Will send back to [facility]    Abdominal bloating  Related to Trauma    Eczema, unspecified type  Not improving  - betamethasone dipropionate (DIPROSONE) 0.05 % external cream; Apply topically 2 times daily    F/U After visit with [facility].    30 minutes spent on the date of the encounter doing chart review, history and exam, documentation and further activities per the note      Aydin Yu MD  [facility]    Sumeet Morales is a 62 year old, presenting for the following health issues:  Follow Up (Hospital and medication follow up. Discuss Pregabalin and ongoing abdominal bloating)              History of Present Illness       Back Pain:  He presents for follow up of back pain. Patient's back pain is a recurring problem.  Location of back pain:  Left middle of back  Description of back pain: other  Back pain spreads: nowhere    Since patient first noticed back pain, pain is: always present, but gets better and worse  Does back pain interfere with his job:  Not applicable      Reason for visit:  Ribs recheck    He eats 2-3 servings of fruits and vegetables daily.He consumes 0 sweetened beverage(s) daily.He exercises with enough effort to increase his heart rate 20 to 29 minutes per day.    He is taking medications regularly.     Back pain and abdominal bloating worse since CT 4 weeks ago.  No fever, cough, or other symptoms.    Has not been back to work.    Hurts underneath scar area.  Has been 12 weeks now.      Rash  Onset/Duration: Long time  Description  Location: Heels and palms  Character: raised, flakey  Itching: no  Intensity:  moderate  Progression of Symptoms:  worsening  Accompanying signs and symptoms:   Fever: No  Body aches or joint pain: No  Sore throat symptoms: No  Recent cold symptoms: No  History:           Previous  episodes of similar rash: Waxes and wanes  New exposures:  None  Recent travel: No  Exposure to similar rash: No  Precipitating or alleviating factors:   Therapies tried and outcome: Triamcinolone no relief        Review of Systems   Gastrointestinal: Positive for abdominal distention.      Constitutional, HEENT, cardiovascular, pulmonary, gi and gu systems are negative, except as otherwise noted.      Objective    /80 (BP Location: Right arm, Patient Position: Sitting)   Pulse 78   Temp 97.5  F (36.4  C) (Tympanic)   Resp 16   Wt 90.7 kg (200 lb)   SpO2 98%   BMI 32.28 kg/m    Body mass index is 32.28 kg/m .  Physical Exam   GENERAL: healthy, alert and no distress  NECK: no adenopathy, no asymmetry, masses, or scars and thyroid normal to palpation  RESP: lungs clear to auscultation - no rales, rhonchi or wheezes  CV: regular rate and rhythm, normal S1 S2, no S3 or S4, no murmur, click or rub, no peripheral edema and peripheral pulses strong  ABDOMEN: soft, nontender, no hepatosplenomegaly, no masses and bowel sounds normal  MS: tender chest wall and under scar on left posterior chest.    SKIN: Rash c/w eczema right palm and both heels    Reviewed CT results

## 2022-11-14 DIAGNOSIS — L30.9 ECZEMA, UNSPECIFIED TYPE: ICD-10-CM

## 2022-11-14 RX ORDER — BETAMETHASONE DIPROPIONATE 0.5 MG/G
CREAM TOPICAL 2 TIMES DAILY
Qty: 45 G | Refills: 0 | Status: SHIPPED | OUTPATIENT
Start: 2022-11-14 | End: 2023-01-25

## 2022-11-14 NOTE — TELEPHONE ENCOUNTER
Routing to provider as refill request for review/approval because:  Drug not on the [facility] refill protocol   Wendy Hair RN  [facility]

## 2022-11-14 NOTE — TELEPHONE ENCOUNTER
Patient's dry cracked heels have not quite healed up, and patient would like a refill to continue to administer the cream.

## 2023-01-25 ENCOUNTER — ANCILLARY PROCEDURE (OUTPATIENT)
Dept: GENERAL RADIOLOGY | Facility: CLINIC | Age: 63
End: 2023-01-25
Attending: FAMILY MEDICINE
Payer: COMMERCIAL

## 2023-01-25 ENCOUNTER — OFFICE VISIT (OUTPATIENT)
Dept: FAMILY MEDICINE | Facility: CLINIC | Age: 63
End: 2023-01-25
Payer: COMMERCIAL

## 2023-01-25 VITALS
WEIGHT: 203 LBS | BODY MASS INDEX: 32.62 KG/M2 | OXYGEN SATURATION: 96 % | RESPIRATION RATE: 20 BRPM | HEART RATE: 82 BPM | DIASTOLIC BLOOD PRESSURE: 80 MMHG | HEIGHT: 66 IN | TEMPERATURE: 98.2 F | SYSTOLIC BLOOD PRESSURE: 132 MMHG

## 2023-01-25 DIAGNOSIS — S27.1XXD TRAUMATIC HEMOTHORAX, SUBSEQUENT ENCOUNTER: ICD-10-CM

## 2023-01-25 DIAGNOSIS — L30.9 ECZEMA OF HAND: ICD-10-CM

## 2023-01-25 DIAGNOSIS — S27.1XXD TRAUMATIC HEMOTHORAX, SUBSEQUENT ENCOUNTER: Primary | ICD-10-CM

## 2023-01-25 PROCEDURE — 99214 OFFICE O/P EST MOD 30 MIN: CPT | Performed by: FAMILY MEDICINE

## 2023-01-25 PROCEDURE — 71046 X-RAY EXAM CHEST 2 VIEWS: CPT | Mod: TC | Performed by: RADIOLOGY

## 2023-01-25 RX ORDER — FLUOCINONIDE 0.5 MG/G
OINTMENT TOPICAL 2 TIMES DAILY
Qty: 30 G | Refills: 1 | Status: SHIPPED | OUTPATIENT
Start: 2023-01-25 | End: 2023-12-19

## 2023-01-25 RX ORDER — GABAPENTIN 100 MG/1
CAPSULE ORAL
Qty: 42 CAPSULE | Refills: 0 | Status: SHIPPED | OUTPATIENT
Start: 2023-01-25 | End: 2023-02-13

## 2023-01-25 ASSESSMENT — PAIN SCALES - GENERAL: PAINLEVEL: MILD PAIN (2)

## 2023-01-25 ASSESSMENT — ENCOUNTER SYMPTOMS
ABDOMINAL PAIN: 1
ABDOMINAL DISTENTION: 1

## 2023-01-25 NOTE — PROGRESS NOTES
"  Assessment & Plan     Traumatic hemothorax, subsequent encounter    - XR Chest 2 Views; Future  - Thoracic Surgery  Referral; Future    Eczema to Hand    Lidex.      Return in about 4 weeks (around 2/22/2023).    Aydin Yu MD  [facility]    Sumeet Morales is a 62 year old, presenting for the following health issues:  Follow Up (Worsening abdominal bloating, left side rib pain and traumatic hemothorax. )      History of Present Illness       Reason for visit:  Swelling  of rib area  Symptom onset:  More than a month  Symptom intensity:  Mild  Symptom progression:  Worsening    He eats 2-3 servings of fruits and vegetables daily.He consumes 0 sweetened beverage(s) daily.He exercises with enough effort to increase his heart rate 60 or more minutes per day.  He exercises with enough effort to increase his heart rate 4 days per week.   He is taking medications regularly.             Review of Systems   Gastrointestinal: Positive for abdominal distention and abdominal pain.      Constitutional, HEENT, cardiovascular, pulmonary, gi and gu systems are negative, except as otherwise noted.      Objective    /80   Pulse 82   Temp 98.2  F (36.8  C) (Tympanic)   Resp 20   Ht 1.676 m (5' 6\")   Wt 92.1 kg (203 lb)   SpO2 96%   BMI 32.77 kg/m    Body mass index is 32.77 kg/m .  Physical Exam   GENERAL: healthy, alert and no distress  NECK: no adenopathy, no asymmetry, masses, or scars and thyroid normal to palpation  RESP: lungs clear to auscultation - no rales, rhonchi or wheezes  CV: regular rate and rhythm, normal S1 S2, no S3 or S4, no murmur, click or rub, no peripheral edema and peripheral pulses strong  ABDOMEN: Tender over left lateral Chest and upper abdomen  MS: no gross musculoskeletal defects noted, no edema    CXR - Reviewed and interpreted by me Normal- no infiltrates, effusions, pneumothoraces, cardiomegaly or masses.  Postoperative changes screw and " plate fixation left 7th through 10th ribs. Healing fractures of posterior left 11th rib, medial left 12th rib.

## 2023-02-12 ENCOUNTER — HEALTH MAINTENANCE LETTER (OUTPATIENT)
Age: 63
End: 2023-02-12

## 2023-02-13 DIAGNOSIS — S27.1XXD TRAUMATIC HEMOTHORAX, SUBSEQUENT ENCOUNTER: ICD-10-CM

## 2023-02-13 RX ORDER — GABAPENTIN 100 MG/1
100 CAPSULE ORAL 3 TIMES DAILY
Qty: 90 CAPSULE | Refills: 2 | Status: SHIPPED | OUTPATIENT
Start: 2023-02-13 | End: 2023-12-19

## 2023-02-13 NOTE — TELEPHONE ENCOUNTER
Gabapentin was ordered for 7 days on 1/25/23.    PATIENT is calling to see if he should continue on Medication or if he should discontinue medication.    Patient reports medication does help with discomfort.    Patient is taking last dose tonight. Will not have enough medication for morning dose if he is to continue medication.    Patient aware provider not in clinic today and will address tomorrow.     Pharmacy: [facility], [facility].

## 2023-03-02 ENCOUNTER — OFFICE VISIT (OUTPATIENT)
Dept: FAMILY MEDICINE | Facility: CLINIC | Age: 63
End: 2023-03-02
Payer: COMMERCIAL

## 2023-03-02 VITALS
BODY MASS INDEX: 33.23 KG/M2 | HEART RATE: 76 BPM | WEIGHT: 205.9 LBS | DIASTOLIC BLOOD PRESSURE: 76 MMHG | OXYGEN SATURATION: 98 % | RESPIRATION RATE: 14 BRPM | SYSTOLIC BLOOD PRESSURE: 138 MMHG

## 2023-03-02 DIAGNOSIS — S27.1XXD TRAUMATIC HEMOTHORAX, SUBSEQUENT ENCOUNTER: ICD-10-CM

## 2023-03-02 DIAGNOSIS — L30.9 ECZEMA, UNSPECIFIED TYPE: Primary | ICD-10-CM

## 2023-03-02 PROCEDURE — 99214 OFFICE O/P EST MOD 30 MIN: CPT | Performed by: FAMILY MEDICINE

## 2023-03-02 RX ORDER — CLOBETASOL PROPIONATE 0.5 MG/G
OINTMENT TOPICAL 2 TIMES DAILY
Qty: 30 G | Refills: 0 | Status: SHIPPED | OUTPATIENT
Start: 2023-03-02 | End: 2023-12-19

## 2023-03-02 ASSESSMENT — ENCOUNTER SYMPTOMS
CONSTITUTIONAL NEGATIVE: 1
MUSCULOSKELETAL NEGATIVE: 1
RESPIRATORY NEGATIVE: 1
NEUROLOGICAL NEGATIVE: 1
CARDIOVASCULAR NEGATIVE: 1
ENDOCRINE NEGATIVE: 1
EYES NEGATIVE: 1
GASTROINTESTINAL NEGATIVE: 1
HEMATOLOGIC/LYMPHATIC NEGATIVE: 1
ALLERGIC/IMMUNOLOGIC NEGATIVE: 1
PSYCHIATRIC NEGATIVE: 1

## 2023-03-02 NOTE — PROGRESS NOTES
Assessment & Plan     Eczema, unspecified type  Not responsive to Lidex      Adult Dermatology Referral; Future  - clobetasol (TEMOVATE) 0.05 % external ointment; Apply topically 2 times daily    Traumatic hemothorax, subsequent encounter  Gradually improving  Reassurance, continue gabapentin    Return After dermatology appointment.    MD MAXINE [facility]    Sumeet Morales is a 62 year old accompanied by his self, presenting for the following health issues:  Derm Problem (Dry/Cracking Sking - Bilateral Feet and Hands.) and Swelling (Abdominal - Left Quad)      History of Present Illness       Reason for visit:  Hand and foot rash    He eats 2-3 servings of fruits and vegetables daily.He consumes 0 sweetened beverage(s) daily.He exercises with enough effort to increase his heart rate 9 or less minutes per day.  He exercises with enough effort to increase his heart rate 3 or less days per week.   He is taking medications regularly.       Skin Lesion  Onset/Duration: 2 months  Description  Location: hands and feet  Color: White  Border description: irregular border  Character: blotchy  Itching: moderate  Bleeding:  No  Intensity:  moderate  Progression of Symptoms:  worsening  Accompanying signs and symptoms:   Bleeding: YES  Scaling: YES  Excessive sun exposure/tanning: No  Sunscreen used: N/A  History:           Any previous history of skin cancer: No  Any family history of melanoma: No  Previous episodes of similar lesion: No  Precipitating or alleviating factors: Unknown?  Therapies tried and outcome: hydrocortisone cream -  not effective    F/U Hemothorax    Fell in garage.  Was hospitalized for 7 days.  Gradually improving.  Gabapentin helping though possible cause of rash.  Will get derm opinion.        Review of Systems   Constitutional: Negative.    HENT: Negative.    Eyes: Negative.    Respiratory: Negative.    Cardiovascular: Negative.    Gastrointestinal:  Negative.    Endocrine: Negative.    Genitourinary: Negative.    Musculoskeletal: Negative.    Skin: Negative.    Allergic/Immunologic: Negative.    Neurological: Negative.    Hematological: Negative.    Psychiatric/Behavioral: Negative.          Objective    /76   Pulse 76   Resp 14   Wt 93.4 kg (205 lb 14.4 oz)   SpO2 98%   BMI 33.23 kg/m    Body mass index is 33.23 kg/m .  Physical Exam   GENERAL: healthy, alert and no distress  NECK: no adenopathy, no asymmetry, masses, or scars and thyroid normal to palpation  RESP: lungs clear to auscultation - no rales, rhonchi or wheezes  CV: regular rate and rhythm, normal S1 S2, no S3 or S4, no murmur, click or rub, no peripheral edema and peripheral pulses strong  ABDOMEN: soft, nontender, no hepatosplenomegaly, no masses and bowel sounds normal  SKIN: See rash pictures

## 2023-10-31 DIAGNOSIS — K21.9 GASTROESOPHAGEAL REFLUX DISEASE WITHOUT ESOPHAGITIS: ICD-10-CM

## 2023-10-31 DIAGNOSIS — I10 PRIMARY HYPERTENSION: ICD-10-CM

## 2023-10-31 DIAGNOSIS — E78.00 PURE HYPERCHOLESTEROLEMIA: ICD-10-CM

## 2023-10-31 RX ORDER — OMEPRAZOLE 40 MG/1
40 CAPSULE, DELAYED RELEASE ORAL DAILY
Qty: 30 CAPSULE | Refills: 2 | Status: SHIPPED | OUTPATIENT
Start: 2023-10-31 | End: 2023-12-19

## 2023-10-31 RX ORDER — ROSUVASTATIN CALCIUM 20 MG/1
20 TABLET, COATED ORAL DAILY
Qty: 30 TABLET | Refills: 0 | Status: SHIPPED | OUTPATIENT
Start: 2023-10-31 | End: 2023-12-01

## 2023-11-01 RX ORDER — LOSARTAN POTASSIUM 100 MG/1
100 TABLET ORAL DAILY
Qty: 30 TABLET | Refills: 0 | Status: SHIPPED | OUTPATIENT
Start: 2023-11-01 | End: 2023-12-19

## 2023-11-30 DIAGNOSIS — E78.00 PURE HYPERCHOLESTEROLEMIA: ICD-10-CM

## 2023-12-01 RX ORDER — ROSUVASTATIN CALCIUM 20 MG/1
20 TABLET, COATED ORAL DAILY
Qty: 30 TABLET | Refills: 0 | Status: SHIPPED | OUTPATIENT
Start: 2023-12-01 | End: 2023-12-19

## 2023-12-12 ASSESSMENT — ASTHMA QUESTIONNAIRES: ACT_TOTALSCORE: 22

## 2023-12-19 ENCOUNTER — OFFICE VISIT (OUTPATIENT)
Dept: FAMILY MEDICINE | Facility: CLINIC | Age: 63
End: 2023-12-19
Payer: COMMERCIAL

## 2023-12-19 VITALS
RESPIRATION RATE: 16 BRPM | BODY MASS INDEX: 33.11 KG/M2 | WEIGHT: 206 LBS | SYSTOLIC BLOOD PRESSURE: 134 MMHG | TEMPERATURE: 97.8 F | OXYGEN SATURATION: 96 % | DIASTOLIC BLOOD PRESSURE: 84 MMHG | HEART RATE: 67 BPM | HEIGHT: 66 IN

## 2023-12-19 DIAGNOSIS — G47.33 OBSTRUCTIVE SLEEP APNEA SYNDROME: ICD-10-CM

## 2023-12-19 DIAGNOSIS — Z12.5 SCREENING FOR PROSTATE CANCER: ICD-10-CM

## 2023-12-19 DIAGNOSIS — Z13.29 THYROID DISORDER SCREENING: ICD-10-CM

## 2023-12-19 DIAGNOSIS — I10 PRIMARY HYPERTENSION: ICD-10-CM

## 2023-12-19 DIAGNOSIS — L30.9 ECZEMA OF HAND: Primary | ICD-10-CM

## 2023-12-19 DIAGNOSIS — E78.00 PURE HYPERCHOLESTEROLEMIA: ICD-10-CM

## 2023-12-19 DIAGNOSIS — K21.9 GASTROESOPHAGEAL REFLUX DISEASE WITHOUT ESOPHAGITIS: ICD-10-CM

## 2023-12-19 LAB
ANION GAP SERPL CALCULATED.3IONS-SCNC: 12 MMOL/L (ref 7–15)
BUN SERPL-MCNC: 17.3 MG/DL (ref 8–23)
CALCIUM SERPL-MCNC: 10.3 MG/DL (ref 8.8–10.2)
CHLORIDE SERPL-SCNC: 100 MMOL/L (ref 98–107)
CHOLEST SERPL-MCNC: 201 MG/DL
CREAT SERPL-MCNC: 1 MG/DL (ref 0.67–1.17)
DEPRECATED HCO3 PLAS-SCNC: 26 MMOL/L (ref 22–29)
EGFRCR SERPLBLD CKD-EPI 2021: 84 ML/MIN/1.73M2
ERYTHROCYTE [DISTWIDTH] IN BLOOD BY AUTOMATED COUNT: 12.9 % (ref 10–15)
FASTING STATUS PATIENT QL REPORTED: ABNORMAL
GLUCOSE SERPL-MCNC: 103 MG/DL (ref 70–99)
HCT VFR BLD AUTO: 43.7 % (ref 40–53)
HDLC SERPL-MCNC: 38 MG/DL
HGB BLD-MCNC: 14.7 G/DL (ref 13.3–17.7)
LDLC SERPL CALC-MCNC: ABNORMAL MG/DL
LDLC SERPL DIRECT ASSAY-MCNC: 100 MG/DL
MCH RBC QN AUTO: 28.5 PG (ref 26.5–33)
MCHC RBC AUTO-ENTMCNC: 33.6 G/DL (ref 31.5–36.5)
MCV RBC AUTO: 85 FL (ref 78–100)
NONHDLC SERPL-MCNC: 163 MG/DL
PLATELET # BLD AUTO: 211 10E3/UL (ref 150–450)
POTASSIUM SERPL-SCNC: 4.4 MMOL/L (ref 3.4–5.3)
PSA SERPL DL<=0.01 NG/ML-MCNC: 2.9 NG/ML (ref 0–4.5)
RBC # BLD AUTO: 5.16 10E6/UL (ref 4.4–5.9)
SODIUM SERPL-SCNC: 138 MMOL/L (ref 135–145)
TRIGL SERPL-MCNC: 407 MG/DL
TSH SERPL DL<=0.005 MIU/L-ACNC: 3.14 UIU/ML (ref 0.3–4.2)
WBC # BLD AUTO: 7 10E3/UL (ref 4–11)

## 2023-12-19 PROCEDURE — 36415 COLL VENOUS BLD VENIPUNCTURE: CPT | Performed by: FAMILY MEDICINE

## 2023-12-19 PROCEDURE — 84443 ASSAY THYROID STIM HORMONE: CPT | Performed by: FAMILY MEDICINE

## 2023-12-19 PROCEDURE — G0103 PSA SCREENING: HCPCS | Performed by: FAMILY MEDICINE

## 2023-12-19 PROCEDURE — 80048 BASIC METABOLIC PNL TOTAL CA: CPT | Performed by: FAMILY MEDICINE

## 2023-12-19 PROCEDURE — 80061 LIPID PANEL: CPT | Performed by: FAMILY MEDICINE

## 2023-12-19 PROCEDURE — 85027 COMPLETE CBC AUTOMATED: CPT | Performed by: FAMILY MEDICINE

## 2023-12-19 PROCEDURE — 99215 OFFICE O/P EST HI 40 MIN: CPT | Performed by: FAMILY MEDICINE

## 2023-12-19 PROCEDURE — 83721 ASSAY OF BLOOD LIPOPROTEIN: CPT | Mod: 59 | Performed by: FAMILY MEDICINE

## 2023-12-19 RX ORDER — ROSUVASTATIN CALCIUM 20 MG/1
20 TABLET, COATED ORAL DAILY
Qty: 90 TABLET | Refills: 3 | Status: SHIPPED | OUTPATIENT
Start: 2023-12-19 | End: 2023-12-20

## 2023-12-19 RX ORDER — TRIAMCINOLONE ACETONIDE 1 MG/G
CREAM TOPICAL 2 TIMES DAILY
Qty: 453 G | Refills: 1 | Status: SHIPPED | OUTPATIENT
Start: 2023-12-19 | End: 2024-03-12

## 2023-12-19 RX ORDER — OMEPRAZOLE 40 MG/1
40 CAPSULE, DELAYED RELEASE ORAL DAILY
Qty: 90 CAPSULE | Refills: 3 | Status: SHIPPED | OUTPATIENT
Start: 2023-12-19

## 2023-12-19 RX ORDER — TRIAMCINOLONE ACETONIDE 1 MG/G
CREAM TOPICAL 2 TIMES DAILY
COMMUNITY
Start: 2023-11-24 | End: 2023-12-19

## 2023-12-19 RX ORDER — LOSARTAN POTASSIUM 100 MG/1
100 TABLET ORAL DAILY
Qty: 90 TABLET | Refills: 3 | Status: SHIPPED | OUTPATIENT
Start: 2023-12-19

## 2023-12-19 NOTE — PROGRESS NOTES
"  Assessment & Plan     Primary hypertension  Stable, Controlled by prescription medication prescribed by me and up to date.  - losartan (COZAAR) 100 MG tablet; Take 1 tablet (100 mg) by mouth daily    Gastroesophageal reflux disease without esophagitis  Stable, Controlled by prescription medication prescribed by me and up to date.  Doing well  - omeprazole (PRILOSEC) 40 MG DR capsule; Take 1 capsule (40 mg) by mouth daily    Pure hypercholesterolemia  Stable, Controlled by prescription medication prescribed by me and up to date.  - rosuvastatin (CRESTOR) 20 MG tablet; Take 1 tablet (20 mg) by mouth daily    Eczema of hand  Stable, reviewed derm notes, unable to afford dupixent  - triamcinolone (KENALOG) 0.1 % external cream; Apply topically 2 times daily ONLY to hands and feet    YESENIA will get new mask    40 minutes spent by me on the date of the encounter doing chart review, history and exam, documentation and further activities per the note         BMI:   Estimated body mass index is 33.25 kg/m  as calculated from the following:    Height as of this encounter: 1.676 m (5' 6\").    Weight as of this encounter: 93.4 kg (206 lb).           Aydin Yu MD  [facility]    Sumeet Morales is a 63 year old, presenting for the following health issues:  discuss labs (Is fasting.), Derm Problem (Rash on hands bilateral. Cream works some but does not resolve. ), left ribs (Still bothersome from fall a year ago. ), and cpap supply concern (Issues with seal of mask. Uses [employer] for supplies.)        12/19/2023     8:03 AM   Additional Questions   Roomed by LA       History of Present Illness       Reason for visit:  Hand rash  and ribs, possibly  labs    He eats 2-3 servings of fruits and vegetables daily.He consumes 2 sweetened beverage(s) daily.He exercises with enough effort to increase his heart rate 10 to 19 minutes per day.  He exercises with enough effort to increase his " "heart rate 4 days per week.   He is taking medications regularly.         Hyperlipidemia Follow-Up    Are you regularly taking any medication or supplement to lower your cholesterol?   Yes- statin  Are you having muscle aches or other side effects that you think could be caused by your cholesterol lowering medication?  No    Hypertension Follow-up    Do you check your blood pressure regularly outside of the clinic? Yes   Are you following a low salt diet? Yes  Are your blood pressures ever more than 140 on the top number (systolic) OR more   than 90 on the bottom number (diastolic), for example 140/90? No    GERD controlled on omeprazole    Eczema- Triamcinolone seems to work best.    YESENIA needs new mask    Review of Systems   Constitutional, HEENT, cardiovascular, pulmonary, gi and gu systems are negative, except as otherwise noted.      Objective    /84 (BP Location: Right arm, Patient Position: Sitting, Cuff Size: Adult Large)   Pulse 67   Temp 97.8  F (36.6  C) (Tympanic)   Resp 16   Ht 1.676 m (5' 6\")   Wt 93.4 kg (206 lb)   SpO2 96%   BMI 33.25 kg/m    Body mass index is 33.25 kg/m .  Physical Exam   GENERAL: healthy, alert and no distress  NECK: no adenopathy, no asymmetry, masses, or scars and thyroid normal to palpation  RESP: lungs clear to auscultation - no rales, rhonchi or wheezes  CV: regular rate and rhythm, normal S1 S2, no S3 or S4, no murmur, click or rub, no peripheral edema and peripheral pulses strong  ABDOMEN: soft, nontender, no hepatosplenomegaly, no masses and bowel sounds normal  MS: no gross musculoskeletal defects noted, no edema                    DME (Durable Medical Equipment) Orders and Documentation  No orders of the defined types were placed in this encounter.  CPAP Supplies order       The patient was assessed and it was determined the patient is in need of the following listed DME Supplies/Equipment. Please complete supporting documentation below to demonstrate medical " necessity.

## 2023-12-20 RX ORDER — ROSUVASTATIN CALCIUM 40 MG/1
40 TABLET, COATED ORAL DAILY
Qty: 90 TABLET | Refills: 3 | Status: SHIPPED | OUTPATIENT
Start: 2023-12-20

## 2023-12-21 ENCOUNTER — TELEPHONE (OUTPATIENT)
Dept: FAMILY MEDICINE | Facility: CLINIC | Age: 63
End: 2023-12-21
Payer: COMMERCIAL

## 2023-12-21 NOTE — TELEPHONE ENCOUNTER
Patient called stating his CPAP company has not contacted him yet. Order submitted on 12/19/23. Please assist per Dr Yu

## 2023-12-21 NOTE — TELEPHONE ENCOUNTER
General Call    Contacts         Type Contact Phone/Fax    12/21/2023 01:31 PM CST Phone (Incoming) Andrew Monique Jr. (Self) [phone] (M)          Reason for Call:  CPAP DME order    What are your questions or concerns:  pt would like to make sure order gets faxed to the [facility] vs. The actual clinic.    Fax #: [phone]    CPAP order faxed

## 2024-03-10 ENCOUNTER — HEALTH MAINTENANCE LETTER (OUTPATIENT)
Age: 64
End: 2024-03-10

## 2024-03-12 ENCOUNTER — OFFICE VISIT (OUTPATIENT)
Dept: FAMILY MEDICINE | Facility: CLINIC | Age: 64
End: 2024-03-12
Payer: COMMERCIAL

## 2024-03-12 VITALS
TEMPERATURE: 97.4 F | HEIGHT: 66 IN | HEART RATE: 70 BPM | DIASTOLIC BLOOD PRESSURE: 80 MMHG | BODY MASS INDEX: 33.27 KG/M2 | WEIGHT: 207 LBS | OXYGEN SATURATION: 97 % | RESPIRATION RATE: 16 BRPM | SYSTOLIC BLOOD PRESSURE: 135 MMHG

## 2024-03-12 DIAGNOSIS — E78.00 PURE HYPERCHOLESTEROLEMIA: ICD-10-CM

## 2024-03-12 DIAGNOSIS — Z13.1 SCREENING FOR DIABETES MELLITUS: ICD-10-CM

## 2024-03-12 DIAGNOSIS — Z00.00 WELL ADULT EXAM: ICD-10-CM

## 2024-03-12 DIAGNOSIS — R73.01 ELEVATED FASTING BLOOD SUGAR: ICD-10-CM

## 2024-03-12 DIAGNOSIS — D22.9 NUMEROUS MOLES: ICD-10-CM

## 2024-03-12 DIAGNOSIS — R73.03 PREDIABETES: ICD-10-CM

## 2024-03-12 DIAGNOSIS — Z13.29 THYROID DISORDER SCREENING: ICD-10-CM

## 2024-03-12 DIAGNOSIS — Z12.5 SCREENING FOR PROSTATE CANCER: ICD-10-CM

## 2024-03-12 DIAGNOSIS — M54.6 ACUTE BILATERAL THORACIC BACK PAIN: Primary | ICD-10-CM

## 2024-03-12 DIAGNOSIS — L30.9 ECZEMA OF HAND: ICD-10-CM

## 2024-03-12 LAB
ANION GAP SERPL CALCULATED.3IONS-SCNC: 10 MMOL/L (ref 7–15)
BUN SERPL-MCNC: 15.2 MG/DL (ref 8–23)
CALCIUM SERPL-MCNC: 9.9 MG/DL (ref 8.8–10.2)
CHLORIDE SERPL-SCNC: 102 MMOL/L (ref 98–107)
CHOLEST SERPL-MCNC: 163 MG/DL
CREAT SERPL-MCNC: 1.12 MG/DL (ref 0.67–1.17)
DEPRECATED HCO3 PLAS-SCNC: 26 MMOL/L (ref 22–29)
EGFRCR SERPLBLD CKD-EPI 2021: 73 ML/MIN/1.73M2
ERYTHROCYTE [DISTWIDTH] IN BLOOD BY AUTOMATED COUNT: 13.1 % (ref 10–15)
FASTING STATUS PATIENT QL REPORTED: ABNORMAL
GLUCOSE SERPL-MCNC: 109 MG/DL (ref 70–99)
HCT VFR BLD AUTO: 42.4 % (ref 40–53)
HDLC SERPL-MCNC: 32 MG/DL
HGB BLD-MCNC: 14.4 G/DL (ref 13.3–17.7)
LDLC SERPL CALC-MCNC: ABNORMAL MG/DL
LDLC SERPL DIRECT ASSAY-MCNC: 62 MG/DL
MCH RBC QN AUTO: 28.7 PG (ref 26.5–33)
MCHC RBC AUTO-ENTMCNC: 34 G/DL (ref 31.5–36.5)
MCV RBC AUTO: 85 FL (ref 78–100)
NONHDLC SERPL-MCNC: 131 MG/DL
PLATELET # BLD AUTO: 215 10E3/UL (ref 150–450)
POTASSIUM SERPL-SCNC: 4 MMOL/L (ref 3.4–5.3)
PSA SERPL DL<=0.01 NG/ML-MCNC: 2.83 NG/ML (ref 0–4.5)
RBC # BLD AUTO: 5.02 10E6/UL (ref 4.4–5.9)
SODIUM SERPL-SCNC: 138 MMOL/L (ref 135–145)
TRIGL SERPL-MCNC: 411 MG/DL
TSH SERPL DL<=0.005 MIU/L-ACNC: 1.82 UIU/ML (ref 0.3–4.2)
WBC # BLD AUTO: 6.6 10E3/UL (ref 4–11)

## 2024-03-12 PROCEDURE — 85027 COMPLETE CBC AUTOMATED: CPT | Performed by: FAMILY MEDICINE

## 2024-03-12 PROCEDURE — 83036 HEMOGLOBIN GLYCOSYLATED A1C: CPT | Performed by: FAMILY MEDICINE

## 2024-03-12 PROCEDURE — 36415 COLL VENOUS BLD VENIPUNCTURE: CPT | Performed by: FAMILY MEDICINE

## 2024-03-12 PROCEDURE — G0103 PSA SCREENING: HCPCS | Performed by: FAMILY MEDICINE

## 2024-03-12 PROCEDURE — 83721 ASSAY OF BLOOD LIPOPROTEIN: CPT | Mod: 59 | Performed by: FAMILY MEDICINE

## 2024-03-12 PROCEDURE — 99396 PREV VISIT EST AGE 40-64: CPT | Performed by: FAMILY MEDICINE

## 2024-03-12 PROCEDURE — 84443 ASSAY THYROID STIM HORMONE: CPT | Performed by: FAMILY MEDICINE

## 2024-03-12 PROCEDURE — 80048 BASIC METABOLIC PNL TOTAL CA: CPT | Performed by: FAMILY MEDICINE

## 2024-03-12 PROCEDURE — 80061 LIPID PANEL: CPT | Performed by: FAMILY MEDICINE

## 2024-03-12 PROCEDURE — 99213 OFFICE O/P EST LOW 20 MIN: CPT | Mod: 25 | Performed by: FAMILY MEDICINE

## 2024-03-12 RX ORDER — TRIAMCINOLONE ACETONIDE 1 MG/G
CREAM TOPICAL 2 TIMES DAILY
Qty: 453 G | Refills: 3 | Status: SHIPPED | OUTPATIENT
Start: 2024-03-12

## 2024-03-12 ASSESSMENT — ENCOUNTER SYMPTOMS: BACK PAIN: 1

## 2024-03-12 NOTE — PROGRESS NOTES
"  Assessment & Plan     Acute bilateral thoracic back pain  new  - Physical Therapy  Referral; Future    Eczema of hand  Discussed wrappin at night to increase potency of steroid  - triamcinolone (KENALOG) 0.1 % external cream; Apply topically 2 times daily ONLY to hands and feet    Well adult exam      Pure hypercholesterolemia  Stable, Controlled by prescription medication prescribed by me and up to date.  - Lipid panel reflex to direct LDL Fasting; Future    Screening for diabetes mellitus  - CBC with platelets; Future  - Basic metabolic panel; Future    Screening for prostate cancer  - Prostate Specific Antigen Screen; Future    Thyroid disorder screening  - TSH; Future            BMI  Estimated body mass index is 33.41 kg/m  as calculated from the following:    Height as of this encounter: 1.676 m (5' 6\").    Weight as of this encounter: 93.9 kg (207 lb).   Weight management plan: Discussed healthy diet and exercise guidelines          Sumeet Morales is a 63 year old, presenting for the following health issues:  Recheck Medication (Discuss recent lab results.) and Back Pain (Radiates from left side rib cage around to right side shoulder . Ongoing. )      3/12/2024     7:18 AM   Additional Questions   Roomed by srud   Accompanied by n/a     Back Pain   This is a recurrent problem. The current episode started more than 1 week ago. The problem occurs daily. The problem has been gradually worsening. The pain is associated with a recent injury. The pain is at a severity of 6/10. The pain is moderate. The symptoms are aggravated by certain positions (Sitting). The pain is The same all the time.   History of Present Illness       Reason for visit:  Following up    He eats 0-1 servings of fruits and vegetables daily.He consumes 2 sweetened beverage(s) daily.He exercises with enough effort to increase his heart rate 10 to 19 minutes per day.  He exercises with enough effort to increase his heart rate 7 days " "per week.   He is taking medications regularly.                     Objective    BP (!) 148/78 (BP Location: Right arm, Patient Position: Sitting)   Pulse 70   Temp 97.4  F (36.3  C) (Tympanic)   Resp 16   Ht 1.676 m (5' 6\")   Wt 93.9 kg (207 lb)   SpO2 97%   BMI 33.41 kg/m    Body mass index is 33.41 kg/m .  Physical Exam   GENERAL: alert and no distress  NECK: no adenopathy, no asymmetry, masses, or scars  RESP: lungs clear to auscultation - no rales, rhonchi or wheezes  CV: regular rate and rhythm, normal S1 S2, no S3 or S4, no murmur, click or rub, no peripheral edema  ABDOMEN: soft, nontender, no hepatosplenomegaly, no masses and bowel sounds normal  MS: upper back pain            Signed Electronically by: Aydin Yu MD    "

## 2024-03-13 PROBLEM — R73.03 PREDIABETES: Status: ACTIVE | Noted: 2024-03-13

## 2024-03-13 LAB — HBA1C MFR BLD: 5.7 % (ref 0–5.6)

## 2024-04-16 NOTE — TELEPHONE ENCOUNTER
---------------------  From: Carolyn/Bernice GARCIA (Phone Messages Pool ([id number]_[facility]))   To: T Message Pool ([id number]_[facility]);     Sent: 10/13/2021 8:46:05 AM CDT  Subject: General Message     Phone Message    PCP: [facility]    Time of Call: 0844    Phone Number: [phone]    Returned call at: n/a    Note: Call from pt wondering what his lab results are. Pt notified of result letter from 10/11/21. Patient wondering when he is to f/u w/ [facility]. I do not see any RTC updates and nothing in last visit note as to when to f/u.    Please advise. OK to wait until tomorrow---------------------  From: Lexii Matos CMA (T Message Pool ([id number]_[facility]))   To: Aydin Yu MD;     Sent: 10/13/2021 11:31:51 AM CDT  Subject: FW: General Message  ** Submitted: **  Order:Return to Clinic (Request)  Details:  Return in 6 months         Signed by Aydin Yu MD  10/13/2021 4:45:00 PM [facility]---------------------  From: Aydin Yu MD   To: T Message Pool ([id number]_[facility]);     Sent: 10/13/2021 11:46:10 AM CDT  Subject: RE: General Message     Please call and tell him next march is fine unless he has urgent concerns.Patient notified w/ understanding.   Detail Level: Detailed Add 95930 Cpt? (Important Note: In 2017 The Use Of 54357 Is Being Tracked By Cms To Determine Future Global Period Reimbursement For Global Periods): no

## 2024-08-29 ENCOUNTER — OFFICE VISIT (OUTPATIENT)
Dept: FAMILY MEDICINE | Facility: CLINIC | Age: 64
End: 2024-08-29
Payer: COMMERCIAL

## 2024-08-29 VITALS
HEART RATE: 75 BPM | BODY MASS INDEX: 32.99 KG/M2 | HEIGHT: 66 IN | RESPIRATION RATE: 16 BRPM | WEIGHT: 205.3 LBS | TEMPERATURE: 97 F | SYSTOLIC BLOOD PRESSURE: 118 MMHG | OXYGEN SATURATION: 96 % | DIASTOLIC BLOOD PRESSURE: 80 MMHG

## 2024-08-29 DIAGNOSIS — D22.30 CHANGE IN FACIAL MOLE: Primary | ICD-10-CM

## 2024-08-29 PROCEDURE — 88312 SPECIAL STAINS GROUP 1: CPT | Performed by: PATHOLOGY

## 2024-08-29 PROCEDURE — 11102 TANGNTL BX SKIN SINGLE LES: CPT | Performed by: FAMILY MEDICINE

## 2024-08-29 PROCEDURE — 88305 TISSUE EXAM BY PATHOLOGIST: CPT | Performed by: PATHOLOGY

## 2024-08-29 NOTE — PROGRESS NOTES
"  Assessment & Plan     Change in facial mole  - Surgical Pathology Exam  F/U per path report            Subjective   Andrew is a 64 year old, presenting for the following health issues:  Derm Problem (Skin tag near eye that needs to be removed.)        8/29/2024    10:10 AM   Additional Questions   Roomed by Lucia     History of Present Illness       Reason for visit:  Skin tag buy eye   He is taking medications regularly.               Objective    /80 (BP Location: Right arm, Patient Position: Sitting, Cuff Size: Adult Large)   Pulse 75   Temp 97  F (36.1  C) (Tympanic)   Resp 16   Ht 1.676 m (5' 6\")   Wt 93.1 kg (205 lb 4.8 oz)   SpO2 96%   BMI 33.14 kg/m    Body mass index is 33.14 kg/m .  Physical Exam   Mole to bridge of nose on right    SKIN: Skin Biopsy    Date/Time: 8/29/2024 10:42 AM    Performed by: Aydin Yu MD  Authorized by: Aydin Yu MD    Procedure Details - Skin Biopsy:     Body area: head/neck    Head/neck location: nose    Initial size (mm): 2    Final defect size (mm): 2    Malignancy: malignancy unknown      Destruction method: shave biopsy      Comments:   Wound cauterized with drysol.  Band aid dressing.            Signed Electronically by: Aydin Yu MD    "

## 2024-09-05 LAB
PATH REPORT.COMMENTS IMP SPEC: NORMAL
PATH REPORT.FINAL DX SPEC: NORMAL
PATH REPORT.GROSS SPEC: NORMAL
PATH REPORT.MICROSCOPIC SPEC OTHER STN: NORMAL
PATH REPORT.RELEVANT HX SPEC: NORMAL
PHOTO IMAGE: NORMAL

## 2024-10-30 DIAGNOSIS — E78.00 PURE HYPERCHOLESTEROLEMIA: ICD-10-CM

## 2024-10-30 RX ORDER — ROSUVASTATIN CALCIUM 40 MG/1
40 TABLET, COATED ORAL DAILY
Qty: 90 TABLET | Refills: 2 | Status: SHIPPED | OUTPATIENT
Start: 2024-10-30

## 2024-12-10 DIAGNOSIS — L30.9 ECZEMA OF HAND: ICD-10-CM

## 2024-12-11 RX ORDER — TRIAMCINOLONE ACETONIDE 1 MG/G
CREAM TOPICAL
Qty: 160 G | Refills: 0 | Status: SHIPPED | OUTPATIENT
Start: 2024-12-11

## 2025-01-16 ENCOUNTER — TELEPHONE (OUTPATIENT)
Dept: FAMILY MEDICINE | Facility: CLINIC | Age: 65
End: 2025-01-16
Payer: COMMERCIAL

## 2025-01-16 DIAGNOSIS — G47.33 OBSTRUCTIVE SLEEP APNEA SYNDROME: Primary | ICD-10-CM

## 2025-01-16 DIAGNOSIS — Z53.9 ERRONEOUS ENCOUNTER--DISREGARD: Primary | ICD-10-CM

## 2025-01-16 NOTE — ADDENDUM NOTE
Addended by: MELANIE CASTRO on: 1/16/2025 02:19 PM     Modules accepted: Orders, Level of Service

## 2025-01-16 NOTE — TELEPHONE ENCOUNTER
Patient is requesting his CPAP supplies for the new year. He is requesting an order for this. Same equipment as last year.    [facility], in [place], [place] at the Hospital. Please fax order there.

## 2025-05-04 SDOH — HEALTH STABILITY: PHYSICAL HEALTH: ON AVERAGE, HOW MANY DAYS PER WEEK DO YOU ENGAGE IN MODERATE TO STRENUOUS EXERCISE (LIKE A BRISK WALK)?: 4 DAYS

## 2025-05-04 SDOH — HEALTH STABILITY: PHYSICAL HEALTH: ON AVERAGE, HOW MANY MINUTES DO YOU ENGAGE IN EXERCISE AT THIS LEVEL?: 100 MIN

## 2025-05-04 ASSESSMENT — SOCIAL DETERMINANTS OF HEALTH (SDOH): HOW OFTEN DO YOU GET TOGETHER WITH FRIENDS OR RELATIVES?: MORE THAN THREE TIMES A WEEK

## 2025-05-08 ENCOUNTER — OFFICE VISIT (OUTPATIENT)
Dept: FAMILY MEDICINE | Facility: CLINIC | Age: 65
End: 2025-05-08
Payer: COMMERCIAL

## 2025-05-08 VITALS
RESPIRATION RATE: 16 BRPM | OXYGEN SATURATION: 98 % | BODY MASS INDEX: 30.89 KG/M2 | HEART RATE: 69 BPM | TEMPERATURE: 97 F | SYSTOLIC BLOOD PRESSURE: 135 MMHG | HEIGHT: 67 IN | DIASTOLIC BLOOD PRESSURE: 75 MMHG | WEIGHT: 196.8 LBS

## 2025-05-08 DIAGNOSIS — G47.33 OBSTRUCTIVE SLEEP APNEA SYNDROME: ICD-10-CM

## 2025-05-08 DIAGNOSIS — E78.49 FAMILIAL COMBINED HYPERLIPIDEMIA: ICD-10-CM

## 2025-05-08 DIAGNOSIS — I10 PRIMARY HYPERTENSION: ICD-10-CM

## 2025-05-08 DIAGNOSIS — L30.9 ECZEMA OF HAND: ICD-10-CM

## 2025-05-08 DIAGNOSIS — Z00.00 MEDICARE ANNUAL WELLNESS VISIT, SUBSEQUENT: ICD-10-CM

## 2025-05-08 DIAGNOSIS — E78.00 PURE HYPERCHOLESTEROLEMIA: ICD-10-CM

## 2025-05-08 DIAGNOSIS — Z13.6 SCREENING FOR CARDIOVASCULAR CONDITION: Primary | ICD-10-CM

## 2025-05-08 DIAGNOSIS — K21.9 GASTROESOPHAGEAL REFLUX DISEASE WITHOUT ESOPHAGITIS: ICD-10-CM

## 2025-05-08 LAB
ANION GAP SERPL CALCULATED.3IONS-SCNC: 10 MMOL/L (ref 7–15)
BUN SERPL-MCNC: 18.8 MG/DL (ref 8–23)
CALCIUM SERPL-MCNC: 10.4 MG/DL (ref 8.8–10.4)
CHLORIDE SERPL-SCNC: 104 MMOL/L (ref 98–107)
CHOLEST SERPL-MCNC: 187 MG/DL
CREAT SERPL-MCNC: 0.99 MG/DL (ref 0.67–1.17)
EGFRCR SERPLBLD CKD-EPI 2021: 85 ML/MIN/1.73M2
FASTING STATUS PATIENT QL REPORTED: YES
FASTING STATUS PATIENT QL REPORTED: YES
GLUCOSE SERPL-MCNC: 113 MG/DL (ref 70–99)
HCO3 SERPL-SCNC: 25 MMOL/L (ref 22–29)
HDLC SERPL-MCNC: 36 MG/DL
LDLC SERPL CALC-MCNC: ABNORMAL MG/DL
LDLC SERPL DIRECT ASSAY-MCNC: 86 MG/DL
NONHDLC SERPL-MCNC: 151 MG/DL
POTASSIUM SERPL-SCNC: 4.7 MMOL/L (ref 3.4–5.3)
SODIUM SERPL-SCNC: 139 MMOL/L (ref 135–145)
TRIGL SERPL-MCNC: 437 MG/DL

## 2025-05-08 PROCEDURE — 80048 BASIC METABOLIC PNL TOTAL CA: CPT | Performed by: FAMILY MEDICINE

## 2025-05-08 PROCEDURE — 83721 ASSAY OF BLOOD LIPOPROTEIN: CPT | Performed by: FAMILY MEDICINE

## 2025-05-08 PROCEDURE — 80061 LIPID PANEL: CPT | Performed by: FAMILY MEDICINE

## 2025-05-08 RX ORDER — TRIAMCINOLONE ACETONIDE 1 MG/G
CREAM TOPICAL 3 TIMES DAILY
Qty: 160 G | Refills: 5 | Status: SHIPPED | OUTPATIENT
Start: 2025-05-08

## 2025-05-08 RX ORDER — ROSUVASTATIN CALCIUM 40 MG/1
40 TABLET, COATED ORAL DAILY
Qty: 90 TABLET | Refills: 3 | Status: SHIPPED | OUTPATIENT
Start: 2025-05-08

## 2025-05-08 RX ORDER — BRIMONIDINE TARTRATE 2 MG/ML
1 SOLUTION/ DROPS OPHTHALMIC
COMMUNITY
Start: 2025-01-08

## 2025-05-08 RX ORDER — LOSARTAN POTASSIUM 100 MG/1
100 TABLET ORAL DAILY
Qty: 90 TABLET | Refills: 3 | Status: SHIPPED | OUTPATIENT
Start: 2025-05-08

## 2025-05-08 RX ORDER — OMEPRAZOLE 40 MG/1
40 CAPSULE, DELAYED RELEASE ORAL DAILY
Qty: 90 CAPSULE | Refills: 3 | Status: SHIPPED | OUTPATIENT
Start: 2025-05-08

## 2025-05-08 NOTE — PROGRESS NOTES
"Preventive Care Visit  [facility]  Aydin Yu MD, Family Medicine  May 8, 2025      Assessment & Plan     Screening for cardiovascular condition    Familial combined hyperlipidemia  - Lipid panel reflex to direct LDL Fasting; Future    Primary hypertension  Stable, Controlled by prescription medication prescribed by me and up to date.  - BASIC METABOLIC PANEL; Future  - losartan (COZAAR) 100 MG tablet; Take 1 tablet (100 mg) by mouth daily.    Gastroesophageal reflux disease without esophagitis  Stable, Controlled by prescription medication prescribed by me and up to date.  - omeprazole (PRILOSEC) 40 MG DR capsule; Take 1 capsule (40 mg) by mouth daily.    Pure hypercholesterolemia  Stable, Controlled by prescription medication prescribed by me and up to date.  - rosuvastatin (CRESTOR) 40 MG tablet; Take 1 tablet (40 mg) by mouth daily.    Eczema of hand  Stable, Partially Controlled by prescription medication prescribed by me and up to date.  - triamcinolone (KENALOG) 0.1 % external cream; Apply topically 3 times daily.    Medicare annual wellness visit, subsequent    Obstructive sleep apnea syndrome  Stable, Controlled by prescription medication prescribed by me and up to date.  - CPAP Order for DME - ONLY FOR DME    Patient has been advised of split billing requirements and indicates understanding: Yes    The longitudinal plan of care for the diagnosis(es)/condition(s) as documented were addressed during this visit. Due to the added complexity in care, I will continue to support Andrew in the subsequent management and with ongoing continuity of care.      BMI  Estimated body mass index is 30.82 kg/m  as calculated from the following:    Height as of this encounter: 1.702 m (5' 7\").    Weight as of this encounter: 89.3 kg (196 lb 12.8 oz).       Counseling  Appropriate preventive services were addressed with this patient via screening, questionnaire, or discussion as appropriate " for fall prevention, nutrition, physical activity, Tobacco-use cessation, social engagement, weight loss and cognition.  Checklist reviewing preventive services available has been given to the patient.  Reviewed patient's diet, addressing concerns and/or questions.   The patient was instructed to see the dentist every 6 months.         Sumeet Morales is a 64 year old, presenting for the following:  Medicare Visit (AWV)        5/8/2025     7:57 AM   Additional Questions   Roomed by Lucia LLAMAS       Hyperlipidemia Follow-Up    Are you regularly taking any medication or supplement to lower your cholesterol?   Yes-    Are you having muscle aches or other side effects that you think could be caused by your cholesterol lowering medication?  No    Hypertension Follow-up    Do you check your blood pressure regularly outside of the clinic? Yes   Are you following a low salt diet? Yes  Are your blood pressures ever more than 140 on the top number (systolic) OR more   than 90 on the bottom number (diastolic), for example 140/90? No    BP Readings from Last 2 Encounters:   05/08/25 (!) 145/87   08/29/24 118/80     Sleep apnea  Doing well with CPAP      Advance Care Planning    Discussed advance care planning with patient; however, patient declined at this time.        5/4/2025   General Health   How would you rate your overall physical health? Excellent   Feel stress (tense, anxious, or unable to sleep) Not at all         5/4/2025   Nutrition   Diet: Regular (no restrictions)         5/4/2025   Exercise   Days per week of moderate/strenous exercise 4 days   Average minutes spent exercising at this level 100 min         5/4/2025   Social Factors   Frequency of gathering with friends or relatives More than three times a week   Worry food won't last until get money to buy more No   Food not last or not have enough money for food? No   Do you have housing? (Housing is defined as stable permanent housing and does not  include staying outside in a car, in a tent, in an abandoned building, in an overnight shelter, or couch-surfing.) Yes   Are you worried about losing your housing? No   Lack of transportation? No   Unable to get utilities (heat,electricity)? No         5/4/2025   Fall Risk   Fallen 2 or more times in the past year? No   Trouble with walking or balance? No          5/4/2025   Activities of Daily Living- Home Safety   Needs help with the following daily activites None of the above   Safety concerns in the home None of the above         5/4/2025   Dental   Dentist two times every year? (!) NO         5/4/2025   Hearing Screening   Hearing concerns? None of the above         5/4/2025   Driving Risk Screening   Patient/family members have concerns about driving No         5/4/2025   General Alertness/Fatigue Screening   Have you been more tired than usual lately? No         5/4/2025   Urinary Incontinence Screening   Bothered by leaking urine in past 6 months No         Today's PHQ-2 Score:       5/8/2025     7:48 AM   PHQ-2 ( 1999 [employer])   Q1: Little interest or pleasure in doing things 0   Q2: Feeling down, depressed or hopeless 0   PHQ-2 Score 0    Q1: Little interest or pleasure in doing things Not at all   Q2: Feeling down, depressed or hopeless Not at all   PHQ-2 Score 0       Patient-reported           5/4/2025   Substance Use   Alcohol more than 3/day or more than 7/wk Not Applicable   Do you have a current opioid prescription? No   How severe/bad is pain from 1 to 10? 0/10 (No Pain)   Do you use any other substances recreationally? No     Social History     Tobacco Use    Smoking status: Never     Passive exposure: Never    Smokeless tobacco: Never   Vaping Use    Vaping status: Never Used           5/4/2025   AAA Screening   Family history of Abdominal Aortic Aneurysm (AAA)? Unsure   Last PSA:   Prostate Specific Antigen Screen   Date Value Ref Range Status   03/12/2024 2.83 0.00 - 4.50 ng/mL Final     ASCVD  "Risk   The 10-year ASCVD risk score (Kb ESTRADA, et al., 2019) is: 20.2%    Values used to calculate the score:      Age: 64 years      Sex: Male      Is Non- : No      Diabetic: No      Tobacco smoker: No      Systolic Blood Pressure: 145 mmHg      Is BP treated: Yes      HDL Cholesterol: 32 mg/dL      Total Cholesterol: 163 mg/dL            Reviewed and updated as needed this visit by Provider                      Current providers sharing in care for this patient include:  Patient Care Team:  Aydin Yu MD as PCP - General (Family Medicine)  Anshu Florence MD as PCP - ENT (ENT-Otolaryngology)  Dasha Degroot as PCP - Audiology (Audiology)  Aydin Yu MD as Assigned PCP    The following health maintenance items are reviewed in Epic and correct as of today:  Health Maintenance   Topic Date Due    ADVANCE CARE PLANNING  Never done    Pneumococcal Vaccine: 50+ Years (1 of 1 - PCV) Never done    ZOSTER IMMUNIZATION (1 of 2) Never done    COVID-19 Vaccine (3 - 2024-25 season) 09/01/2024    ANNUAL REVIEW OF HM ORDERS  12/19/2024    DTAP/TDAP/TD IMMUNIZATION (2 - Td or Tdap) 02/04/2025    MEDICARE ANNUAL WELLNESS VISIT  03/12/2025    BMP  03/12/2025    LIPID  03/12/2025    INFLUENZA VACCINE (Season Ended) 09/01/2025    COLORECTAL CANCER SCREENING  10/30/2025    FALL RISK ASSESSMENT  05/08/2026    DIABETES SCREENING  03/12/2027    RSV VACCINE (1 - 1-dose 75+ series) 08/16/2034    HEPATITIS C SCREENING  Completed    HIV SCREENING  Completed    PHQ-2 (once per calendar year)  Completed    HPV IMMUNIZATION  Aged Out    MENINGITIS IMMUNIZATION  Aged Out            Objective    Exam  BP (!) 145/87   Pulse 69   Temp 97  F (36.1  C) (Tympanic)   Resp 16   Ht 1.702 m (5' 7\")   Wt 89.3 kg (196 lb 12.8 oz)   SpO2 98%   BMI 30.82 kg/m     Estimated body mass index is 30.82 kg/m  as calculated from the following:    Height as of this encounter: 1.702 m (5' 7\").    " Weight as of this encounter: 89.3 kg (196 lb 12.8 oz).    Physical Exam  GENERAL: alert and no distress  NECK: no adenopathy, no asymmetry, masses, or scars  RESP: lungs clear to auscultation - no rales, rhonchi or wheezes  CV: regular rate and rhythm, normal S1 S2, no S3 or S4, no murmur, click or rub, no peripheral edema  ABDOMEN: soft, nontender, no hepatosplenomegaly, no masses and bowel sounds normal  MS: no gross musculoskeletal defects noted, no edema         5/8/2025   Mini Cog   Clock Draw Score 2 Normal   3 Item Recall 3 objects recalled   Mini Cog Total Score 5         Vision Screen  Patient wears corrective lenses (select all that apply): Worn during vision screen, Wears regularly  Vision Screen Results: Pass      Signed Electronically by: Aydin Yu MD

## 2025-05-09 ENCOUNTER — RESULTS FOLLOW-UP (OUTPATIENT)
Dept: FAMILY MEDICINE | Facility: CLINIC | Age: 65
End: 2025-05-09

## 2025-07-24 ENCOUNTER — LAB (OUTPATIENT)
Dept: LAB | Facility: CLINIC | Age: 65
End: 2025-07-24
Payer: COMMERCIAL

## 2025-07-24 DIAGNOSIS — E78.49 FAMILIAL COMBINED HYPERLIPIDEMIA: ICD-10-CM

## 2025-07-24 DIAGNOSIS — R73.03 PREDIABETES: ICD-10-CM

## 2025-07-24 LAB
ALBUMIN SERPL BCG-MCNC: 4.5 G/DL (ref 3.5–5.2)
ALP SERPL-CCNC: 59 U/L (ref 40–150)
ALT SERPL W P-5'-P-CCNC: 62 U/L (ref 0–70)
AST SERPL W P-5'-P-CCNC: 53 U/L (ref 0–45)
BILIRUB SERPL-MCNC: 0.3 MG/DL
BILIRUBIN DIRECT (ROCHE PRO & PURE): <0.08 MG/DL (ref 0–0.45)
CHOLEST SERPL-MCNC: 165 MG/DL
EST. AVERAGE GLUCOSE BLD GHB EST-MCNC: 126 MG/DL
FASTING STATUS PATIENT QL REPORTED: YES
HBA1C MFR BLD: 6 % (ref 0–5.6)
HDLC SERPL-MCNC: 36 MG/DL
LDLC SERPL CALC-MCNC: 69 MG/DL
NONHDLC SERPL-MCNC: 129 MG/DL
PROT SERPL-MCNC: 7.1 G/DL (ref 6.4–8.3)
TRIGL SERPL-MCNC: 299 MG/DL

## 2025-07-24 PROCEDURE — 80061 LIPID PANEL: CPT | Performed by: FAMILY MEDICINE

## 2025-07-24 PROCEDURE — 84075 ASSAY ALKALINE PHOSPHATASE: CPT | Performed by: FAMILY MEDICINE
